# Patient Record
Sex: MALE | Race: WHITE | NOT HISPANIC OR LATINO | URBAN - METROPOLITAN AREA
[De-identification: names, ages, dates, MRNs, and addresses within clinical notes are randomized per-mention and may not be internally consistent; named-entity substitution may affect disease eponyms.]

---

## 2019-12-17 PROBLEM — Z00.00 ENCOUNTER FOR PREVENTIVE HEALTH EXAMINATION: Status: ACTIVE | Noted: 2019-12-17

## 2020-07-16 ENCOUNTER — EMERGENCY (EMERGENCY)
Facility: HOSPITAL | Age: 70
LOS: 1 days | Discharge: HOME | End: 2020-07-16
Payer: COMMERCIAL

## 2020-07-16 VITALS
WEIGHT: 160.06 LBS | DIASTOLIC BLOOD PRESSURE: 80 MMHG | HEART RATE: 68 BPM | OXYGEN SATURATION: 95 % | RESPIRATION RATE: 18 BRPM | TEMPERATURE: 98 F | SYSTOLIC BLOOD PRESSURE: 159 MMHG | HEIGHT: 70 IN

## 2020-07-16 LAB
BASOPHILS # BLD AUTO: 0.09 K/UL — SIGNIFICANT CHANGE UP (ref 0–0.2)
BASOPHILS NFR BLD AUTO: 1.8 % — SIGNIFICANT CHANGE UP (ref 0–2)
EOSINOPHIL # BLD AUTO: 0.22 K/UL — SIGNIFICANT CHANGE UP (ref 0–0.5)
EOSINOPHIL NFR BLD AUTO: 4.5 % — SIGNIFICANT CHANGE UP (ref 0–6)
HCT VFR BLD CALC: 45.6 % — SIGNIFICANT CHANGE UP (ref 39–50)
HGB BLD-MCNC: 15.9 G/DL — SIGNIFICANT CHANGE UP (ref 13–17)
IMM GRANULOCYTES NFR BLD AUTO: 0.2 % — SIGNIFICANT CHANGE UP (ref 0–1.5)
LYMPHOCYTES # BLD AUTO: 1.91 K/UL — SIGNIFICANT CHANGE UP (ref 1–3.3)
LYMPHOCYTES # BLD AUTO: 38.7 % — SIGNIFICANT CHANGE UP (ref 13–44)
MCHC RBC-ENTMCNC: 34.5 PG — HIGH (ref 27–34)
MCHC RBC-ENTMCNC: 34.9 GM/DL — SIGNIFICANT CHANGE UP (ref 32–36)
MCV RBC AUTO: 98.9 FL — SIGNIFICANT CHANGE UP (ref 80–100)
MONOCYTES # BLD AUTO: 0.59 K/UL — SIGNIFICANT CHANGE UP (ref 0–0.9)
MONOCYTES NFR BLD AUTO: 11.9 % — SIGNIFICANT CHANGE UP (ref 2–14)
NEUTROPHILS # BLD AUTO: 2.12 K/UL — SIGNIFICANT CHANGE UP (ref 1.8–7.4)
NEUTROPHILS NFR BLD AUTO: 42.9 % — LOW (ref 43–77)
NRBC # BLD: 0 /100 WBCS — SIGNIFICANT CHANGE UP (ref 0–0)
PCP SPEC-MCNC: SIGNIFICANT CHANGE UP
PLATELET # BLD AUTO: 147 K/UL — LOW (ref 150–400)
RBC # BLD: 4.61 M/UL — SIGNIFICANT CHANGE UP (ref 4.2–5.8)
RBC # FLD: 14 % — SIGNIFICANT CHANGE UP (ref 10.3–14.5)
WBC # BLD: 4.94 K/UL — SIGNIFICANT CHANGE UP (ref 3.8–10.5)
WBC # FLD AUTO: 4.94 K/UL — SIGNIFICANT CHANGE UP (ref 3.8–10.5)

## 2020-07-16 PROCEDURE — 93010 ELECTROCARDIOGRAM REPORT: CPT | Mod: NC

## 2020-07-16 PROCEDURE — 90792 PSYCH DIAG EVAL W/MED SRVCS: CPT | Mod: GC

## 2020-07-16 PROCEDURE — 99285 EMERGENCY DEPT VISIT HI MDM: CPT

## 2020-07-16 PROCEDURE — 70450 CT HEAD/BRAIN W/O DYE: CPT | Mod: 26

## 2020-07-16 RX ORDER — SODIUM CHLORIDE 9 MG/ML
1000 INJECTION INTRAMUSCULAR; INTRAVENOUS; SUBCUTANEOUS ONCE
Refills: 0 | Status: COMPLETED | OUTPATIENT
Start: 2020-07-16 | End: 2020-07-16

## 2020-07-16 RX ADMIN — SODIUM CHLORIDE 1000 MILLILITER(S): 9 INJECTION INTRAMUSCULAR; INTRAVENOUS; SUBCUTANEOUS at 23:28

## 2020-07-16 NOTE — ED ADULT NURSE NOTE - NSIMPLEMENTINTERV_GEN_ALL_ED
Implemented All Fall Risk Interventions:  [place] to call system. Call bell, personal items and telephone within reach. Instruct patient to call for assistance. Room bathroom lighting operational. Non-slip footwear when patient is off stretcher. Physically safe environment: no spills, clutter or unnecessary equipment. Stretcher in lowest position, wheels locked, appropriate side rails in place. Provide visual cue, wrist band, yellow gown, etc. Monitor gait and stability. Monitor for mental status changes and reorient to person, place, and time. Review medications for side effects contributing to fall risk. Reinforce activity limits and safety measures with patient and family.

## 2020-07-16 NOTE — ED PROVIDER NOTE - NS ED ROS FT
GENERAL: no fever, chills, fatigue, weight loss, night sweats  HEENT: no eye pain, discharge, conjunctivitis, ear pain, hearing loss, rhinorrhea, congestion, throat pain  CARDIAC: no chest pain, palpitations, lightheadedness, syncope  PULM: no dyspnea, wheezing  GI: no abdominal pain, nausea, vomiting, diarrhea, constipation, melena, hematochezia  : no urinary dysuria, frequency, incontinence, hematuria  NEURO: no headache, changes in vision, motor weakness, sensory changes  MSK: no joint pain, joint swelling, myalgias  SKIN: no rashes  HEME: no active bleeding, excessive bruising

## 2020-07-16 NOTE — ED PROVIDER NOTE - NSFOLLOWUPINSTRUCTIONS_ED_ALL_ED_FT
You were seen in the ER for alcohol intoxication and depressed mood. We obtained labwork and CT scan of your head which showed no significant abnormalities or brain injury. We observed you until you were sober. Initially you had thoughts of not wanting to live anymore, but once you were sober you no longer felt this way. We had our psychiatry team evaluate you who recommended following up with AA/rehab and outpatient psychiatry for further evaluation and management of your depressed mood (referral provided). You will be discharged home but should return to the ER immediately for any severe or worsening depressed mood, desire to not live anymore or plan to hurt yourself or others, shaking, hallucinations, seizures, agitation, chest pain, difficulty breathing, vomiting/inability to eat, or any other new or concerning symptoms.

## 2020-07-16 NOTE — ED PROVIDER NOTE - CLINICAL SUMMARY MEDICAL DECISION MAKING FREE TEXT BOX
70M no PMHx BIBEMS from home presenting with presumed alcohol intoxication. On PE, no signs of trauma, VS wnl, appears intoxicated. Will check FS, hold off on labs, allow metabolism of offending agent, call wife for collateral. 70M no PMHx BIBEMS from home presenting with presumed alcohol intoxication. On PE, no signs of trauma, VS wnl, appears intoxicated. Will check labs, CTH to eval for SDH in setting of multiple falls, allow metabolism of offending agent, call wife for collateral, reassess need to psych consult when clinically sober, dispo pending work up.

## 2020-07-16 NOTE — ED ADULT NURSE NOTE - OBJECTIVE STATEMENT
70yr old male w/ no pmhx BIBEMS c/o alcohol intox. Per EMS pts was drinking a lot of  techquilla and per pts wife, pt was acting irrationally and could hardly walk so she called EMS. Pt has no complaints at this time and states he feels fine. Pt is A&Ox3, pleasant, and cooperative. FS obtained upon arrival is 95. Pt denies CP, SOB, NVD, ETOH Withdrawal, GI,  symptoms.  pt assessed by MD, bed lowered and locked, call bell within reach. will reassess 70yr old male w/ no pmhx BIBEMS c/o alcohol intox. Per EMS pts was drinking a lot of  techquilla and per pts wife, pt was acting irrationally and could hardly walk so she called EMS. Pt has no complaints at this time and states he feels fine. Pt is A&Ox3, pleasant, and cooperative. FS obtained upon arrival is 95. MD s/w pts wife who states pt has been excessively drinking everyday for the past month. Wife states pts son is currently really sick in [facility] and she associates sons illness to pts drinking. Wife also states pt has had a lot of falls d/t his drinking and states pt has expressed thoughts of ending his life. Pt denies SI/HI, CP, SOB, NVD, ETOH Withdrawal, GI,  symptoms. no fall, head injury or trauma.   pt assessed by MD, bed lowered and locked, call bell within reach. will reassess

## 2020-07-16 NOTE — ED PROVIDER NOTE - PHYSICAL EXAMINATION
GENERAL: non-toxic appearing, in NAD  HEAD: atraumatic, normocephalic  EYES: vision grossly intact, no conjunctivitis or discharge  EARS: hearing grossly intact  NOSE: no nasal discharge, epistaxis   CARDIAC: RRR, normal S1S2,  no appreciable murmurs, no cyanosis, cap refill < 2 seconds  PULM: no respiratory distress, oxygen saturation on RA wnl, CTAB, no crackles, rales, rhonchi, or wheezing  GI: abdomen nondistended, soft, nontender, no guarding or rebound tenderness, no palpable masses  NEURO: awake and alert, follow commands, slurred speech, PERRLA, EOMI, no focal motor or sensory deficits  MSK: spine appears normal, no joint swelling or erythema, no gross deformities of extremities  EXT: no peripheral edema, calf tenderness, redness or swelling  SKIN: warm, dry, and intact, no rashes  PSYCH: appropriate mood and affect

## 2020-07-16 NOTE — ED ADULT NURSE NOTE - CAS EDN DISCHARGE ASSESSMENT
Awake/Patient baseline mental status/Alert and oriented to person, place and time/Dressing clean and dry/Symptoms improved

## 2020-07-16 NOTE — ED PROVIDER NOTE - PATIENT PORTAL LINK FT
You can access the FollowMyHealth Patient Portal offered by [facility] by registering at the following website: [email or web address]. By joining [facility]’s FollowMyHealth portal, you will also be able to view your health information using other applications (apps) compatible with our system.

## 2020-07-16 NOTE — ED PROVIDER NOTE - NSFOLLOWUPCLINICS_GEN_ALL_ED_FT
[facility] Psych Dept of Substance Abuse  Psychiatry Substance Abuse  [address] [place] [address]  Phone: [phone]  Fax:   Follow Up Time: 1-3 Days    [facility] Psychiatry  Psychiatry  [address] [place] [address]  Phone: [phone]  Fax:   Follow Up Time:

## 2020-07-16 NOTE — ED PROVIDER NOTE - PROGRESS NOTE DETAILS
Wife:  Drinking every single day.  Son hospitalized at [facility] with cancer.  He has never made her feel unsafe, "She just needed a break." Sign out follow-up: No acute overnight events. Pt calm. Ambulatory. Awaiting psychiatric evaluation when sober (8AM by estimated metabolism) for suicidal ideation based on statements made to spouse. MARIA G. Abelardo EM/IM PGY3: Pt evaluated and cleared by psych, recommending outpatient substance abuse and psych f/u. Patient clinically sober, ambulating without difficulty, tolerating PO intake, speaking calm and cooperatively, no longer with SI, states he feels well and would like to be discharged home. Spoke with patient's wife and updated on these results and plan which she verbalized understanding of and agrees with, feels safe with him coming home.

## 2020-07-16 NOTE — ED PROVIDER NOTE - ATTENDING CONTRIBUTION TO CARE
MD Mccracken:  patient seen and evaluated with the resident.  I was present for key portions of the History & Physical, and I agree with the Impression & Plan.  MD Mccracken:  71 yo M, bib EMS after wife called EMS on account of verbal altercation.   Onset: 1hr PTA.  Location of event: at private residence, in [place].  Patient endorses +++ ETOH tonight, tequila.  Cannot say exactly how much he drank or when was his last drink.  VS: wnl.  Physical Exam:  elderly M, smells of ETOH, pleasant, smiling, NCAT, PERRL, EOMI, neck supple, RRR, CTA B, Abd: s/nd/nt, Ext: no edema, Neuro: AAOx3, gait not assessed.  Impression:  ETOH intox w/o any evidence of injury or clinical finding more concerning for other underlying pathology.  I do not feel that ETOH level, labs, or advanced imaging are indicated, as they will not impact patient care at this time.    Plan:  call wife, verify collateral, metabolize, clinically clear.

## 2020-07-16 NOTE — ED PROVIDER NOTE - OBJECTIVE STATEMENT
70M no PMHx BIBEMS from home presenting with presumed alcohol intoxication. Patient says he had a few drinks of Tequila tonight. Denies fall, head trauma, LOC. No chest pain, abdominal pain, fever, URI symptoms, n/v/d. Just states he "feels shitty". Called wife for collateral. She says he drinks daily. Son is in [facility] 70M no PMHx BIBEMS from home presenting with presumed alcohol intoxication. Patient says he had a few drinks of Tequila tonight. Denies fall, head trauma, LOC. No chest pain, abdominal pain, fever, URI symptoms, n/v/d. Just states he "feels shitty". Called wife for collateral. She says he drinks daily over the past month, which is new behavior for him. Son is in [facility] and is very sick and she believes this is the reason he is drinking. She says that "he is a mess" and believes he may be depressed. She states that patient has said "I don't want to live anymore". Denies any actual suicidal attempts. She says that he does not threaten her or hurt her, but does verbally abuse her. States that he falls almost every day, including today. Patient denies SI/HI, hallucinations at this time.

## 2020-07-17 VITALS
SYSTOLIC BLOOD PRESSURE: 157 MMHG | DIASTOLIC BLOOD PRESSURE: 80 MMHG | RESPIRATION RATE: 16 BRPM | HEART RATE: 72 BPM | OXYGEN SATURATION: 96 % | TEMPERATURE: 98 F

## 2020-07-17 DIAGNOSIS — F43.21 ADJUSTMENT DISORDER WITH DEPRESSED MOOD: ICD-10-CM

## 2020-07-17 LAB
ALBUMIN SERPL ELPH-MCNC: 4.3 G/DL — SIGNIFICANT CHANGE UP (ref 3.3–5)
ALP SERPL-CCNC: 60 U/L — SIGNIFICANT CHANGE UP (ref 40–120)
ALT FLD-CCNC: 18 U/L — SIGNIFICANT CHANGE UP (ref 10–45)
ANION GAP SERPL CALC-SCNC: 15 MMOL/L — SIGNIFICANT CHANGE UP (ref 5–17)
APAP SERPL-MCNC: <15 UG/ML — SIGNIFICANT CHANGE UP (ref 10–30)
APPEARANCE UR: CLEAR — SIGNIFICANT CHANGE UP
APTT BLD: 28.6 SEC — SIGNIFICANT CHANGE UP (ref 27.5–35.5)
AST SERPL-CCNC: 17 U/L — SIGNIFICANT CHANGE UP (ref 10–40)
BACTERIA # UR AUTO: 0 — SIGNIFICANT CHANGE UP
BILIRUB SERPL-MCNC: 0.4 MG/DL — SIGNIFICANT CHANGE UP (ref 0.2–1.2)
BILIRUB UR-MCNC: NEGATIVE — SIGNIFICANT CHANGE UP
BUN SERPL-MCNC: 17 MG/DL — SIGNIFICANT CHANGE UP (ref 7–23)
CALCIUM SERPL-MCNC: 8.9 MG/DL — SIGNIFICANT CHANGE UP (ref 8.4–10.5)
CHLORIDE SERPL-SCNC: 108 MMOL/L — SIGNIFICANT CHANGE UP (ref 96–108)
CO2 SERPL-SCNC: 21 MMOL/L — LOW (ref 22–31)
COLOR SPEC: COLORLESS — SIGNIFICANT CHANGE UP
CREAT SERPL-MCNC: 0.71 MG/DL — SIGNIFICANT CHANGE UP (ref 0.5–1.3)
DIFF PNL FLD: NEGATIVE — SIGNIFICANT CHANGE UP
EPI CELLS # UR: 0 /HPF — SIGNIFICANT CHANGE UP
ETHANOL SERPL-MCNC: 337 MG/DL — HIGH (ref 0–10)
GLUCOSE SERPL-MCNC: 103 MG/DL — HIGH (ref 70–99)
GLUCOSE UR QL: NEGATIVE — SIGNIFICANT CHANGE UP
INR BLD: 0.87 RATIO — LOW (ref 0.88–1.16)
KETONES UR-MCNC: NEGATIVE — SIGNIFICANT CHANGE UP
LEUKOCYTE ESTERASE UR-ACNC: NEGATIVE — SIGNIFICANT CHANGE UP
LIDOCAIN IGE QN: 43 U/L — SIGNIFICANT CHANGE UP (ref 7–60)
MAGNESIUM SERPL-MCNC: 2.2 MG/DL — SIGNIFICANT CHANGE UP (ref 1.6–2.6)
NITRITE UR-MCNC: NEGATIVE — SIGNIFICANT CHANGE UP
PH UR: 6 — SIGNIFICANT CHANGE UP (ref 5–8)
PHOSPHATE SERPL-MCNC: 3.2 MG/DL — SIGNIFICANT CHANGE UP (ref 2.5–4.5)
POTASSIUM SERPL-MCNC: 4.2 MMOL/L — SIGNIFICANT CHANGE UP (ref 3.5–5.3)
POTASSIUM SERPL-SCNC: 4.2 MMOL/L — SIGNIFICANT CHANGE UP (ref 3.5–5.3)
PROT SERPL-MCNC: 7.1 G/DL — SIGNIFICANT CHANGE UP (ref 6–8.3)
PROT UR-MCNC: NEGATIVE — SIGNIFICANT CHANGE UP
PROTHROM AB SERPL-ACNC: 10.4 SEC — LOW (ref 10.6–13.6)
RBC CASTS # UR COMP ASSIST: 0 /HPF — SIGNIFICANT CHANGE UP (ref 0–4)
SALICYLATES SERPL-MCNC: <2 MG/DL — LOW (ref 15–30)
SODIUM SERPL-SCNC: 144 MMOL/L — SIGNIFICANT CHANGE UP (ref 135–145)
SP GR SPEC: 1.01 — LOW (ref 1.01–1.02)
UROBILINOGEN FLD QL: NEGATIVE — SIGNIFICANT CHANGE UP
WBC UR QL: 0 /HPF — SIGNIFICANT CHANGE UP (ref 0–5)

## 2020-07-17 PROCEDURE — 93005 ELECTROCARDIOGRAM TRACING: CPT

## 2020-07-17 PROCEDURE — 85610 PROTHROMBIN TIME: CPT

## 2020-07-17 PROCEDURE — 71045 X-RAY EXAM CHEST 1 VIEW: CPT

## 2020-07-17 PROCEDURE — 70450 CT HEAD/BRAIN W/O DYE: CPT

## 2020-07-17 PROCEDURE — 80307 DRUG TEST PRSMV CHEM ANLYZR: CPT

## 2020-07-17 PROCEDURE — 71045 X-RAY EXAM CHEST 1 VIEW: CPT | Mod: 26

## 2020-07-17 PROCEDURE — 80053 COMPREHEN METABOLIC PANEL: CPT

## 2020-07-17 PROCEDURE — 82962 GLUCOSE BLOOD TEST: CPT

## 2020-07-17 PROCEDURE — 84100 ASSAY OF PHOSPHORUS: CPT

## 2020-07-17 PROCEDURE — 85027 COMPLETE CBC AUTOMATED: CPT

## 2020-07-17 PROCEDURE — 83690 ASSAY OF LIPASE: CPT

## 2020-07-17 PROCEDURE — 85730 THROMBOPLASTIN TIME PARTIAL: CPT

## 2020-07-17 PROCEDURE — 99285 EMERGENCY DEPT VISIT HI MDM: CPT | Mod: 25

## 2020-07-17 PROCEDURE — 83735 ASSAY OF MAGNESIUM: CPT

## 2020-07-17 PROCEDURE — 81001 URINALYSIS AUTO W/SCOPE: CPT

## 2020-07-17 RX ORDER — THIAMINE MONONITRATE (VIT B1) 100 MG
100 TABLET ORAL ONCE
Refills: 0 | Status: COMPLETED | OUTPATIENT
Start: 2020-07-17 | End: 2020-07-17

## 2020-07-17 RX ORDER — FOLIC ACID 0.8 MG
1 TABLET ORAL ONCE
Refills: 0 | Status: COMPLETED | OUTPATIENT
Start: 2020-07-17 | End: 2020-07-17

## 2020-07-17 RX ADMIN — Medication 1 TABLET(S): at 03:40

## 2020-07-17 RX ADMIN — Medication 25 MILLIGRAM(S): at 03:41

## 2020-07-17 RX ADMIN — Medication 100 MILLIGRAM(S): at 03:40

## 2020-07-17 RX ADMIN — Medication 1 MILLIGRAM(S): at 03:40

## 2020-07-17 NOTE — ED ADULT NURSE REASSESSMENT NOTE - NS ED NURSE REASSESS COMMENT FT1
report received from RDOY Velasco. patient resting comfortably and wakes easily. as many hazards cleared from room as possible. patient awaiting psych consult. 1:1 maintained for patient safety. patient awaiting psych consult. will continue to monitor.

## 2020-07-17 NOTE — ED ADULT NURSE REASSESSMENT NOTE - NS ED NURSE REASSESS COMMENT FT1
pt sleeping in bed, VSS, and, pt arousable upon stimulation and in NAD. pt has no complaints at this time. will reassess. 1:1 still in place, safety maintained.

## 2020-07-17 NOTE — CHART NOTE - NSCHARTNOTEFT_GEN_A_CORE
EMERGENCY : LMSW consulted by psychiatry team stating that patient is cleared for discharge and in need of further community resources for behavorial health and ETOH use. As per MD patient is also medically cleared for discharge. LMSW reviewed patient's chart. As per chart review patient is a 70yr old male w/ no pmhx BIBEMS c/o alcohol intox. Per EMS pts was drinking a lot of tequila and per pts wife, pt was acting irrationally and could hardly walk so she called EMS. Wife also states to MD that pt has had a lot of falls d/t his drinking and states pt has expressed thoughts of ending his life. For this reason, patient was referred to psych this morning once clinically sober. Pt cleared for DC by psych and med teams. LMSW met with patient at bedside and introduced self to which patient verbalized understanding. Patient is alert and oriented x4 at this time. Patient confirms all demographic information. SBIRT completed (see SBIRT note). Patient endorses ETOH use but no drug use. LMSW provided education and counseling to patient on harmful ETOH use to which patient was receptive. Patient states he has seen a psychiatrist in the past, Dr. Lucia located at [address] [place] [address] PH:[phone], but that it has been years since he has seen him. Patient states should he feel he needs to seek help again that he would go back to Dr. Lucia. LMSW also provided education on additional resources like SBIRT community resources, [facility] crisis center and inpatient SBIRT resources. Patient receptive to crisis clinic and community outpatient resources and accepts these resources but declines inpatient resources. Patient with no further questions or concerns at this time. Patient states he will call a taxi to go home. LMSW provided patient with contact information and ensured ongoing social work availability. Social work remains available for any needs.

## 2020-07-17 NOTE — BEHAVIORAL HEALTH ASSESSMENT NOTE - SUMMARY
pt is a 69 y/o male no PMHx , denies previous psychiatric hx. domiciled with wife in private house. Pt was BIBEMS from home presenting with alcohol intoxication. consult was place for possible suicidal ideations.     pt was calm cooperative, denies current depression. pt relates stressors however relates coping stratagies

## 2020-07-17 NOTE — BEHAVIORAL HEALTH ASSESSMENT NOTE - NSBHCHARTREVIEWINVESTIGATE_PSY_A_CORE FT
Ventricular Rate 67 BPM    Atrial Rate 67 BPM    P-R Interval 170 ms    QRS Duration 104 ms     ms    QTc 395 ms    P Axis 39 degrees    R Axis 38 degrees    T Axis 24 degrees    Diagnosis Line SINUS RHYTHM WITH PREMATURE ATRIAL COMPLEXES  OTHERWISE NORMAL ECG

## 2020-07-17 NOTE — BEHAVIORAL HEALTH ASSESSMENT NOTE - SUICIDE PROTECTIVE FACTORS
Ability to cope with stress/Has future plans/Identifies reasons for living/Supportive social network of family or friends/Responsibility to family and others

## 2020-07-17 NOTE — BEHAVIORAL HEALTH ASSESSMENT NOTE - NSBHCHARTREVIEWLAB_PSY_A_CORE FT
CBC Full  -  ( 2020 23:40 )  WBC Count : 4.94 K/uL  RBC Count : 4.61 M/uL  Hemoglobin : 15.9 g/dL  Hematocrit : 45.6 %  Platelet Count - Automated : 147 K/uL  Mean Cell Volume : 98.9 fl  Mean Cell Hemoglobin : 34.5 pg  Mean Cell Hemoglobin Concentration : 34.9 gm/dL  Auto Neutrophil # : 2.12 K/uL  Auto Lymphocyte # : 1.91 K/uL  Auto Monocyte # : 0.59 K/uL  Auto Eosinophil # : 0.22 K/uL  Auto Basophil # : 0.09 K/uL  Auto Neutrophil % : 42.9 %  Auto Lymphocyte % : 38.7 %  Auto Monocyte % : 11.9 %  Auto Eosinophil % : 4.5 %  Auto Basophil % : 1.8 %    -    144  |  108  |  17  ----------------------------<  103<H>  4.2   |  21<L>  |  0.71    Ca    8.9      2020 23:40  Phos  3.2     07-16  Mg     2.2     07-16    TPro  7.1  /  Alb  4.3  /  TBili  0.4  /  DBili  x   /  AST  17  /  ALT  18  /  AlkPhos  60  07-16    LIVER FUNCTIONS - ( 2020 23:40 )  Alb: 4.3 g/dL / Pro: 7.1 g/dL / ALK PHOS: 60 U/L / ALT: 18 U/L / AST: 17 U/L / GGT: x           Urinalysis Basic - ( 2020 23:47 )    Color: Colorless / Appearance: Clear / S.008 / pH: x  Gluc: x / Ketone: Negative  / Bili: Negative / Urobili: Negative   Blood: x / Protein: Negative / Nitrite: Negative   Leuk Esterase: Negative / RBC: 0 /hpf / WBC 0 /HPF   Sq Epi: x / Non Sq Epi: 0 /hpf / Bacteria: 0.0

## 2020-07-17 NOTE — BEHAVIORAL HEALTH ASSESSMENT NOTE - NSBHCONSULTRECOMMENDOTHER_PSY_A_CORE FT
referral to rehab, AA and or outpatient substance use treatement for alcohol use  pt to follow up with outpatient psychiatrist/therapist  provide [facility] resource

## 2020-07-17 NOTE — BEHAVIORAL HEALTH ASSESSMENT NOTE - CASE SUMMARY
This is a 70-y.o. CM patient no PMHx , denies previous psychiatric hx. domiciled with wife in private house. Pt was BIBEMS from home presenting with alcohol intoxication. consult was place for possible suicidal ideations.    I have seen and evaluated this patient myself. Chart, labs, meds reviewed. I agree with fellow's assessment and plan.

## 2020-07-17 NOTE — BEHAVIORAL HEALTH ASSESSMENT NOTE - NSBHCHARTREVIEWVS_PSY_A_CORE FT
Vital Signs Last 24 Hrs  T(C): 36.9 (17 Jul 2020 10:54), Max: 36.9 (16 Jul 2020 22:53)  T(F): 98.4 (17 Jul 2020 10:54), Max: 98.4 (16 Jul 2020 22:53)  HR: 72 (17 Jul 2020 10:54) (63 - 75)  BP: 157/80 (17 Jul 2020 10:54) (131/62 - 159/80)  BP(mean): --  RR: 16 (17 Jul 2020 10:54) (16 - 20)  SpO2: 96% (17 Jul 2020 10:54) (95% - 98%)

## 2020-07-17 NOTE — ED ADULT NURSE REASSESSMENT NOTE - NS ED NURSE REASSESS COMMENT FT1
pt A&Ox3 resting comfortably in bed, arousable upon stimuli. pt non combative, and pleasant with no complaints at this time.

## 2020-07-17 NOTE — SBIRT NOTE ADULT - NSSBIRTALCPASSREFTXDET_GEN_A_CORE
LMSW provided education and counseling on harmful ETOH use. LMSW also provided patient with multiple SBIRT community resources as well as the [facility] crisis center pamphlet and provided education on resources. Patient receptive to resources but declines assistance by LMSW to schedule intake appointment. LMSW also offered patient inpatient resources to which he declined. Contact information provided. Ongoing social work availability ensured.

## 2020-07-17 NOTE — BEHAVIORAL HEALTH ASSESSMENT NOTE - HPI (INCLUDE ILLNESS QUALITY, SEVERITY, DURATION, TIMING, CONTEXT, MODIFYING FACTORS, ASSOCIATED SIGNS AND SYMPTOMS)
pt is a 71 y/o male no PMHx , denies previous psychiatric hx. domiciled with wife in private house. Pt was BIBEMS from home presenting with alcohol intoxication. consult was place for possible suicidal ideations.     Pt was seen and evaluated with Dr. Flores, 1:1 bedside    Pt was calm, cooperative, pleasant, making jokes with writer appropriately, expresses full range of emotions. pt has linear thought process. He relates fair mood today. He relates had recent stressors in his life and has been drinking daily for the last few months. He relates his son has back cancer is having surgery today. He relates remaining positive about his prognosis and relates if there is a complication he would handle it fairly "get things done". pt however remains optimistic about his outcome. He relates "small arguments" which his wife in addition. He relates being bored at home since he has not work however pt relates he is financially stable and does not need to work. He relates drinking 2-3 drinks. Pt relates "I drink because I'm bored" He denied drinking to cope with stress.  He relates increased appetite due to coping with stress. He relates getting poor sleep at time with difficulty with sleep maintenance . He relates he will get 8 hours of sleep total. Fair energy. He denies hopelessness, helplessness or worthlessness. Pt denies remembering making suicidal statement while intoxicated Patient denies current suicidal or homicidal ideations intent or plan . "I want to life, Its time for me to enjoy my life,..." pt denies current or past symptoms of A/V hallucinations, pt denies paranoia. Pt is goal oriented,. He discussed various hobbies such as photography, traveling, he relates working systematically through stressors. pt believes he jcarlos with stress fairly. Pt relates he would decrease his alcohol intake. Writer use Motivational interviewing on a scale of 1-10 , (10 meaning absence and 0)pt relates he is at a 9 in regards to quitting. "I can quit when I want too" He relates motivation to follow up with his psychiatrist. denies current treatment. pt reports he has Xanax 1mg PRN for anxiety , he adamantly and without prompting relates he does not mix this medication with alcohol, relates understanding risk of alcohol and benzo use and again denies concurrent use.    Collateral attempted from pt's wife ()- generic message pt is a 69 y/o male no PMHx , denies previous psychiatric hx. domiciled with wife in private house. Pt was BIBEMS from home presenting with alcohol intoxication. consult was place for possible suicidal ideations.     Pt was seen and evaluated with Dr. Flores, 1:1 bedside    Pt was calm, cooperative, pleasant, making jokes with writer appropriately, expresses full range of emotions. pt has linear thought process. He relates fair mood today. He relates had recent stressors in his life and has been drinking daily for the last few months. He relates his son has back cancer is having surgery today. He relates remaining positive about his prognosis and relates if there is a complication he would handle it fairly "get things done". pt however remains optimistic about his outcome. He relates "small arguments" which his wife in addition. He relates being bored at home since he has not work however pt relates he is financially stable and does not need to work. He relates drinking 2-3 drinks. Pt relates "I drink because I'm bored" He denied drinking to cope with stress.  He relates increased appetite due to coping with stress. He relates getting poor sleep at time with difficulty with sleep maintenance . He relates he will get 8 hours of sleep total. Fair energy. He denies hopelessness, helplessness or worthlessness. Pt denies remembering making suicidal statement while intoxicated Patient denies current suicidal or homicidal ideations intent or plan . "I want to life, Its time for me to enjoy my life,..." pt denies current or past symptoms of A/V hallucinations, pt denies paranoia. Pt is goal oriented,. He discussed various hobbies such as photography, traveling, he relates working systematically through stressors. pt believes he jcarlos with stress fairly. Pt relates he would decrease his alcohol intake. Writer use Motivational interviewing on a scale of 1-10 , (10 meaning absence and 0)pt relates he is at a 9 in regards to quitting. "I can quit when I want too" He relates motivation to follow up with his psychiatrist. denies current treatment. pt reports he has Xanax 1mg PRN for anxiety , he adamantly and without prompting relates he does not mix this medication with alcohol, relates understanding risk of alcohol and benzo use and again denies concurrent use.    Collateral attempted from pt's wife ()- generic message left initially, She called back service.  She relates pt has been "a little depressed" recently. She corroborates with above stressor. He has been drinking a lot recently for last few months. pt would fall after drinking some times and feels he should cut down. She denies signs of psychosis and red flag signs regarding depression such any suicide note and denies pt giving away belongings. He made a passive si statement yesterday while intoxicated but has never acted on this. Pt does not voice suicidal ideation when sober. He had some difficulty adjusting to Covid restrictions and his son occasionally cries about this.  She relates He dreamt of moving out of [place] which is on hold due to covid. She relates feeling comfortable with patient returning home.

## 2020-07-19 LAB
AMPHET UR-MCNC: NEGATIVE — SIGNIFICANT CHANGE UP
BARBITURATES, URINE.: NEGATIVE — SIGNIFICANT CHANGE UP
BENZODIAZ UR-MCNC: NEGATIVE — SIGNIFICANT CHANGE UP
COCAINE METAB.OTHER UR-MCNC: NEGATIVE — SIGNIFICANT CHANGE UP
CREATININE, URINE THERAPEUTIC: 26.3 MG/DL — SIGNIFICANT CHANGE UP
METHADONE UR-MCNC: NEGATIVE — SIGNIFICANT CHANGE UP
METHAQUALONE UR QL: NEGATIVE — SIGNIFICANT CHANGE UP
METHAQUALONE UR-MCNC: NEGATIVE — SIGNIFICANT CHANGE UP
OPIATES UR-MCNC: NEGATIVE — SIGNIFICANT CHANGE UP
PCP UR-MCNC: NEGATIVE — SIGNIFICANT CHANGE UP
PROPOXYPH UR QL: NEGATIVE — SIGNIFICANT CHANGE UP
THC UR QL: NEGATIVE — SIGNIFICANT CHANGE UP

## 2020-07-30 ENCOUNTER — EMERGENCY (EMERGENCY)
Facility: HOSPITAL | Age: 70
LOS: 1 days | Discharge: HOME | End: 2020-07-30
Attending: EMERGENCY MEDICINE
Payer: COMMERCIAL

## 2020-07-30 VITALS — SYSTOLIC BLOOD PRESSURE: 186 MMHG | DIASTOLIC BLOOD PRESSURE: 89 MMHG

## 2020-07-30 PROCEDURE — 93010 ELECTROCARDIOGRAM REPORT: CPT | Mod: NC

## 2020-07-30 PROCEDURE — 99284 EMERGENCY DEPT VISIT MOD MDM: CPT

## 2020-07-31 VITALS
HEART RATE: 65 BPM | RESPIRATION RATE: 18 BRPM | SYSTOLIC BLOOD PRESSURE: 118 MMHG | OXYGEN SATURATION: 98 % | TEMPERATURE: 99 F | DIASTOLIC BLOOD PRESSURE: 60 MMHG

## 2020-07-31 PROCEDURE — 93005 ELECTROCARDIOGRAM TRACING: CPT

## 2020-07-31 PROCEDURE — 82962 GLUCOSE BLOOD TEST: CPT

## 2020-07-31 PROCEDURE — 99285 EMERGENCY DEPT VISIT HI MDM: CPT

## 2020-07-31 RX ADMIN — Medication 1 TABLET(S): at 01:23

## 2020-07-31 NOTE — ED PROVIDER NOTE - OBJECTIVE STATEMENT
69 yo M with no pmh presents with acute alcohol intoxication. Reports drinking vodka in the last few days as a result of his 41y son diagnosed with cancer recently. Denies SI or HI. Denies prior admissions for alcohol intoxication or chronic alcohol use. Unwilling to tell time of last drink. No recent falls or head trauma. No other substance ingested.

## 2020-07-31 NOTE — ED PROVIDER NOTE - NSFOLLOWUPINSTRUCTIONS_ED_ALL_ED_FT
You were seen in the ED for altered mental state.   The following labs/imaging were obtained: see attached (if applicable)  Continue home meds if applicable.   Return to the ED if you develop leg or arm weakness, slurred speech, chest pain, shortness of breath, or worsening or new concerning symptoms.  Follow up with your primary care in 2-3 days.  Discussed with pt results of work up, strict return precautions, and need for follow up.  Pt expressed understanding and agrees with plan.

## 2020-07-31 NOTE — ED PROVIDER NOTE - PHYSICAL EXAMINATION
GENERAL: No fever or chills  EYES: no change in vision  HEENT: no trouble swallowing or speaking  CARDIAC: no chest pain or palpiations   PULMONARY: no cough or SOB  GI: no abdominal pain, nausea, vomiting, diarrhea, or constipation   : No changes in urination  SKIN: no rashes  NEURO: no headache, numbness, or weakness. Unwilling to walk.   MSK: No joint pain

## 2020-07-31 NOTE — ED PROVIDER NOTE - ATTENDING CONTRIBUTION TO CARE
------------ATTENDING NOTE------------   pt brought to ED by EMS after wife called that pt was acting funny, pt describes drinking alcohol secretly all day and describes being drunk, he denies any additional drug/intoxicant use, no trauma, no thoughts to harm/kill self/others, he is sad about his son's cancer diagnosis but feels safe at home, he has seen SW in past but declining services now, otherwise having coherent conversation and agreeing to EKG / close reassessments w/ plan to have d/w wife to d/c home -->  - Brad La MD   ------------------------------------------------ ------------ATTENDING NOTE------------   pt brought to ED by EMS after wife called that pt was acting funny, pt describes drinking alcohol secretly all day and describes being drunk, he denies any additional drug/intoxicant use, no trauma, no thoughts to harm/kill self/others, he is sad about his son's cancer diagnosis but feels safe at home, he has seen SW in past but declining services now, otherwise having coherent conversation and agreeing to EKG / close reassessments w/ plan to have d/w wife to d/c home --> ED Sign Out 1AM, remaining stable, nml VS, pending close reassessments for safety and d/w wife for planned d/c home.  - Brad La MD   ------------------------------------------------

## 2020-07-31 NOTE — ED ADULT NURSE NOTE - OBJECTIVE STATEMENT
Pt 69 y/o male, AxOx3, presents to ED from home after wife called EMS due to pt "not acting right". Pt reports drinking alcohol today, unknown how much at this time. Pt placed on constant observation upon arrival due to altered mental status. Pt is well appearing, speaking full sentences without difficulty. Breathing spontaneous and unlabored with pulse ox >95% on room air. Safety and comfort measures initiated- bed placed in lowest position and side rails raised. Pt oriented to call bell system.

## 2020-07-31 NOTE — ED ADULT NURSE REASSESSMENT NOTE - NS ED NURSE REASSESS COMMENT FT1
Pt awoke for vital signs. Pt calm, well appearing reporting that he "wants to sleep". Maintained on 1:1 for safety. V/S stable. MD Moran made aware.

## 2020-07-31 NOTE — ED PROVIDER NOTE - CLINICAL SUMMARY MEDICAL DECISION MAKING FREE TEXT BOX
see MD Note see MD Note  PGY 1 69 yo M with no pmh presents with acute alcohol intoxication in the setting of his son being diagnosed with cancer recently. Vitals WNL. No concern for acute withdrawal or suicidal ideation. Will check glucose, ekg, and re-evaluate until clinically sober.

## 2020-07-31 NOTE — ED PROVIDER NOTE - PATIENT PORTAL LINK FT
You can access the FollowMyHealth Patient Portal offered by [facility] by registering at the following website: [email or web address]. By joining [facility]’s FollowMyHealth portal, you will also be able to view your health information using other applications (apps) compatible with our system.

## 2021-02-16 NOTE — BEHAVIORAL HEALTH ASSESSMENT NOTE - HOMICIDALITY / AGGRESSION (CURRENT/PAST)
Per Dr. Thomas, spoke with patient regarding 2/15/21 appointment. Patient thought this appointment was cancelled as she decided to use topical acne treatments.     Patient also states she thought her medications were going to be refilled to the [facility] in [place].     Routed to RN   None known in lifetime

## 2021-08-23 ENCOUNTER — INPATIENT (INPATIENT)
Facility: HOSPITAL | Age: 71
LOS: 8 days | Discharge: NURSING, REHAB OR LONG-TERM CARE | DRG: 270 | End: 2021-09-01
Attending: STUDENT IN AN ORGANIZED HEALTH CARE EDUCATION/TRAINING PROGRAM | Admitting: HOSPITALIST
Payer: COMMERCIAL

## 2021-08-23 VITALS
TEMPERATURE: 98 F | OXYGEN SATURATION: 98 % | HEART RATE: 95 BPM | WEIGHT: 169.98 LBS | HEIGHT: 70 IN | SYSTOLIC BLOOD PRESSURE: 139 MMHG | DIASTOLIC BLOOD PRESSURE: 88 MMHG | RESPIRATION RATE: 16 BRPM

## 2021-08-23 DIAGNOSIS — I82.409 ACUTE EMBOLISM AND THROMBOSIS OF UNSPECIFIED DEEP VEINS OF UNSPECIFIED LOWER EXTREMITY: ICD-10-CM

## 2021-08-23 LAB
ALBUMIN SERPL ELPH-MCNC: 4 G/DL — SIGNIFICANT CHANGE UP (ref 3.3–5)
ALP SERPL-CCNC: 65 U/L — SIGNIFICANT CHANGE UP (ref 40–120)
ALT FLD-CCNC: 19 U/L — SIGNIFICANT CHANGE UP (ref 10–45)
ANION GAP SERPL CALC-SCNC: 19 MMOL/L — HIGH (ref 5–17)
APTT BLD: 29.1 SEC — SIGNIFICANT CHANGE UP (ref 27.5–35.5)
AST SERPL-CCNC: 15 U/L — SIGNIFICANT CHANGE UP (ref 10–40)
BASOPHILS # BLD AUTO: 0.1 K/UL — SIGNIFICANT CHANGE UP (ref 0–0.2)
BASOPHILS NFR BLD AUTO: 0.9 % — SIGNIFICANT CHANGE UP (ref 0–2)
BILIRUB SERPL-MCNC: 1.5 MG/DL — HIGH (ref 0.2–1.2)
BUN SERPL-MCNC: 19 MG/DL — SIGNIFICANT CHANGE UP (ref 7–23)
CALCIUM SERPL-MCNC: 10.2 MG/DL — SIGNIFICANT CHANGE UP (ref 8.4–10.5)
CHLORIDE SERPL-SCNC: 93 MMOL/L — LOW (ref 96–108)
CO2 SERPL-SCNC: 19 MMOL/L — LOW (ref 22–31)
CREAT SERPL-MCNC: 1.04 MG/DL — SIGNIFICANT CHANGE UP (ref 0.5–1.3)
EOSINOPHIL # BLD AUTO: 0.09 K/UL — SIGNIFICANT CHANGE UP (ref 0–0.5)
EOSINOPHIL NFR BLD AUTO: 0.8 % — SIGNIFICANT CHANGE UP (ref 0–6)
ETHANOL SERPL-MCNC: SIGNIFICANT CHANGE UP MG/DL (ref 0–10)
GLUCOSE SERPL-MCNC: 139 MG/DL — HIGH (ref 70–99)
HCT VFR BLD CALC: 48.8 % — SIGNIFICANT CHANGE UP (ref 39–50)
HGB BLD-MCNC: 17 G/DL — SIGNIFICANT CHANGE UP (ref 13–17)
INR BLD: 1.28 RATIO — HIGH (ref 0.88–1.16)
LYMPHOCYTES # BLD AUTO: 0.47 K/UL — LOW (ref 1–3.3)
LYMPHOCYTES # BLD AUTO: 4.3 % — LOW (ref 13–44)
MANUAL SMEAR VERIFICATION: SIGNIFICANT CHANGE UP
MCHC RBC-ENTMCNC: 34.8 GM/DL — SIGNIFICANT CHANGE UP (ref 32–36)
MCHC RBC-ENTMCNC: 35 PG — HIGH (ref 27–34)
MCV RBC AUTO: 100.4 FL — HIGH (ref 80–100)
MONOCYTES # BLD AUTO: 1.21 K/UL — HIGH (ref 0–0.9)
MONOCYTES NFR BLD AUTO: 11.1 % — SIGNIFICANT CHANGE UP (ref 2–14)
NEUTROPHILS # BLD AUTO: 9.02 K/UL — HIGH (ref 1.8–7.4)
NEUTROPHILS NFR BLD AUTO: 82.9 % — HIGH (ref 43–77)
PLAT MORPH BLD: NORMAL — SIGNIFICANT CHANGE UP
PLATELET # BLD AUTO: 97 K/UL — LOW (ref 150–400)
POTASSIUM SERPL-MCNC: 5.1 MMOL/L — SIGNIFICANT CHANGE UP (ref 3.5–5.3)
POTASSIUM SERPL-SCNC: 5.1 MMOL/L — SIGNIFICANT CHANGE UP (ref 3.5–5.3)
PROT SERPL-MCNC: 7.4 G/DL — SIGNIFICANT CHANGE UP (ref 6–8.3)
PROTHROM AB SERPL-ACNC: 15.2 SEC — HIGH (ref 10.6–13.6)
RBC # BLD: 4.86 M/UL — SIGNIFICANT CHANGE UP (ref 4.2–5.8)
RBC # FLD: 12.3 % — SIGNIFICANT CHANGE UP (ref 10.3–14.5)
RBC BLD AUTO: NORMAL — SIGNIFICANT CHANGE UP
SARS-COV-2 RNA SPEC QL NAA+PROBE: SIGNIFICANT CHANGE UP
SODIUM SERPL-SCNC: 131 MMOL/L — LOW (ref 135–145)
WBC # BLD: 10.88 K/UL — HIGH (ref 3.8–10.5)
WBC # FLD AUTO: 10.88 K/UL — HIGH (ref 3.8–10.5)

## 2021-08-23 PROCEDURE — 70450 CT HEAD/BRAIN W/O DYE: CPT | Mod: 26,MA

## 2021-08-23 PROCEDURE — 71045 X-RAY EXAM CHEST 1 VIEW: CPT | Mod: 26

## 2021-08-23 PROCEDURE — 93010 ELECTROCARDIOGRAM REPORT: CPT | Mod: NC

## 2021-08-23 PROCEDURE — 93971 EXTREMITY STUDY: CPT | Mod: 26,LT

## 2021-08-23 PROCEDURE — 99285 EMERGENCY DEPT VISIT HI MDM: CPT

## 2021-08-23 RX ORDER — HEPARIN SODIUM 5000 [USP'U]/ML
6500 INJECTION INTRAVENOUS; SUBCUTANEOUS EVERY 6 HOURS
Refills: 0 | Status: DISCONTINUED | OUTPATIENT
Start: 2021-08-23 | End: 2021-08-24

## 2021-08-23 RX ORDER — THIAMINE MONONITRATE (VIT B1) 100 MG
100 TABLET ORAL ONCE
Refills: 0 | Status: COMPLETED | OUTPATIENT
Start: 2021-08-23 | End: 2021-08-23

## 2021-08-23 RX ORDER — THIAMINE MONONITRATE (VIT B1) 100 MG
100 TABLET ORAL DAILY
Refills: 0 | Status: DISCONTINUED | OUTPATIENT
Start: 2021-08-23 | End: 2021-08-24

## 2021-08-23 RX ORDER — FOLIC ACID 0.8 MG
1 TABLET ORAL DAILY
Refills: 0 | Status: DISCONTINUED | OUTPATIENT
Start: 2021-08-23 | End: 2021-09-01

## 2021-08-23 RX ORDER — HEPARIN SODIUM 5000 [USP'U]/ML
INJECTION INTRAVENOUS; SUBCUTANEOUS
Qty: 25000 | Refills: 0 | Status: DISCONTINUED | OUTPATIENT
Start: 2021-08-23 | End: 2021-08-24

## 2021-08-23 RX ORDER — HEPARIN SODIUM 5000 [USP'U]/ML
3000 INJECTION INTRAVENOUS; SUBCUTANEOUS EVERY 6 HOURS
Refills: 0 | Status: DISCONTINUED | OUTPATIENT
Start: 2021-08-23 | End: 2021-08-23

## 2021-08-23 RX ORDER — DIAZEPAM 5 MG
2 TABLET ORAL ONCE
Refills: 0 | Status: DISCONTINUED | OUTPATIENT
Start: 2021-08-23 | End: 2021-08-23

## 2021-08-23 RX ORDER — DIAZEPAM 5 MG
5 TABLET ORAL ONCE
Refills: 0 | Status: DISCONTINUED | OUTPATIENT
Start: 2021-08-23 | End: 2021-08-23

## 2021-08-23 RX ADMIN — HEPARIN SODIUM 1400 UNIT(S)/HR: 5000 INJECTION INTRAVENOUS; SUBCUTANEOUS at 20:21

## 2021-08-23 RX ADMIN — Medication 100 MILLIGRAM(S): at 16:29

## 2021-08-23 RX ADMIN — Medication 1 TABLET(S): at 16:30

## 2021-08-23 RX ADMIN — Medication 1 MILLIGRAM(S): at 16:29

## 2021-08-23 RX ADMIN — Medication 2 MILLIGRAM(S): at 16:30

## 2021-08-23 RX ADMIN — Medication 5 MILLIGRAM(S): at 18:55

## 2021-08-23 NOTE — ED PROVIDER NOTE - PROGRESS NOTE DETAILS
d/w pts son Brad [phone]:pt was recently discharged from [facility] 3 days ago for etOH withdrawal (Seizures, delirium). last drink yesterday, son was with him all day today. Denies prior psych history such as SI/HI or attempts. pt recently became alcoholic 2/2 family and work stressors - Grant Alfaro PA-C [facility] LE revealing for extensive DVT. Attending aware. Patient aware. will pursue head CT prior to AC (likely lovenox). discussed with pt and pts son. pt tba - Grant Alfaro PA-C

## 2021-08-23 NOTE — ED ADULT NURSE REASSESSMENT NOTE - NS ED NURSE REASSESS COMMENT FT1
night float paged at 1443 and RN confirmed no initial heparin bolus ordered. patient educated on risks and benefits of heparin. heparin infusion initiated as ordered

## 2021-08-23 NOTE — ED ADULT NURSE NOTE - OBJECTIVE STATEMENT
Pt is a 71 year old male c/o left lower extremity swelling x 2 days.  Pt denies any fall or trama to site.  Pt is alert and oriented x 3.  Pt denies any SOB or chest pain.  Pt respirations even and unlabored. Skin warm dry and intact.  Left lower extremity swollen and + pedal pulses.  Denies loss of sensation to extremity.

## 2021-08-23 NOTE — ED ADULT NURSE NOTE - NSIMPLEMENTINTERV_GEN_ALL_ED
Implemented All Fall with Harm Risk Interventions:  Sophia to call system. Call bell, personal items and telephone within reach. Instruct patient to call for assistance. Room bathroom lighting operational. Non-slip footwear when patient is off stretcher. Physically safe environment: no spills, clutter or unnecessary equipment. Stretcher in lowest position, wheels locked, appropriate side rails in place. Provide visual cue, wrist band, yellow gown, etc. Monitor gait and stability. Monitor for mental status changes and reorient to person, place, and time. Review medications for side effects contributing to fall risk. Reinforce activity limits and safety measures with patient and family. Provide visual clues: red socks.

## 2021-08-23 NOTE — ED PROVIDER NOTE - ATTENDING CONTRIBUTION TO CARE
Attending Statement (IVONE Mahoney MD):    HPI: 72 y/o M c/o swelling of the left leg for the past 2 days.    Denies fall/ trauma.  States he has poor short term history and denies medical problems but states he does take medications.  + pain in the left leg.  no loss of sensation.  Per family; h/o alcohol abuse/dependence, recent admission for alcohol withdrawal and discharge 2-3 days ago, suspected alcohol use since discharge.  PMD Dr. Saud Queen    Review of Systems:  -General: no fever or chills  -ENT: no congestion, no difficulty swallowing  -Pulmonary: no cough, no shortness of breath  -Cardiac: no chest pain, no palpitations  -Gastrointestinal: no abdominal pain, no nausea, no vomiting, and no diarrhea.  -Genitourinary: no blood or pain with urination  -Musculoskeletal: no back or neck pain; + LLE pain/swelling  -Skin: no rashes  -Endocrine: No h/o diabetes or thyroid disease  -Neurologic: No focal weakness or numbness    All else negative unless otherwise specified elsewhere in this note.    PSH/PMH as noted above    On Physical Exam:  General: awake/alert, in NAD; speaking in full sentences, clearly, however, unable to give specific information about his medical conditions, avoids answering questions about substance use and directs me to call his son for more information.  HEENT: PERRL, MMM  Neck: no neck tenderness, no nuchal rigidity  Cardiac: normal s1, s2; RRR; no MGR  Lungs: CTABL  Abdomen: soft nontender/nondistended  : no bladder tenderness or distension  Skin: intact, no rash  Extremities: -LLE: 2+pitting edema throughout LLE and has mild purple color, however warm to touch, dp/pt pulses palpable, cap refill 2-3 sec in toes, and patient reports sensation to touch in toes, and is able to move knee/ankle/toes on command.  Neuro: no facial asymmetry, A&Ox3, moving all extremities equally; no tremors, no rigidity/clonus    MDM:  concern for alcohol use/dependence, possible early withdrawal, start librium; concern for LLE swelling, etienne in setting of recent hospitalization, concern for DVT; no evidence of acute infectious etiology; LLE likely swollen and discolored from venous congestion, unlikely arterial given palpable pulses.  will obtain screening labs, place on [facility] for monitoring, start librium, and obtain VA duplex US of LLE.  suspect will need admission; not candidate for outpatient AC therapy given substance use, poor medical compliance suspected.

## 2021-08-23 NOTE — ED PROVIDER NOTE - OBJECTIVE STATEMENT
Attending note (Denzel): 70 y/o M c/o swelling of the left leg for the past 2 days.    Denies fall/ trauma.  States he has poor short term history and denies medical problems but states he does take medications.  + pain in the left leg.  no loss of sensation Attending note (Denzel): 72 y/o M c/o swelling of the left leg for the past 2 days.    Denies fall/ trauma.  States he has poor short term history and denies medical problems but states he does take medications.  + pain in the left leg.  no loss of sensation  PMD Dr. Saud Queen

## 2021-08-23 NOTE — ED PROVIDER NOTE - CARE PLAN
Principal Discharge DX:	DVT of lower limb, acute  Secondary Diagnosis:	Alcohol dependence with withdrawal   1

## 2021-08-24 DIAGNOSIS — E87.2 ACIDOSIS: ICD-10-CM

## 2021-08-24 DIAGNOSIS — I80.219 PHLEBITIS AND THROMBOPHLEBITIS OF UNSPECIFIED ILIAC VEIN: ICD-10-CM

## 2021-08-24 DIAGNOSIS — E80.6 OTHER DISORDERS OF BILIRUBIN METABOLISM: ICD-10-CM

## 2021-08-24 DIAGNOSIS — Z29.9 ENCOUNTER FOR PROPHYLACTIC MEASURES, UNSPECIFIED: ICD-10-CM

## 2021-08-24 DIAGNOSIS — F10.230 ALCOHOL DEPENDENCE WITH WITHDRAWAL, UNCOMPLICATED: ICD-10-CM

## 2021-08-24 DIAGNOSIS — I82.409 ACUTE EMBOLISM AND THROMBOSIS OF UNSPECIFIED DEEP VEINS OF UNSPECIFIED LOWER EXTREMITY: ICD-10-CM

## 2021-08-24 DIAGNOSIS — I26.99 OTHER PULMONARY EMBOLISM WITHOUT ACUTE COR PULMONALE: ICD-10-CM

## 2021-08-24 LAB
A1C WITH ESTIMATED AVERAGE GLUCOSE RESULT: 5.4 % — SIGNIFICANT CHANGE UP (ref 4–5.6)
ALBUMIN SERPL ELPH-MCNC: 3.6 G/DL — SIGNIFICANT CHANGE UP (ref 3.3–5)
ALP SERPL-CCNC: 57 U/L — SIGNIFICANT CHANGE UP (ref 40–120)
ALT FLD-CCNC: 14 U/L — SIGNIFICANT CHANGE UP (ref 10–45)
ANION GAP SERPL CALC-SCNC: 17 MMOL/L — SIGNIFICANT CHANGE UP (ref 5–17)
APTT BLD: 47.5 SEC — HIGH (ref 27.5–35.5)
APTT BLD: 57 SEC — HIGH (ref 27.5–35.5)
APTT BLD: 62.4 SEC — HIGH (ref 27.5–35.5)
APTT BLD: 74.1 SEC — HIGH (ref 27.5–35.5)
AST SERPL-CCNC: 13 U/L — SIGNIFICANT CHANGE UP (ref 10–40)
B-OH-BUTYR SERPL-SCNC: 1.9 MMOL/L — HIGH
BASE EXCESS BLDV CALC-SCNC: 1.4 MMOL/L — SIGNIFICANT CHANGE UP (ref -2–2)
BILIRUB SERPL-MCNC: 1.1 MG/DL — SIGNIFICANT CHANGE UP (ref 0.2–1.2)
BUN SERPL-MCNC: 18 MG/DL — SIGNIFICANT CHANGE UP (ref 7–23)
CA-I SERPL-SCNC: 1.22 MMOL/L — SIGNIFICANT CHANGE UP (ref 1.15–1.33)
CALCIUM SERPL-MCNC: 9.4 MG/DL — SIGNIFICANT CHANGE UP (ref 8.4–10.5)
CHLORIDE BLDV-SCNC: 96 MMOL/L — SIGNIFICANT CHANGE UP (ref 96–108)
CHLORIDE SERPL-SCNC: 95 MMOL/L — LOW (ref 96–108)
CHOLEST SERPL-MCNC: 201 MG/DL — HIGH
CO2 BLDV-SCNC: 26 MMOL/L — SIGNIFICANT CHANGE UP (ref 22–26)
CO2 SERPL-SCNC: 22 MMOL/L — SIGNIFICANT CHANGE UP (ref 22–31)
COVID-19 SPIKE DOMAIN AB INTERP: POSITIVE
COVID-19 SPIKE DOMAIN ANTIBODY RESULT: >250 U/ML — HIGH
CREAT SERPL-MCNC: 0.84 MG/DL — SIGNIFICANT CHANGE UP (ref 0.5–1.3)
ESTIMATED AVERAGE GLUCOSE: 108 MG/DL — SIGNIFICANT CHANGE UP (ref 68–114)
GAS PNL BLDV: 130 MMOL/L — LOW (ref 136–145)
GAS PNL BLDV: SIGNIFICANT CHANGE UP
GAS PNL BLDV: SIGNIFICANT CHANGE UP
GLUCOSE BLDV-MCNC: 128 MG/DL — HIGH (ref 70–99)
GLUCOSE SERPL-MCNC: 123 MG/DL — HIGH (ref 70–99)
HCO3 BLDV-SCNC: 25 MMOL/L — SIGNIFICANT CHANGE UP (ref 22–29)
HCT VFR BLD CALC: 44.9 % — SIGNIFICANT CHANGE UP (ref 39–50)
HCT VFR BLD CALC: 45.8 % — SIGNIFICANT CHANGE UP (ref 39–50)
HCT VFR BLDA CALC: 50 % — SIGNIFICANT CHANGE UP (ref 39–51)
HDLC SERPL-MCNC: 38 MG/DL — LOW
HGB BLD CALC-MCNC: 16.7 G/DL — SIGNIFICANT CHANGE UP (ref 12.6–17.4)
HGB BLD-MCNC: 15.8 G/DL — SIGNIFICANT CHANGE UP (ref 13–17)
HGB BLD-MCNC: 16 G/DL — SIGNIFICANT CHANGE UP (ref 13–17)
INR BLD: 1.25 RATIO — HIGH (ref 0.88–1.16)
LACTATE BLDV-MCNC: 1.5 MMOL/L — SIGNIFICANT CHANGE UP (ref 0.7–2)
LIPID PNL WITH DIRECT LDL SERPL: 146 MG/DL — HIGH
MCHC RBC-ENTMCNC: 34.6 PG — HIGH (ref 27–34)
MCHC RBC-ENTMCNC: 34.9 GM/DL — SIGNIFICANT CHANGE UP (ref 32–36)
MCHC RBC-ENTMCNC: 35 PG — HIGH (ref 27–34)
MCHC RBC-ENTMCNC: 35.2 GM/DL — SIGNIFICANT CHANGE UP (ref 32–36)
MCV RBC AUTO: 98.9 FL — SIGNIFICANT CHANGE UP (ref 80–100)
MCV RBC AUTO: 99.3 FL — SIGNIFICANT CHANGE UP (ref 80–100)
NON HDL CHOLESTEROL: 162 MG/DL — HIGH
NRBC # BLD: 0 /100 WBCS — SIGNIFICANT CHANGE UP (ref 0–0)
NRBC # BLD: 0 /100 WBCS — SIGNIFICANT CHANGE UP (ref 0–0)
PCO2 BLDV: 36 MMHG — LOW (ref 42–55)
PH BLDV: 7.45 — HIGH (ref 7.32–7.43)
PLATELET # BLD AUTO: 112 K/UL — LOW (ref 150–400)
PLATELET # BLD AUTO: 122 K/UL — LOW (ref 150–400)
PO2 BLDV: 56 MMHG — HIGH (ref 25–45)
POTASSIUM BLDV-SCNC: 4.4 MMOL/L — SIGNIFICANT CHANGE UP (ref 3.5–5.1)
POTASSIUM SERPL-MCNC: 4.4 MMOL/L — SIGNIFICANT CHANGE UP (ref 3.5–5.3)
POTASSIUM SERPL-SCNC: 4.4 MMOL/L — SIGNIFICANT CHANGE UP (ref 3.5–5.3)
PROT SERPL-MCNC: 6.7 G/DL — SIGNIFICANT CHANGE UP (ref 6–8.3)
PROTHROM AB SERPL-ACNC: 14.8 SEC — HIGH (ref 10.6–13.6)
RBC # BLD: 4.52 M/UL — SIGNIFICANT CHANGE UP (ref 4.2–5.8)
RBC # BLD: 4.63 M/UL — SIGNIFICANT CHANGE UP (ref 4.2–5.8)
RBC # FLD: 12.1 % — SIGNIFICANT CHANGE UP (ref 10.3–14.5)
RBC # FLD: 12.3 % — SIGNIFICANT CHANGE UP (ref 10.3–14.5)
SAO2 % BLDV: 87.8 % — SIGNIFICANT CHANGE UP (ref 67–88)
SARS-COV-2 IGG+IGM SERPL QL IA: >250 U/ML — HIGH
SARS-COV-2 IGG+IGM SERPL QL IA: POSITIVE
SODIUM SERPL-SCNC: 134 MMOL/L — LOW (ref 135–145)
TRIGL SERPL-MCNC: 79 MG/DL — SIGNIFICANT CHANGE UP
WBC # BLD: 10.4 K/UL — SIGNIFICANT CHANGE UP (ref 3.8–10.5)
WBC # BLD: 8.94 K/UL — SIGNIFICANT CHANGE UP (ref 3.8–10.5)
WBC # FLD AUTO: 10.4 K/UL — SIGNIFICANT CHANGE UP (ref 3.8–10.5)
WBC # FLD AUTO: 8.94 K/UL — SIGNIFICANT CHANGE UP (ref 3.8–10.5)

## 2021-08-24 PROCEDURE — 99255 IP/OBS CONSLTJ NEW/EST HI 80: CPT

## 2021-08-24 PROCEDURE — 99223 1ST HOSP IP/OBS HIGH 75: CPT | Mod: GC

## 2021-08-24 PROCEDURE — 74177 CT ABD & PELVIS W/CONTRAST: CPT | Mod: 26

## 2021-08-24 PROCEDURE — 71275 CT ANGIOGRAPHY CHEST: CPT | Mod: 26

## 2021-08-24 PROCEDURE — 76705 ECHO EXAM OF ABDOMEN: CPT | Mod: 26,RT

## 2021-08-24 RX ORDER — SODIUM CHLORIDE 9 MG/ML
500 INJECTION, SOLUTION INTRAVENOUS
Refills: 0 | Status: DISCONTINUED | OUTPATIENT
Start: 2021-08-24 | End: 2021-08-24

## 2021-08-24 RX ORDER — HEPARIN SODIUM 5000 [USP'U]/ML
3000 INJECTION INTRAVENOUS; SUBCUTANEOUS EVERY 6 HOURS
Refills: 0 | Status: DISCONTINUED | OUTPATIENT
Start: 2021-08-24 | End: 2021-08-26

## 2021-08-24 RX ORDER — HEPARIN SODIUM 5000 [USP'U]/ML
6500 INJECTION INTRAVENOUS; SUBCUTANEOUS EVERY 6 HOURS
Refills: 0 | Status: DISCONTINUED | OUTPATIENT
Start: 2021-08-24 | End: 2021-08-26

## 2021-08-24 RX ORDER — THIAMINE MONONITRATE (VIT B1) 100 MG
500 TABLET ORAL THREE TIMES A DAY
Refills: 0 | Status: COMPLETED | OUTPATIENT
Start: 2021-08-24 | End: 2021-08-29

## 2021-08-24 RX ORDER — HEPARIN SODIUM 5000 [USP'U]/ML
1600 INJECTION INTRAVENOUS; SUBCUTANEOUS
Qty: 25000 | Refills: 0 | Status: DISCONTINUED | OUTPATIENT
Start: 2021-08-24 | End: 2021-08-26

## 2021-08-24 RX ADMIN — Medication 1 TABLET(S): at 11:13

## 2021-08-24 RX ADMIN — HEPARIN SODIUM 1600 UNIT(S)/HR: 5000 INJECTION INTRAVENOUS; SUBCUTANEOUS at 11:12

## 2021-08-24 RX ADMIN — Medication 1 MILLIGRAM(S): at 11:13

## 2021-08-24 RX ADMIN — SODIUM CHLORIDE 50 MILLILITER(S): 9 INJECTION, SOLUTION INTRAVENOUS at 14:59

## 2021-08-24 RX ADMIN — Medication 100 MILLIGRAM(S): at 11:13

## 2021-08-24 RX ADMIN — Medication 105 MILLIGRAM(S): at 21:35

## 2021-08-24 RX ADMIN — HEPARIN SODIUM 1600 UNIT(S)/HR: 5000 INJECTION INTRAVENOUS; SUBCUTANEOUS at 18:11

## 2021-08-24 RX ADMIN — HEPARIN SODIUM 1600 UNIT(S)/HR: 5000 INJECTION INTRAVENOUS; SUBCUTANEOUS at 03:45

## 2021-08-24 RX ADMIN — Medication 2 MILLIGRAM(S): at 14:54

## 2021-08-24 NOTE — CONSULT NOTE ADULT - TIME BILLING
History, physical exam, discussion with patient and team about treatment options, treatment planning

## 2021-08-24 NOTE — PROGRESS NOTE ADULT - SUBJECTIVE AND OBJECTIVE BOX
*****************************************  Camila London, PGY1  Internal Medicine  Pager NS: [phone]  *****************************************      Patient is a 71y old  Male who presents with a chief complaint of 71M p/w Left lower extremity swelling (24 Aug 2021 13:12)      INTERVAL HPI/OVERNIGHT EVENTS:       SUBJECTIVE :          MEDICATIONS  (STANDING):  folic acid 1 milliGRAM(s) Oral daily  heparin  Infusion. 1600 Unit(s)/Hr (16 mL/Hr) IV Continuous <Continuous>  lactated ringers. 500 milliLiter(s) (50 mL/Hr) IV Continuous <Continuous>  multivitamin 1 Tablet(s) Oral daily  thiamine 100 milliGRAM(s) Oral daily    MEDICATIONS  (PRN):  heparin   Injectable 6500 Unit(s) IV Push every 6 hours PRN For aPTT less than 40  heparin   Injectable 3000 Unit(s) IV Push every 6 hours PRN For aPTT between 40 - 57  LORazepam     Tablet 2 milliGRAM(s) Oral every 2 hours PRN Symptom-triggered 2 point increase in CIWA-Ar  LORazepam   Injectable 2 milliGRAM(s) IV Push every 1 hour PRN Symptom-triggered: each CIWA -Ar score 8 or GREATER      CAPILLARY BLOOD GLUCOSE        I&O's Summary    23 Aug 2021 07:01  -  24 Aug 2021 07:00  --------------------------------------------------------  IN: 104 mL / OUT: 250 mL / NET: -146 mL    24 Aug 2021 07:01  -  24 Aug 2021 15:27  --------------------------------------------------------  IN: 240 mL / OUT: 0 mL / NET: 240 mL        PHYSICAL EXAM:  Vital Signs Last 24 Hrs  T(C): 36.6 (24 Aug 2021 14:49), Max: 37.3 (24 Aug 2021 09:23)  T(F): 97.8 (24 Aug 2021 14:49), Max: 99.1 (24 Aug 2021 09:23)  HR: 85 (24 Aug 2021 14:49) (85 - 94)  BP: 151/92 (24 Aug 2021 14:49) (117/76 - 156/68)  BP(mean): --  RR: 16 (24 Aug 2021 14:49) (16 - 18)  SpO2: 97% (24 Aug 2021 14:49) (93% - 98%)    GENERAL: NAD, well-groomed, well-developed  HEAD:  Atraumatic, Normocephalic  EYES: EOMI, conjunctiva and sclera clear  ENMT: No tonsillar erythema, exudates, or enlargement; Moist mucous membranes  NECK: Supple, No JVD,  HEART: Regular rate and rhythm; No murmurs, rubs, or gallops  RESPIRATORY: CTA B/L  ABDOMEN: Soft, Nontender, Nondistended; Bowel sounds present  NEUROLOGY: A&Ox3, nonfocal, moving all extremities  EXTREMITIES:  2+ DP, L lower leg warm, erythematous, swollen, tender to palp  SKIN: warm, dry, normal color, no rash or abnormal lesionsLABS:                        16.0   8.94  )-----------( 122      ( 24 Aug 2021 10:26 )             45.8     08-24    134<L>  |  95<L>  |  18  ----------------------------<  123<H>  4.4   |  22  |  0.84    Ca    9.4      24 Aug 2021 10:26  Phos  3.4     08-23  Mg     2.1     08-23    TPro  6.7  /  Alb  3.6  /  TBili  1.1  /  DBili  x   /  AST  13  /  ALT  14  /  AlkPhos  57  08-24    PT/INR - ( 24 Aug 2021 10:26 )   PT: 14.8 sec;   INR: 1.25 ratio         PTT - ( 24 Aug 2021 10:26 )  PTT:62.4 sec            RADIOLOGY & ADDITIONAL TESTS:  Results Reviewed: Y  Imaging Personally Reviewed:  Electrocardiogram Personally Reviewed:    COORDINATION OF CARE:  Care Discussed with Consultants/Other Providers [Y/N]:  Prior or Outpatient Records Reviewed [Y/N]:   *****************************************  Camila London, PGY1  Internal Medicine  Pager NS: [phone]  *****************************************      Patient is a 71y old  Male who presents with a chief complaint of 71M p/w Left lower extremity swelling (24 Aug 2021 13:12)      INTERVAL HPI/OVERNIGHT EVENTS: NAEO      SUBJECTIVE : Pt seen and examined at bedside. Notes pain in his LLE thats rated 3/10.     Denies CP, SOB, fever/chills, dysuria, diarrhea/constipation.       MEDICATIONS  (STANDING):  folic acid 1 milliGRAM(s) Oral daily  heparin  Infusion. 1600 Unit(s)/Hr (16 mL/Hr) IV Continuous <Continuous>  lactated ringers. 500 milliLiter(s) (50 mL/Hr) IV Continuous <Continuous>  multivitamin 1 Tablet(s) Oral daily  thiamine 100 milliGRAM(s) Oral daily    MEDICATIONS  (PRN):  heparin   Injectable 6500 Unit(s) IV Push every 6 hours PRN For aPTT less than 40  heparin   Injectable 3000 Unit(s) IV Push every 6 hours PRN For aPTT between 40 - 57  LORazepam     Tablet 2 milliGRAM(s) Oral every 2 hours PRN Symptom-triggered 2 point increase in CIWA-Ar  LORazepam   Injectable 2 milliGRAM(s) IV Push every 1 hour PRN Symptom-triggered: each CIWA -Ar score 8 or GREATER      CAPILLARY BLOOD GLUCOSE        I&O's Summary    23 Aug 2021 07:01  -  24 Aug 2021 07:00  --------------------------------------------------------  IN: 104 mL / OUT: 250 mL / NET: -146 mL    24 Aug 2021 07:01  -  24 Aug 2021 15:27  --------------------------------------------------------  IN: 240 mL / OUT: 0 mL / NET: 240 mL        PHYSICAL EXAM:  Vital Signs Last 24 Hrs  T(C): 36.6 (24 Aug 2021 14:49), Max: 37.3 (24 Aug 2021 09:23)  T(F): 97.8 (24 Aug 2021 14:49), Max: 99.1 (24 Aug 2021 09:23)  HR: 85 (24 Aug 2021 14:49) (85 - 94)  BP: 151/92 (24 Aug 2021 14:49) (117/76 - 156/68)  BP(mean): --  RR: 16 (24 Aug 2021 14:49) (16 - 18)  SpO2: 97% (24 Aug 2021 14:49) (93% - 98%)    GENERAL: NAD, well-groomed, well-developed  HEAD:  Atraumatic, Normocephalic  EYES: EOMI, conjunctiva and sclera clear  ENMT: No tonsillar erythema, exudates, or enlargement; Moist mucous membranes  NECK: Supple, No JVD,  HEART: Regular rate and rhythm; No murmurs, rubs, or gallops  RESPIRATORY: CTA B/L  ABDOMEN: Soft, Nontender, Nondistended; Bowel sounds present  NEUROLOGY: A&Ox3, nonfocal, moving all extremities  EXTREMITIES:  2+ DP, L lower leg warm, erythematous, swollen, tender to palp  SKIN: warm, dry, normal color, no rash or abnormal lesionsLABS:                        16.0   8.94  )-----------( 122      ( 24 Aug 2021 10:26 )             45.8     08-24    134<L>  |  95<L>  |  18  ----------------------------<  123<H>  4.4   |  22  |  0.84    Ca    9.4      24 Aug 2021 10:26  Phos  3.4     08-23  Mg     2.1     08-23    TPro  6.7  /  Alb  3.6  /  TBili  1.1  /  DBili  x   /  AST  13  /  ALT  14  /  AlkPhos  57  08-24    PT/INR - ( 24 Aug 2021 10:26 )   PT: 14.8 sec;   INR: 1.25 ratio         PTT - ( 24 Aug 2021 10:26 )  PTT:62.4 sec            RADIOLOGY & ADDITIONAL TESTS:  Results Reviewed: Y  Imaging Personally Reviewed:  Electrocardiogram Personally Reviewed:    COORDINATION OF CARE:  Care Discussed with Consultants/Other Providers [Y/N]:  Prior or Outpatient Records Reviewed [Y/N]:

## 2021-08-24 NOTE — H&P ADULT - ATTENDING COMMENTS
71M w/ EtOH dependence and recent hospitalization for complicated alcohol withdrawal presents w/ acute LLE swelling found to have acute DVT  - NAD, cardiopulmonary benign, calm, LLE with pitting edema, LLE PT present, warmth and color of LLE preserved (does not appear to have threatened limb)  - c/w heparin infusion, given extent of DVT would benefit from vascular surgery or vasc-cards in am to evaluate for endovascular intervention  - [facility] protocol for EtOH withdrawal. required parenteral diazepam in ED    Quan Mejia MD  Medicine Attending  Department of Hospital Medicine  pager: [phone] (available from 20:00 to 08:00)

## 2021-08-24 NOTE — PROGRESS NOTE ADULT - PROBLEM SELECTOR PLAN 3
AG of 19, neg blood alcohol level  Possibly lactic acidosis  -F/u am BMP, lactate, ketones Recent hospitalization for alcohol withdrawal symptoms of seizures and delirium, currently no symptoms or signs of DT  CIWA: 1  US: early cirrhosis     Plan:   -High risk CIWA, sx triggered  -Ativan 2mg PO PRN if CIWA >2  -Ativan 4mg IV q1 hours if CIWA >8  -Thiamine 100mg for three days  -Folate and multivitamin daily  -F/u B12 and folate levels  -f/u hepatitis panel, autoimmune hepatitis panel

## 2021-08-24 NOTE — PROVIDER CONTACT NOTE (OTHER) - ACTION/TREATMENT ORDERED:
MD made aware. RN to continue heparin gtt per IR, no need to hold
MD made aware. will re-draw ptt in 2 hrs
MD made aware. new order to be placed.

## 2021-08-24 NOTE — PROGRESS NOTE ADULT - PROBLEM SELECTOR PLAN 1
Acute lower leg pain and swelling with warmth, extensive thrombus on venous US.  Palpable DP pulses, warm, perfused, low concern for limb ischemia  Most likely provoked DVT i/s/o immobility from recent hospitalization  -Heparin gtt started, long term AC for 6 months  -Outpatient heme follow up  -Vascular surgery consult to evaluate for endovascular intervention Acute lower leg pain and swelling with warmth, extensive thrombus on venous US.  Palpable DP pulses, warm, perfused, low concern for limb ischemia  Most likely provoked DVT i/s/o immobility from recent hospitalization  CT Venogram Abd + Pelvis: Thrombus within the infrarenal IVC extends into the left common iliac vein and left external iliac vein to the left common femoral vein.  CT Chest: Bilateral pulmonary emboli.    Plan  -c/w Heparin gtt, long term AC for 6 months  -Outpatient heme follow up  -Vascular surgery following for intervention f/u recs

## 2021-08-24 NOTE — SBIRT NOTE ADULT - NSSBIRTBRIEFINTDET_GEN_A_CORE
Patient limited with information. Disclosed drinking 2 oz Vodka daily, last reported drink 1 week ago. LMSW reviewed screening results and negative consequences associated with level of risk. He denied treatment and refused resources.

## 2021-08-24 NOTE — PROVIDER CONTACT NOTE (OTHER) - ASSESSMENT
Pt Aox3. temp: 98; HR: 94; BP: 143/82; Sp02:98%
Pt Aox3. temp: 98.3; HR: 86; BP: 136/83; Respirations: 18; Sp02: 95%
pt alert, no s/s of distress

## 2021-08-24 NOTE — H&P ADULT - PROBLEM SELECTOR PLAN 2
Unclear history due to patient's memory  Recent hospitalization for alcohol withdrawal symptoms of seizures and delirium, currently no symptoms or signs of DT  -High risk CIWA, sx triggered  -Ativan 2mg PO PRN if CIWA >2  -Ativan 4mg IV q1 hours if CIWA >8  -Thiamine 100mg for three days  -Folate and multivitamin daily  -F/u B12 and folate levels  -F/u Liver US for possible cirrhosis

## 2021-08-24 NOTE — PROGRESS NOTE ADULT - ASSESSMENT
71M with PMH of alcohol use disorder (recent hospitalization for alcohol withdrawal symptoms of seizures and delirium) presenting with left lower leg swelling and pain, found to have extensive L lower leg DVT most likely i/s/o immobilization from recent hospitalization. Currently no sx of DT.

## 2021-08-24 NOTE — H&P ADULT - HISTORY OF PRESENT ILLNESS
71M with PMH of alcohol use disorder (recent hospitalization for alcohol withdrawal symptoms of seizures and delirium) presenting with left lower leg swelling and pain. Pt is a poor historian and states that he has "memory issues", recalls that he first noticed the swelling and pain 2-3 days ago, denies any falls or trauma. The pain was constant and made it difficult for him to walk. At baseline, he walks without cane or walker without limitations. In the ED, vascular imaging shown to have extensive LLE DVT and heparin gtt started. Denies fevers, chest pain, SOB.    Of note, patient was recently discharged from [place] 2-3 days ago, treated for alcohol withdrawal sx of seizures and delirium. Per patient, first time being hospitalized and per chart has not been hospitalized previously for alcohol withdrawal in [facility]. Pt states that his last drink was five days ago, but in the ED stated it was one day ago and to RN stated 20 days ago. Reports alcohol use 3x a week, 2-3 shots of hard liquor in one occasion. Denies palpitations, tremors, diaphoresis, nausea, or vomiting.    ED vitals:   /88 HR 95 RR 16 98%RA 97.6F  Started on heparin gtt, s/p librium 50mg, diazepam 2mg, diazepam 5mg, thiamine 100mg

## 2021-08-24 NOTE — CONSULT NOTE ADULT - ATTENDING COMMENTS
A/P: 71M with extensive left lower extremity thrombus  -Likely some degree of chronicity as there is calcification noted on CTV within the clot  -Plan to perform left iliocaval thrombectomy/ thrombolysis on 8/25/21--agree with Vascular note and plan for procedure tomorrow  -NPO after midnight  -AM labs  -c/w Heparin drip pre-procedurally  -do b/l LE pulse checks q. 3 hours.

## 2021-08-24 NOTE — H&P ADULT - PROBLEM SELECTOR PLAN 1
Acute lower leg pain and swelling with warmth, extensive thrombus on venous US.  Palpable DP pulses, warm, perfused, low concern for limb ischemia  Most likely provoked DVT i/s/o immobility from recent hospitalization  -Heparin gtt started, long term AC for 6 months  -Outpatient heme follow up  -Vascular surgery consult to evaluate for endovascular intervention

## 2021-08-24 NOTE — CONSULT NOTE ADULT - SUBJECTIVE AND OBJECTIVE BOX
Vascular Cardiology Consult Note    DIRECT SERVICE NUMBER:  [phone]           [email or web address]   OFFICE [phone]    CC:   Left lower extremity swelling    HPI:    71M with PMH of alcohol use disorder (recent hospitalization for alcohol withdrawal symptoms of seizures and delirium) presenting with left lower leg swelling and pain. Pt recently discharged from [place] 2-3 days ago, treated for alcohol withdrawal sx of seizures and delirium. Pt endorses worsening lower ext discomfort x 2-3 days. Denies recent trauma or prolonged immobilization. Pt was evaluated in the [facility] ED and was found to have extensive L DVT.     Pt seen and examined at bedside, currently experiencing left lower pain however has intact sensation, muscle strength and pulses.     Allergies  No Known Allergies      MEDICATIONS:  heparin   Injectable 6500 Unit(s) IV Push every 6 hours PRN  heparin   Injectable 3000 Unit(s) IV Push every 6 hours PRN  heparin  Infusion. 1600 Unit(s)/Hr IV Continuous <Continuous>  LORazepam     Tablet 2 milliGRAM(s) Oral every 2 hours PRN  LORazepam   Injectable 2 milliGRAM(s) IV Push every 1 hour PRN  folic acid 1 milliGRAM(s) Oral daily  multivitamin 1 Tablet(s) Oral daily  thiamine 100 milliGRAM(s) Oral daily    PAST MEDICAL & SURGICAL HISTORY:  History of alcohol use    No significant past surgical history      FAMILY HISTORY:  No pertinent family history in first degree relatives        SOCIAL HISTORY:  ETOH use     REVIEW OF SYSTEMS:  CONSTITUTIONAL: No fever, weight loss, or fatigue  EYES: No eye pain, visual disturbances, or discharge  ENT:  No difficulty hearing, tinnitus, vertigo; No sinus or throat pain  NECK: No pain or stiffness  RESPIRATORY:  Denies shortness of breath   CARDIOVASCULAR:  Denies chest pain or palpitations   GASTROINTESTINAL: No abdominal or epigastric pain. No nausea, vomiting, or hematemesis; No diarrhea or constipation. No melena or hematochezia.  GENITOURINARY: No dysuria, frequency, hematuria, or incontinence  NEUROLOGICAL: No headaches, memory loss, loss of strength, numbness, or tremors  LYMPH Nodes: No enlarged glands  ENDOCRINE: No heat or cold intolerance; No hair loss  MUSCULOSKELETAL: lLE swelling and pain   PSYCHIATRIC: No depression, anxiety, mood swings, or difficulty sleeping  HEME/LYMPH: No easy bruising, or bleeding gums  ALLERGY AND IMMUNOLOGIC: No hives or eczema	    [ x] All others negative	  [ ] Unable to obtain    PHYSICAL EXAM:  T(C): 36.5 (08-24-21 @ 11:30), Max: 37.3 (08-24-21 @ 09:23)  HR: 94 (08-24-21 @ 11:30) (86 - 95)  BP: 134/78 (08-24-21 @ 11:30) (117/76 - 156/68)  RR: 16 (08-24-21 @ 11:30) (16 - 18)  SpO2: 96% (08-24-21 @ 11:30) (93% - 98%)    I&O's Summary    23 Aug 2021 07:01  -  24 Aug 2021 07:00  --------------------------------------------------------  IN: 104 mL / OUT: 250 mL / NET: -146 mL    24 Aug 2021 07:01  -  24 Aug 2021 13:13  --------------------------------------------------------  IN: 240 mL / OUT: 0 mL / NET: 240 mL        Appearance:  	  HEENT:   Normal oral mucosa, PERRL, EOMI	  Carotid: No bruit   Lymphatic: No lymphadenopathy  Cardiovascular:  S1/S2, No murmur  Respiratory: CTAB   Psychiatry:  AAO x 3  Gastrointestinal:  Soft, Non-tender, + BS	  Skin: No rashes, No ecchymoses, No cyanosis	  Neurologic:  Non-focal   Extremities:  LLE warmth and swelling     Vascular Pulse Exam:  Right DP: [x]palpable []non-palpable []audible      Left DP :   [x]palpable []non-palpable []audible         LABS:	 	    CBC Full  -  ( 24 Aug 2021 10:26 )  WBC Count : 8.94 K/uL  Hemoglobin : 16.0 g/dL  Hematocrit : 45.8 %  Platelet Count - Automated : 122 K/uL  Mean Cell Volume : 98.9 fl  Mean Cell Hemoglobin : 34.6 pg  Mean Cell Hemoglobin Concentration : 34.9 gm/dL  Auto Neutrophil # : x  Auto Lymphocyte # : x  Auto Monocyte # : x  Auto Eosinophil # : x  Auto Basophil # : x  Auto Neutrophil % : x  Auto Lymphocyte % : x  Auto Monocyte % : x  Auto Eosinophil % : x  Auto Basophil % : x    08-24    134<L>  |  95<L>  |  18  ----------------------------<  123<H>  4.4   |  22  |  0.84  08-23    131<L>  |  93<L>  |  19  ----------------------------<  139<H>  5.1   |  19<L>  |  1.04    Ca    9.4      24 Aug 2021 10:26  Ca    10.2      23 Aug 2021 14:31  Phos  3.4     08-23  Mg     2.1     08-23    TPro  6.7  /  Alb  3.6  /  TBili  1.1  /  DBili  x   /  AST  13  /  ALT  14  /  AlkPhos  57  08-24  TPro  7.4  /  Alb  4.0  /  TBili  1.5<H>  /  DBili  0.4<H>  /  AST  15  /  ALT  19  /  AlkPhos  65  08-23      < from: [facility] Duplex Lower Ext Vein Scan, Left (08.23.21 @ 17:13) >  FINDINGS:    There is extensive deep venous thrombosis involving the external iliac vein, common femoral vein, femoral vein, deep femoral vein, popliteal vein, tibial peroneal trunk, gastrocnemius and soleal posterior tibial and peroneal veins. There is also thrombus involving the greater saphenous vein, a superficial vein.  The technologist gave these results to physician assistant abnormality no S5 12:00 PM on 8/23/2021    The contralateral common femoral vein is patent.    IMPRESSION:  Extensive occlusive DVT throughout the left lower extremity    --- End of Report ---    < end of copied text >

## 2021-08-24 NOTE — CONSULT NOTE ADULT - ATTENDING COMMENTS
Left leg is edematous and tender, there is some diminished sensation (mild)  Would perform CT venogram a/p as well as CTA chest rule out PE  pending these studies will decide on role for catheter based therapies for the Left venous system (IE thrombecomy or lysis)      Zoe [phone] Left leg is edematous and tender, there is some diminished sensation (mild)  Would perform CT venogram a/p as well as CTA chest rule out PE  pending these studies will decide on role for catheter based therapies for the Left venous system (IE thrombecomy or lysis)      Zoe [phone]    Addendum:  CT reviewed.  He has extensive DVT into the L iliac and IVC  Our fellow will wrap the leg with an ACE wrap to reduce venous congestion  Keep leg elevated  I have discussed the case ith Dr. Kim from IR, plan for catheter based intervention tomorrow  Keep patient NPO after midnight

## 2021-08-24 NOTE — H&P ADULT - NSHPLABSRESULTS_GEN_ALL_CORE
08-23    131<L>  |  93<L>  |  19  ----------------------------<  139<H>  5.1   |  19<L>  |  1.04    Ca    10.2      23 Aug 2021 14:31  Phos  3.4     08-23  Mg     2.1     08-23    TPro  7.4  /  Alb  4.0  /  TBili  1.5<H>  /  DBili  0.4<H>  /  AST  15  /  ALT  19  /  AlkPhos  65  08-23                          17.0   10.88 )-----------( 97       ( 23 Aug 2021 14:31 )             48.8    PT/INR - ( 23 Aug 2021 14:31 )   PT: 15.2 sec;   INR: 1.28 ratio         PTT - ( 23 Aug 2021 23:51 )  PTT:47.5 sec    Blood alcohol level neg    8/23 CTH noncon  No intracranial findings    8/23 Venous US  Extensive deep venous thrombosis involving the external iliac vein, common femoral, femoral, deep femoral, popliteal vein, tibial peronneal trunk, gastroc and soleal posterior tibial and peroneal veins. Thrombus involving greater saphenous vein.   Extensive occlusive DVT throughout left lower extremity I personally reviewed available labs imaging and ekg    131<L>  |  93<L>  |  19  ----------------------------<  139<H>  5.1   |  19<L>  |  1.04    Ca    10.2      23 Aug 2021 14:31  Phos  3.4     08-23  Mg     2.1     08-23    TPro  7.4  /  Alb  4.0  /  TBili  1.5<H>  /  DBili  0.4<H>  /  AST  15  /  ALT  19  /  AlkPhos  65  08-23                          17.0   10.88 )-----------( 97       ( 23 Aug 2021 14:31 )             48.8    PT/INR - ( 23 Aug 2021 14:31 )   PT: 15.2 sec;   INR: 1.28 ratio         PTT - ( 23 Aug 2021 23:51 )  PTT:47.5 sec    Blood alcohol level neg    8/23 CTH noncon  No intracranial findings on my review    8/23 Venous US  Extensive deep venous thrombosis involving the external iliac vein, common femoral, femoral, deep femoral, popliteal vein, tibial peronneal trunk, gastroc and soleal posterior tibial and peroneal veins. Thrombus involving greater saphenous vein.   Extensive occlusive DVT throughout left lower extremity    EKG on my review demonstrates nsr

## 2021-08-24 NOTE — H&P ADULT - NSHPPHYSICALEXAM_GEN_ALL_CORE
PHYSICAL EXAM:  GENERAL: NAD, well-groomed, well-developed  HEAD:  Atraumatic, Normocephalic  EYES: EOMI, PERRLA, conjunctiva and sclera clear  ENMT: No tonsillar erythema, exudates, or enlargement; Moist mucous membranes  NECK: Supple, No JVD,  HEART: Regular rate and rhythm; No murmurs, rubs, or gallops  RESPIRATORY: CTA B/L, No W/R/R  ABDOMEN: Soft, Nontender, Nondistended; Bowel sounds present  NEUROLOGY: A&Ox3, nonfocal, moving all extremities  EXTREMITIES:  2+ DP, L lower leg warm, erythematous, swollen, tender to palp  SKIN: warm, dry, normal color, no rash or abnormal lesions Vital Signs Last 24 Hrs  T(C): 36.8 (24 Aug 2021 03:32), Max: 37.1 (23 Aug 2021 16:25)  T(F): 98.3 (24 Aug 2021 03:32), Max: 98.7 (23 Aug 2021 16:25)  HR: 86 (24 Aug 2021 03:32) (86 - 95)  BP: 136/83 (24 Aug 2021 03:32) (136/83 - 156/68)  RR: 18 (24 Aug 2021 03:32) (16 - 18)  SpO2: 95% (24 Aug 2021 03:32) (95% - 98%)    GENERAL: NAD, well-groomed, well-developed  HEAD:  Atraumatic, Normocephalic  EYES: EOMI, PERRLA, conjunctiva and sclera clear  ENMT: No tonsillar erythema, exudates, or enlargement; Moist mucous membranes  NECK: Supple, No JVD,  HEART: Regular rate and rhythm; No murmurs, rubs, or gallops  RESPIRATORY: CTA B/L, No W/R/R  ABDOMEN: Soft, Nontender, Nondistended; Bowel sounds present  NEUROLOGY: A&Ox3, nonfocal, moving all extremities  EXTREMITIES:  2+ DP, L lower leg warm, erythematous, swollen, tender to palp  SKIN: warm, dry, normal color, no rash or abnormal lesions

## 2021-08-24 NOTE — PROVIDER CONTACT NOTE (OTHER) - SITUATION
Pt currently on heparin gtt, scheduled for IR tomorrow for thrombectomy
dx: DVT of lower extremity
dx: DVT of LLE

## 2021-08-24 NOTE — H&P ADULT - ASSESSMENT
71M with PMH of alcohol use disorder (recent hospitalization for alcohol withdrawal symptoms of seizures and delirium) presenting with left lower leg swelling and pain. Pt is a poor historian and states that he has "memory issues", recalls that he first noticed the swelling and pain 2-3 days ago, denies any falls or trauma. The pain was constant and made it difficult for him to walk. At baseline, he walks without cane or walker without limitations. In the ED, vascular imaging shown to have extensive LLE DVT and heparin gtt started. Denies fevers, chest pain, SOB.    #L lower leg DVT  Acute lower leg pain and swelling with warmth, extensive thrombus on venous US  -Heparin gtt started  -Full AC for 10days, long term AC for 6 months    #Alcohol use disorder  Unclear history due to patient's memory  Recent hospitalization for alcohol withdrawal symptoms of seizures and delirium  -High risk CIWA, sx triggered  -Ativan 2mg PO PRN if CIWA >2  -Ativan 4mg IV q1 hours if CIWA >8  -Thiamine 100mg for three days  -Folic acid 1mg PO daily         71M with PMH of alcohol use disorder (recent hospitalization for alcohol withdrawal symptoms of seizures and delirium) presenting with left lower leg swelling and pain, found to have extensive L lower leg DVT. OB.               71M with PMH of alcohol use disorder (recent hospitalization for alcohol withdrawal symptoms of seizures and delirium) presenting with left lower leg swelling and pain, found to have extensive L lower leg DVT most likely i/s/o immobilization from recent hospitalization. Currently no sx of DT.

## 2021-08-24 NOTE — CONSULT NOTE ADULT - ASSESSMENT
#Extensive LLE DVT consistent with PHLEGMASIA    Plan   Cont Heparin gtt, keep INR therapeutic  Obtain urgent CTA to evaluate potential pulm embolism   Obtain CT LE venogram   Plan for possible thrombectomy this afternoon pending above studies   Keep NPO for now      Tor Seymour MD  Cardiology Fellow  [phone]    Please check [email or web address]: [email or web address] for cardiology service schedule and contact information.  #Extensive LLE DVT consistent with PHLEGMASIA    Plan   Cont Heparin gtt, keep PTT therapeutic  Obtain urgent CTA to evaluate potential pulm embolism   Obtain CT LE venogram   Plan for possible thrombectomy this afternoon pending above studies   Keep NPO for now      Tor Seymour MD  Cardiology Fellow  [phone]    Please check [email or web address]: [email or web address] for cardiology service schedule and contact information.

## 2021-08-24 NOTE — PROGRESS NOTE ADULT - PROBLEM SELECTOR PLAN 2
Unclear history due to patient's memory  Recent hospitalization for alcohol withdrawal symptoms of seizures and delirium, currently no symptoms or signs of DT  -High risk CIWA, sx triggered  -Ativan 2mg PO PRN if CIWA >2  -Ativan 4mg IV q1 hours if CIWA >8  -Thiamine 100mg for three days  -Folate and multivitamin daily  -F/u B12 and folate levels  -F/u Liver US for possible cirrhosis CT Chest: Bilateral pulmonary emboli.    Plan  -c/w Heparin gtt, long term AC for 6 months

## 2021-08-24 NOTE — H&P ADULT - PROBLEM SELECTOR PLAN 4
Total bili 1.5, indirect bili 1.1, thrombocytopenia 97K, Hg 17  -Trend bili  -F/u hemolysis labs (LDH, Retic, haptoglobin Total bili 1.5, indirect bili 1.1, thrombocytopenia 97K, Hg 17  -Trend bili  -F/u hemolysis labs (LDH, Retic, haptoglobin)

## 2021-08-24 NOTE — PROVIDER CONTACT NOTE (OTHER) - REASON
pt on heparin drip. ptt result: 57.0. unable to scan bag, states new order needs to be placed based on pt's weight. New rate should be 16units based on last ptt result.
Ptt drawn early (47.5), MD aware. will re-draw ptt in 2 hrs per heparin drip.
heparin gtt

## 2021-08-24 NOTE — H&P ADULT - NSHPREVIEWOFSYSTEMS_GEN_ALL_CORE
REVIEW OF SYSTEMS:    CONSTITUTIONAL: No weakness, fevers or chills  EYES/ENT: No visual changes;  No vertigo, positive for sore throat  NECK: No pain or stiffness  RESPIRATORY: No cough, wheezing, hemoptysis; No shortness of breath  CARDIOVASCULAR: No chest pain or palpitations  GASTROINTESTINAL: No abdominal or epigastric pain. No nausea, vomiting, or hematemesis; No diarrhea or constipation. No melena or hematochezia.  GENITOURINARY: No dysuria, frequency or hematuria  NEUROLOGICAL: No numbness or weakness  SKIN: No itching, rashes

## 2021-08-24 NOTE — CONSULT NOTE ADULT - SUBJECTIVE AND OBJECTIVE BOX
HPI:  71M with PMH of alcohol use disorder (recent hospitalization for alcohol withdrawal symptoms of seizures and delirium) presenting with left lower leg swelling and pain. Pt is a poor historian and states that he has "memory issues", recalls that he first noticed the swelling and pain 2-3 days ago, denies any falls or trauma. The pain was constant and made it difficult for him to walk. At baseline, he walks without cane or walker without limitations. In the ED, vascular imaging shown to have extensive LLE DVT and heparin gtt started. Denies fevers, chest pain, SOB.    Of note, patient was recently discharged from [place] 2-3 days ago, treated for alcohol withdrawal sx of seizures and delirium. Per patient, first time being hospitalized and per chart has not been hospitalized previously for alcohol withdrawal in [facility]. Pt states that his last drink was five days ago, but in the ED stated it was one day ago and to RN stated 20 days ago. Reports alcohol use 3x a week, 2-3 shots of hard liquor in one occasion. D    Started on heparin gtt, s/p librium 50mg, diazepam 2mg, diazepam 5mg, thiamine 100mg (24 Aug 2021 01:13)    Physical Exam:   Neuro: A & O x3  Abdomen: soft, nt, nd  Left lower extremity: + CFA, + PT + DP, diffuse mild diminishment of LLE sensation but intact from thigh to the foot.  Motor sensation diminished but patient able to move LLE--unable to ambulate due to pain. No skin ecchymosis or evidence ph phlegmasia cerulea dolrens.     Allergies: No Known Allergies    PAST MEDICAL & SURGICAL HISTORY:  History of alcohol use    No significant past surgical history      Pertinent labs:                      16.0   8.94  )-----------( 122      ( 24 Aug 2021 10:26 )             45.8   08-24    134<L>  |  95<L>  |  18  ----------------------------<  123<H>  4.4   |  22  |  0.84    Ca    9.4      24 Aug 2021 10:26  Phos  3.4     08-23  Mg     2.1     08-23    TPro  6.7  /  Alb  3.6  /  TBili  1.1  /  DBili  x   /  AST  13  /  ALT  14  /  AlkPhos  57  08-24  PT/INR - ( 24 Aug 2021 10:26 )   PT: 14.8 sec;   INR: 1.25 ratio         PTT - ( 24 Aug 2021 17:07 )  PTT:74.1 sec    A/P:   -Plan to perform left iliocaval thrombectomy/ thrombolysis on 8/25/21--agree with Vascular note and plan for procedure tomorrow  -Place IR procedure order with Dr. Padilla  -NPO after midnight  -AM labs  -c/w Heparin drip pre-procedurally  -do b/l LE pulse checks q. 3 hours.              HPI:  71M with PMH of alcohol use disorder (recent hospitalization for alcohol withdrawal symptoms of seizures and delirium) presenting with left lower leg swelling and pain. Pt is a poor historian and states that he has "memory issues", recalls that he first noticed the swelling and pain 2-3 days ago, denies any falls or trauma. The pain was constant and made it difficult for him to walk. At baseline, he walks without cane or walker without limitations. In the ED, vascular imaging shown to have extensive LLE DVT and heparin gtt started. Denies fevers, chest pain, SOB.    Of note, patient was recently discharged from [place] 2-3 days ago, treated for alcohol withdrawal sx of seizures and delirium. Per patient, first time being hospitalized and per chart has not been hospitalized previously for alcohol withdrawal in [facility]. Pt states that his last drink was five days ago, but in the ED stated it was one day ago and to RN stated 20 days ago. Reports alcohol use 3x a week, 2-3 shots of hard liquor in one occasion. D    Started on heparin gtt, s/p librium 50mg, diazepam 2mg, diazepam 5mg, thiamine 100mg (24 Aug 2021 01:13)    Physical Exam:   Neuro: A & O x3  Abdomen: soft, nt, nd  Left lower extremity: + CFA, + PT + DP, diffuse mild diminishment of LLE sensation but intact from thigh to the foot.  Motor sensation diminished but patient able to move LLE--unable to ambulate due to pain. No skin ecchymosis or evidence of phlegmasia cerulea dolrens.     Allergies: No Known Allergies    PAST MEDICAL & SURGICAL HISTORY:  History of alcohol use    No significant past surgical history      Pertinent labs:                      16.0   8.94  )-----------( 122      ( 24 Aug 2021 10:26 )             45.8   08-24    134<L>  |  95<L>  |  18  ----------------------------<  123<H>  4.4   |  22  |  0.84    Ca    9.4      24 Aug 2021 10:26  Phos  3.4     08-23  Mg     2.1     08-23    TPro  6.7  /  Alb  3.6  /  TBili  1.1  /  DBili  x   /  AST  13  /  ALT  14  /  AlkPhos  57  08-24  PT/INR - ( 24 Aug 2021 10:26 )   PT: 14.8 sec;   INR: 1.25 ratio         PTT - ( 24 Aug 2021 17:07 )  PTT:74.1 sec    A/P:   -Plan to perform left iliocaval thrombectomy/ thrombolysis on 8/25/21--agree with Vascular note and plan for procedure tomorrow  -Place IR procedure order with Dr. Padilla  -NPO after midnight  -AM labs  -c/w Heparin drip pre-procedurally  -do b/l LE pulse checks q. 3 hours.

## 2021-08-24 NOTE — PROGRESS NOTE ADULT - PROBLEM SELECTOR PLAN 4
Total bili 1.5, indirect bili 1.1, thrombocytopenia 97K, Hg 17  -Trend bili  -F/u hemolysis labs (LDH, Retic, haptoglobin)

## 2021-08-24 NOTE — H&P ADULT - NSHPSOCIALHISTORY_GEN_ALL_CORE
-Lives at home with wife  -Wife currently hospitalized for "breathing problems"  -Has one son Brad  -Alcohol hx as above, denies smoking, recreational drug use

## 2021-08-25 LAB
ALBUMIN SERPL ELPH-MCNC: 3.4 G/DL — SIGNIFICANT CHANGE UP (ref 3.3–5)
ALP SERPL-CCNC: 57 U/L — SIGNIFICANT CHANGE UP (ref 40–120)
ALT FLD-CCNC: 15 U/L — SIGNIFICANT CHANGE UP (ref 10–45)
ANION GAP SERPL CALC-SCNC: 14 MMOL/L — SIGNIFICANT CHANGE UP (ref 5–17)
APTT BLD: 125.2 SEC — CRITICAL HIGH (ref 27.5–35.5)
APTT BLD: 46.4 SEC — HIGH (ref 27.5–35.5)
APTT BLD: >200 SEC — CRITICAL HIGH (ref 27.5–35.5)
AST SERPL-CCNC: 17 U/L — SIGNIFICANT CHANGE UP (ref 10–40)
BILIRUB SERPL-MCNC: 0.8 MG/DL — SIGNIFICANT CHANGE UP (ref 0.2–1.2)
BLD GP AB SCN SERPL QL: NEGATIVE — SIGNIFICANT CHANGE UP
BUN SERPL-MCNC: 16 MG/DL — SIGNIFICANT CHANGE UP (ref 7–23)
CALCIUM SERPL-MCNC: 9.4 MG/DL — SIGNIFICANT CHANGE UP (ref 8.4–10.5)
CHLORIDE SERPL-SCNC: 95 MMOL/L — LOW (ref 96–108)
CO2 SERPL-SCNC: 21 MMOL/L — LOW (ref 22–31)
CREAT SERPL-MCNC: 0.88 MG/DL — SIGNIFICANT CHANGE UP (ref 0.5–1.3)
FOLATE SERPL-MCNC: 14 NG/ML — SIGNIFICANT CHANGE UP
GLUCOSE SERPL-MCNC: 139 MG/DL — HIGH (ref 70–99)
HAPTOGLOB SERPL-MCNC: 313 MG/DL — HIGH (ref 34–200)
HCT VFR BLD CALC: 43.1 % — SIGNIFICANT CHANGE UP (ref 39–50)
HCT VFR BLD CALC: 45 % — SIGNIFICANT CHANGE UP (ref 39–50)
HGB BLD-MCNC: 15.5 G/DL — SIGNIFICANT CHANGE UP (ref 13–17)
HGB BLD-MCNC: 15.9 G/DL — SIGNIFICANT CHANGE UP (ref 13–17)
INR BLD: 1.17 RATIO — HIGH (ref 0.88–1.16)
INR BLD: 1.39 RATIO — HIGH (ref 0.88–1.16)
LDH SERPL L TO P-CCNC: 206 U/L — SIGNIFICANT CHANGE UP (ref 50–242)
MAGNESIUM SERPL-MCNC: 2.1 MG/DL — SIGNIFICANT CHANGE UP (ref 1.6–2.6)
MCHC RBC-ENTMCNC: 34.8 PG — HIGH (ref 27–34)
MCHC RBC-ENTMCNC: 35.3 GM/DL — SIGNIFICANT CHANGE UP (ref 32–36)
MCHC RBC-ENTMCNC: 35.3 PG — HIGH (ref 27–34)
MCHC RBC-ENTMCNC: 36 GM/DL — SIGNIFICANT CHANGE UP (ref 32–36)
MCV RBC AUTO: 98.2 FL — SIGNIFICANT CHANGE UP (ref 80–100)
MCV RBC AUTO: 98.5 FL — SIGNIFICANT CHANGE UP (ref 80–100)
NRBC # BLD: 0 /100 WBCS — SIGNIFICANT CHANGE UP (ref 0–0)
NRBC # BLD: 0 /100 WBCS — SIGNIFICANT CHANGE UP (ref 0–0)
PLATELET # BLD AUTO: 146 K/UL — LOW (ref 150–400)
PLATELET # BLD AUTO: 160 K/UL — SIGNIFICANT CHANGE UP (ref 150–400)
POTASSIUM SERPL-MCNC: 3.9 MMOL/L — SIGNIFICANT CHANGE UP (ref 3.5–5.3)
POTASSIUM SERPL-SCNC: 3.9 MMOL/L — SIGNIFICANT CHANGE UP (ref 3.5–5.3)
PROT SERPL-MCNC: 6.5 G/DL — SIGNIFICANT CHANGE UP (ref 6–8.3)
PROTHROM AB SERPL-ACNC: 13.9 SEC — HIGH (ref 10.6–13.6)
PROTHROM AB SERPL-ACNC: 16.4 SEC — HIGH (ref 10.6–13.6)
RBC # BLD: 4.39 M/UL — SIGNIFICANT CHANGE UP (ref 4.2–5.8)
RBC # BLD: 4.57 M/UL — SIGNIFICANT CHANGE UP (ref 4.2–5.8)
RBC # BLD: 4.57 M/UL — SIGNIFICANT CHANGE UP (ref 4.2–5.8)
RBC # FLD: 11.9 % — SIGNIFICANT CHANGE UP (ref 10.3–14.5)
RBC # FLD: 12.1 % — SIGNIFICANT CHANGE UP (ref 10.3–14.5)
RETICS #: 60.8 K/UL — SIGNIFICANT CHANGE UP (ref 25–125)
RETICS/RBC NFR: 1.3 % — SIGNIFICANT CHANGE UP (ref 0.5–2.5)
RH IG SCN BLD-IMP: POSITIVE — SIGNIFICANT CHANGE UP
SODIUM SERPL-SCNC: 130 MMOL/L — LOW (ref 135–145)
VIT B12 SERPL-MCNC: 1104 PG/ML — SIGNIFICANT CHANGE UP (ref 232–1245)
WBC # BLD: 7.79 K/UL — SIGNIFICANT CHANGE UP (ref 3.8–10.5)
WBC # BLD: 7.89 K/UL — SIGNIFICANT CHANGE UP (ref 3.8–10.5)
WBC # FLD AUTO: 7.79 K/UL — SIGNIFICANT CHANGE UP (ref 3.8–10.5)
WBC # FLD AUTO: 7.89 K/UL — SIGNIFICANT CHANGE UP (ref 3.8–10.5)

## 2021-08-25 PROCEDURE — 99233 SBSQ HOSP IP/OBS HIGH 50: CPT

## 2021-08-25 PROCEDURE — 36012 PLACE CATHETER IN VEIN: CPT | Mod: 59

## 2021-08-25 PROCEDURE — 76937 US GUIDE VASCULAR ACCESS: CPT | Mod: 26,59

## 2021-08-25 PROCEDURE — 37187 VENOUS MECH THROMBECTOMY: CPT

## 2021-08-25 RX ORDER — ONDANSETRON 8 MG/1
4 TABLET, FILM COATED ORAL ONCE
Refills: 0 | Status: DISCONTINUED | OUTPATIENT
Start: 2021-08-25 | End: 2021-08-25

## 2021-08-25 RX ORDER — ACETAMINOPHEN 500 MG
1000 TABLET ORAL ONCE
Refills: 0 | Status: COMPLETED | OUTPATIENT
Start: 2021-08-25 | End: 2021-08-25

## 2021-08-25 RX ORDER — SODIUM CHLORIDE 9 MG/ML
500 INJECTION, SOLUTION INTRAVENOUS
Refills: 0 | Status: DISCONTINUED | OUTPATIENT
Start: 2021-08-25 | End: 2021-08-26

## 2021-08-25 RX ORDER — DEXAMETHASONE 0.5 MG/5ML
8 ELIXIR ORAL ONCE
Refills: 0 | Status: DISCONTINUED | OUTPATIENT
Start: 2021-08-25 | End: 2021-08-25

## 2021-08-25 RX ORDER — HYDROMORPHONE HYDROCHLORIDE 2 MG/ML
0.25 INJECTION INTRAMUSCULAR; INTRAVENOUS; SUBCUTANEOUS
Refills: 0 | Status: DISCONTINUED | OUTPATIENT
Start: 2021-08-25 | End: 2021-08-25

## 2021-08-25 RX ADMIN — HEPARIN SODIUM 1800 UNIT(S)/HR: 5000 INJECTION INTRAVENOUS; SUBCUTANEOUS at 09:55

## 2021-08-25 RX ADMIN — HEPARIN SODIUM 3000 UNIT(S): 5000 INJECTION INTRAVENOUS; SUBCUTANEOUS at 09:57

## 2021-08-25 RX ADMIN — Medication 105 MILLIGRAM(S): at 23:47

## 2021-08-25 RX ADMIN — SODIUM CHLORIDE 60 MILLILITER(S): 9 INJECTION, SOLUTION INTRAVENOUS at 13:01

## 2021-08-25 RX ADMIN — Medication 1000 MILLIGRAM(S): at 13:25

## 2021-08-25 RX ADMIN — Medication 2 MILLIGRAM(S): at 13:07

## 2021-08-25 RX ADMIN — Medication 400 MILLIGRAM(S): at 12:55

## 2021-08-25 RX ADMIN — Medication 105 MILLIGRAM(S): at 13:01

## 2021-08-25 RX ADMIN — Medication 105 MILLIGRAM(S): at 05:52

## 2021-08-25 NOTE — PROGRESS NOTE ADULT - PROBLEM SELECTOR PLAN 3
Recent hospitalization for alcohol withdrawal symptoms of seizures and delirium, currently no symptoms or signs of DT  CIWA: 1  US: early cirrhosis     Plan:   -High risk CIWA, sx triggered  -Ativan 2mg PO PRN if CIWA >2  -Ativan 4mg IV q1 hours if CIWA >8  -Thiamine 100mg for three days  -Folate and multivitamin daily  -F/u B12 and folate levels  -f/u hepatitis panel, autoimmune hepatitis panel

## 2021-08-25 NOTE — PRE PROCEDURE NOTE - PRE PROCEDURE EVALUATION
HPI:  70 yo M old  Male who presents with a chief complaint of 71M p/w Left lower extremity swelling (24 Aug 2021 18:47) found to have extensive DVT within the infrarenal IVC extends that into the left common iliac vein and left external iliac vein to the left common femoral vein. Also found to have B/L PE.     Started on heparin gtt, s/p librium 50mg, diazepam 2mg, diazepam 5mg, thiamine 100mg (24 Aug 2021 01:13)    NPO status: Since midnight  Anticoagulation: Heparin drip  Antibiotics: None    Allergies: No Known Allergies      PAST MEDICAL & SURGICAL HISTORY:  History of alcohol use    No significant past surgical history          Pertinent labs:                      15.9   7.79  )-----------( 160      ( 25 Aug 2021 07:30 )             45.0   08-25    130<L>  |  95<L>  |  16  ----------------------------<  139<H>  3.9   |  21<L>  |  0.88    Ca    9.4      25 Aug 2021 07:30  Mg     2.1     08-25    TPro  6.5  /  Alb  3.4  /  TBili  0.8  /  DBili  x   /  AST  17  /  ALT  15  /  AlkPhos  57  08-25  PT/INR - ( 25 Aug 2021 07:30 )   PT: 13.9 sec;   INR: 1.17 ratio         PTT - ( 25 Aug 2021 07:30 )  PTT:46.4 sec    Consent: Procedure/risks/ Benefits explained. Informed consent obtained. Pt verbalizes understanding.

## 2021-08-25 NOTE — PROGRESS NOTE ADULT - SUBJECTIVE AND OBJECTIVE BOX
HPI:  71M with PMH of alcohol use disorder (recent hospitalization for alcohol withdrawal symptoms of seizures and delirium) presenting with left lower leg swelling and pain. Pt is a poor historian and states that he has "memory issues", recalls that he first noticed the swelling and pain 2-3 days ago, denies any falls or trauma. The pain was constant and made it difficult for him to walk. At baseline, he walks without cane or walker without limitations. In the ED, vascular imaging shown to have extensive LLE DVT and heparin gtt started. Denies fevers, chest pain, SOB.    Of note, patient was recently discharged from [place] 2-3 days ago, treated for alcohol withdrawal sx of seizures and delirium. Per patient, first time being hospitalized and per chart has not been hospitalized previously for alcohol withdrawal in [facility]. Pt states that his last drink was five days ago, but in the ED stated it was one day ago and to RN stated 20 days ago. Reports alcohol use 3x a week, 2-3 shots of hard liquor in one occasion. Denies palpitations, tremors, diaphoresis, nausea, or vomiting.    Patient seen and examined at bedside this AM, patient denies any pain at rest, worsening pain with movement,     Physical Exam:   Neuro: A & O x3  Abdomen: soft, nt, nd  Left lower extremity: + CFA, + PT + DP, stable diffuse mild diminishment of LLE sensation but intact from thigh to the foot.  Motor sensation is again diminished but stable compared to 8/24 and patient able to move LLE--unable to ambulate due to pain. No skin ecchymosis/hyperpigmentation or evidence ph phlegmasia cerulea dolorens.       Allergies:No Known Allergies      PAST MEDICAL & SURGICAL HISTORY:  History of alcohol use    No significant past surgical history      Pertinent labs:                      15.9   7.79  )-----------( 160      ( 25 Aug 2021 07:30 )             45.0   08-24    134<L>  |  95<L>  |  18  ----------------------------<  123<H>  4.4   |  22  |  0.84    Ca    9.4      24 Aug 2021 10:26  Phos  3.4     08-23  Mg     2.1     08-23    TPro  6.7  /  Alb  3.6  /  TBili  1.1  /  DBili  x   /  AST  13  /  ALT  14  /  AlkPhos  57  08-24  PT/INR - ( 24 Aug 2021 10:26 )   PT: 14.8 sec;   INR: 1.25 ratio         PTT - ( 24 Aug 2021 17:07 )  PTT:74.1 sec    A/P:   -Patient stable from 8/24 assessment   -Plan for LLE thrombectomy/ thrombolysis today in afternoon  -Keep patient NPO  -Continue heparin drip, even when patient comes down to IR with transport

## 2021-08-25 NOTE — PROGRESS NOTE ADULT - SUBJECTIVE AND OBJECTIVE BOX
Vascular Cardiology  Progress note      [email or web address]   OFFICE [phone]         INTERVAL HISTORY: No overnight events. Cont to have muscle strength, intact sensation and pulses of left leg.       Allergies  No Known Allergies    	    MEDICATIONS:  heparin   Injectable 6500 Unit(s) IV Push every 6 hours PRN  heparin   Injectable 3000 Unit(s) IV Push every 6 hours PRN  heparin  Infusion. 1600 Unit(s)/Hr IV Continuous <Continuous>  LORazepam     Tablet 2 milliGRAM(s) Oral every 2 hours PRN  LORazepam   Injectable 2 milliGRAM(s) IV Push every 1 hour PRN  folic acid 1 milliGRAM(s) Oral daily  multivitamin 1 Tablet(s) Oral daily  thiamine IVPB 500 milliGRAM(s) IV Intermittent three times a day    PAST MEDICAL & SURGICAL HISTORY:  History of alcohol use    No significant past surgical history      FAMILY HISTORY:  No pertinent family history in first degree relatives      REVIEW OF SYSTEMS:  10 point ROS negative unless otherwise stated     [ x] All others negative	  [ ] Unable to obtain    PHYSICAL EXAM:  T(C): 36.8 (08-25-21 @ 05:17), Max: 36.8 (08-25-21 @ 03:04)  HR: 89 (08-25-21 @ 09:06) (85 - 94)  BP: 136/77 (08-25-21 @ 09:06) (120/76 - 151/92)  RR: 16 (08-25-21 @ 09:06) (16 - 18)  SpO2: 97% (08-25-21 @ 05:17) (95% - 97%)  I&O's Summary    24 Aug 2021 07:01  -  25 Aug 2021 07:00  --------------------------------------------------------  IN: 240 mL / OUT: 575 mL / NET: -335 mL      Appearance:  	  HEENT:   Normal oral mucosa, PERRL, EOMI	  Carotid: No bruit   Lymphatic: No lymphadenopathy  Cardiovascular:  S1/S2, No murmur  Respiratory: CTAB   Psychiatry:  AAO x 3  Gastrointestinal:  Soft, Non-tender, + BS	  Skin: No rashes, No ecchymoses, No cyanosis	  Neurologic:  Non-focal   Extremities:  LLE warmth and swelling     Vascular Pulse Exam:  Right DP: [x]palpable []non-palpable []audible      Left DP :   [x]palpable []non-palpable []audible    LABS:	 	    CBC Full  -  ( 25 Aug 2021 07:30 )  WBC Count : 7.79 K/uL  Hemoglobin : 15.9 g/dL  Hematocrit : 45.0 %  Platelet Count - Automated : 160 K/uL  Mean Cell Volume : 98.5 fl  Mean Cell Hemoglobin : 34.8 pg  Mean Cell Hemoglobin Concentration : 35.3 gm/dL  Auto Neutrophil # : x  Auto Lymphocyte # : x  Auto Monocyte # : x  Auto Eosinophil # : x  Auto Basophil # : x  Auto Neutrophil % : x  Auto Lymphocyte % : x  Auto Monocyte % : x  Auto Eosinophil % : x  Auto Basophil % : x    08-25    130<L>  |  95<L>  |  16  ----------------------------<  139<H>  3.9   |  21<L>  |  0.88  08-24    134<L>  |  95<L>  |  18  ----------------------------<  123<H>  4.4   |  22  |  0.84    Ca    9.4      25 Aug 2021 07:30  Ca    9.4      24 Aug 2021 10:26  Phos  3.4     08-23  Mg     2.1     08-25  Mg     2.1     08-23    TPro  6.5  /  Alb  3.4  /  TBili  0.8  /  DBili  x   /  AST  17  /  ALT  15  /  AlkPhos  57  08-25  TPro  6.7  /  Alb  3.6  /  TBili  1.1  /  DBili  x   /  AST  13  /  ALT  14  /  AlkPhos  57  08-24      < from: CT Abdomen and Pelvis w/ IV Cont (08.24.21 @ 13:56) >  IMPRESSION:    CHEST:  1.  Bilateral pulmonary emboli.  2.  Right lower lobe atelectasis. Left upper lobe nodule measuring 3 mm.  3.  Findings regarding pulmonary embolus were discussed with medical team resident Dr. London by Dr. Putnam with read back at 3:57 PM on 8/24/2021.    ABDOMEN AND PELVIS:    1.  Thrombus within the infrarenal IVC extends into the left common iliac vein and left external iliac vein to the left common femoral vein.  2.  The findings are suspicious for left lower extremity venous thrombosis involving the imaged segments of the common femoral and superficial femoral vein. This was better shown on the recent ultrasound of the lower extremities.    --- End of Report ---      < end of copied text >

## 2021-08-25 NOTE — PROGRESS NOTE ADULT - SUBJECTIVE AND OBJECTIVE BOX
*****************************************  Camila London, PGY1  Internal Medicine  Pager NS: [phone]  *****************************************      Patient is a 71y old  Male who presents with a chief complaint of 71M p/w Left lower extremity swelling (24 Aug 2021 18:47) found to have extensive DVT within the infrarenal IVC extends that into the left common iliac vein and left external iliac vein to the left common femoral vein. Also found to have B/L PE      INTERVAL HPI/OVERNIGHT EVENTS:       SUBJECTIVE :          MEDICATIONS  (STANDING):  folic acid 1 milliGRAM(s) Oral daily  heparin  Infusion. 1600 Unit(s)/Hr (16 mL/Hr) IV Continuous <Continuous>  multivitamin 1 Tablet(s) Oral daily  thiamine IVPB 500 milliGRAM(s) IV Intermittent three times a day    MEDICATIONS  (PRN):  heparin   Injectable 6500 Unit(s) IV Push every 6 hours PRN For aPTT less than 40  heparin   Injectable 3000 Unit(s) IV Push every 6 hours PRN For aPTT between 40 - 57  LORazepam     Tablet 2 milliGRAM(s) Oral every 2 hours PRN Symptom-triggered 2 point increase in CIWA-Ar  LORazepam   Injectable 2 milliGRAM(s) IV Push every 1 hour PRN Symptom-triggered: each CIWA -Ar score 8 or GREATER      CAPILLARY BLOOD GLUCOSE        I&O's Summary    23 Aug 2021 07:01  -  24 Aug 2021 07:00  --------------------------------------------------------  IN: 104 mL / OUT: 250 mL / NET: -146 mL    24 Aug 2021 07:01  -  25 Aug 2021 06:34  --------------------------------------------------------  IN: 240 mL / OUT: 575 mL / NET: -335 mL        PHYSICAL EXAM:  Vital Signs Last 24 Hrs  T(C): 36.8 (25 Aug 2021 05:17), Max: 37.3 (24 Aug 2021 09:23)  T(F): 98.2 (25 Aug 2021 05:17), Max: 99.1 (24 Aug 2021 09:23)  HR: 85 (25 Aug 2021 05:17) (85 - 94)  BP: 134/79 (25 Aug 2021 05:17) (117/76 - 151/92)  BP(mean): --  RR: 18 (25 Aug 2021 05:17) (16 - 18)  SpO2: 97% (25 Aug 2021 05:17) (93% - 97%)    CONSTITUTIONAL: NAD, well-groomed  EYES:  conjunctiva and sclera clear  ENMT: Moist oral mucosa  NECK: Supple, no palpable masses; no JVD  RESPIRATORY: Normal respiratory effort; lungs are clear to auscultation bilaterally  CARDIOVASCULAR: Regular rate and rhythm, normal S1 and S2, no murmur/rub/gallop; No lower extremity edema  ABDOMEN: Nontender to palpation, normoactive bowel sounds, no rebound/guarding  MUSCULOSKELETAL:  no clubbing or cyanosis of digits; no joint swelling or tenderness to palpation  PSYCH: A+O to person, place, and time; affect appropriate  SKIN: No rashes; no palpable lesions    LABS:                        15.5   7.89  )-----------( 146      ( 25 Aug 2021 03:04 )             43.1     08-24    134<L>  |  95<L>  |  18  ----------------------------<  123<H>  4.4   |  22  |  0.84    Ca    9.4      24 Aug 2021 10:26  Phos  3.4     08-23  Mg     2.1     08-23    TPro  6.7  /  Alb  3.6  /  TBili  1.1  /  DBili  x   /  AST  13  /  ALT  14  /  AlkPhos  57  08-24    PT/INR - ( 24 Aug 2021 10:26 )   PT: 14.8 sec;   INR: 1.25 ratio         PTT - ( 24 Aug 2021 17:07 )  PTT:74.1 sec            RADIOLOGY & ADDITIONAL TESTS:  Results Reviewed: y  Imaging Personally Reviewed:  Electrocardiogram Personally Reviewed:    COORDINATION OF CARE:  Care Discussed with Consultants/Other Providers [Y/N]:  Prior or Outpatient Records Reviewed [Y/N]:   *****************************************  Camila London, PGY1  Internal Medicine  Pager NS: [phone]  *****************************************      Patient is a 71y old  Male who presents with a chief complaint of 71M p/w Left lower extremity swelling (24 Aug 2021 18:47) found to have extensive DVT within the infrarenal IVC extends that into the left common iliac vein and left external iliac vein to the left common femoral vein. Also found to have B/L PE      INTERVAL HPI/OVERNIGHT EVENTS: NAEO      SUBJECTIVE : Pt seen and examined at bedside. Notes pain in the LLE.     Denies CP, SOB, fever/chills, dysuria, hematuria, diarrhea/constipation.       MEDICATIONS  (STANDING):  folic acid 1 milliGRAM(s) Oral daily  heparin  Infusion. 1600 Unit(s)/Hr (16 mL/Hr) IV Continuous <Continuous>  multivitamin 1 Tablet(s) Oral daily  thiamine IVPB 500 milliGRAM(s) IV Intermittent three times a day    MEDICATIONS  (PRN):  heparin   Injectable 6500 Unit(s) IV Push every 6 hours PRN For aPTT less than 40  heparin   Injectable 3000 Unit(s) IV Push every 6 hours PRN For aPTT between 40 - 57  LORazepam     Tablet 2 milliGRAM(s) Oral every 2 hours PRN Symptom-triggered 2 point increase in CIWA-Ar  LORazepam   Injectable 2 milliGRAM(s) IV Push every 1 hour PRN Symptom-triggered: each CIWA -Ar score 8 or GREATER      CAPILLARY BLOOD GLUCOSE        I&O's Summary    23 Aug 2021 07:01  -  24 Aug 2021 07:00  --------------------------------------------------------  IN: 104 mL / OUT: 250 mL / NET: -146 mL    24 Aug 2021 07:01  -  25 Aug 2021 06:34  --------------------------------------------------------  IN: 240 mL / OUT: 575 mL / NET: -335 mL        PHYSICAL EXAM:  Vital Signs Last 24 Hrs  T(C): 36.8 (25 Aug 2021 05:17), Max: 37.3 (24 Aug 2021 09:23)  T(F): 98.2 (25 Aug 2021 05:17), Max: 99.1 (24 Aug 2021 09:23)  HR: 85 (25 Aug 2021 05:17) (85 - 94)  BP: 134/79 (25 Aug 2021 05:17) (117/76 - 151/92)  BP(mean): --  RR: 18 (25 Aug 2021 05:17) (16 - 18)  SpO2: 97% (25 Aug 2021 05:17) (93% - 97%)    CONSTITUTIONAL: NAD, well-groomed  EYES:  conjunctiva and sclera clear  ENMT: Moist oral mucosa  NECK: Supple, no palpable masses; no JVD  RESPIRATORY: Normal respiratory effort; lungs are clear to auscultation bilaterally  CARDIOVASCULAR: Regular rate and rhythm, normal S1 and S2, no murmur/rub/gallop; No lower extremity edema  ABDOMEN: Nontender to palpation, normoactive bowel sounds, no rebound/guarding  EXTREMITIES:  2+ DP, L lower leg warm, erythematous, swollen, tender to palp, + femoral pulses   SKIN: warm, dry, normal color, no rash or abnormal lesions  PSYCH: A+O to person, place, and time; affect appropriate    LABS:                        15.5   7.89  )-----------( 146      ( 25 Aug 2021 03:04 )             43.1     08-24    134<L>  |  95<L>  |  18  ----------------------------<  123<H>  4.4   |  22  |  0.84    Ca    9.4      24 Aug 2021 10:26  Phos  3.4     08-23  Mg     2.1     08-23    TPro  6.7  /  Alb  3.6  /  TBili  1.1  /  DBili  x   /  AST  13  /  ALT  14  /  AlkPhos  57  08-24    PT/INR - ( 24 Aug 2021 10:26 )   PT: 14.8 sec;   INR: 1.25 ratio         PTT - ( 24 Aug 2021 17:07 )  PTT:74.1 sec            RADIOLOGY & ADDITIONAL TESTS:  Results Reviewed: y  Imaging Personally Reviewed:  Electrocardiogram Personally Reviewed:    COORDINATION OF CARE:  Care Discussed with Consultants/Other Providers [Y/N]:  Prior or Outpatient Records Reviewed [Y/N]:

## 2021-08-25 NOTE — CHART NOTE - NSCHARTNOTEFT_GEN_A_CORE
CT reviewed  He has extensive clot in the L iliac and even some in the distal IVC  Will wrap the leg now and elevate  Continue heparin gtt  NPO after midnight for thrombectomy and lysis with Dr. Kmi    d/w Zeus Maciel, and Julio Enriquez [phone]
Patient latest PTT > 200, please follow heparin normogram protocol for rechecking ptt// determining when/ dose to restart heparin drip.   In addition, patient must have compression dressing on the left lower extremity as of now (8/25 at 952pm)--d/w PACU nurse, urgent priority to prevent LLE rethrombosis.     d/w PACU nurse, asked to call me when patient has compression dressing on prior to leaving PACU.
This report was requested by: Aleks Nguyen | Reference #: [id number]    Others' Prescriptions  Patient Name: Devyn Dela Cruz Date: 1950  Address: [address] [place] [id number]: Male  Rx Written	Rx Dispensed	Drug	Quantity	Days Supply	Prescriber Name	Prescriber Amy #	Payment Method	Dispenser  08/20/2021	08/20/2021	alprazolam 1 mg tablet	30	10	Ruoff, Saud	[id number]	[facility] #[id number]  07/07/2021	07/08/2021	alprazolam 1 mg tablet	90	30	Ruoff, Saud	[id number]	[facility] #[id number]  05/11/2021	05/12/2021	alprazolam 1 mg tablet	90	30	Ruoff, Saud	[id number]	[facility] #[id number]  03/30/2021	03/30/2021	alprazolam 1 mg tablet	90	30	Ruoff, Saud	[id number]	[facility] #[id number]  02/23/2021	02/23/2021	alprazolam 1 mg tablet	90	30	Ruoff, Saud	[id number]	Insurance	[facility] #[id number]  01/18/2021	01/18/2021	alprazolam 1 mg tablet	90	30	Ruoff, Saud	[id number]	Insurance	[facility] #[id number]  12/04/2020	12/11/2020	alprazolam 1 mg tablet	90	30	Ruoff, Saud	[id number]	Insurance	[facility] #[id number]  11/12/2020	11/13/2020	alprazolam 1 mg tablet	90	30	Ruoff, Saud	[id number]	Insurance	[facility] #[id number]  10/08/2020	10/08/2020	alprazolam 1 mg tablet	90	30	Ruoff, Saud	[id number]	Insurance	[facility] #[id number]  09/02/2020	09/02/2020	alprazolam 1 mg tablet	90	30	Ruoff, Saud	[id number]	Insurance	[facility] #[id number]  * - Drugs marked with an asterisk are compound drugs. If the compound drug is made up of more than one controlled substance, then each controlled substance will be a separate row in the

## 2021-08-25 NOTE — PROCEDURE NOTE - PLAN
Bedrest x 1 hour, after which patient may ambulate as tolerated  Apply compression dressing on the left lower extremity from the level of the thigh to the foot  Keep popliteal dressings c/d/i  continue heparin drip if stat PTT (ordered at 804 pm on 8/25) is less than 200

## 2021-08-25 NOTE — PROGRESS NOTE ADULT - PROBLEM SELECTOR PLAN 1
Acute lower leg pain and swelling with warmth, extensive thrombus on venous US.  Palpable DP pulses, warm, perfused, low concern for limb ischemia  Most likely provoked DVT i/s/o immobility from recent hospitalization  CT Venogram Abd + Pelvis: Thrombus within the infrarenal IVC extends into the left common iliac vein and left external iliac vein to the left common femoral vein.  CT Chest: Bilateral pulmonary emboli.    Plan  -c/w Heparin gtt, long term AC for 6 months  -Outpatient heme follow up  -Vascular surgery following for intervention f/u recs Acute lower leg pain and swelling with warmth, extensive thrombus on venous US.  Palpable DP pulses, warm, perfused, low concern for limb ischemia  Most likely provoked DVT i/s/o immobility from recent hospitalization  CT Venogram Abd + Pelvis: Thrombus within the infrarenal IVC extends into the left common iliac vein and left external iliac vein to the left common femoral vein.  CT Chest: Bilateral pulmonary emboli.    Plan  -c/w Heparin gtt, long term AC for 6 months  -Outpatient heme follow up  -IR planning LLE thrombectomy/ thrombolysis 8/25

## 2021-08-25 NOTE — PROGRESS NOTE ADULT - ASSESSMENT
#Extensive LLE DVT consistent with PHLEGMASIA  -Clot burden includes infrarenal IVC, left common iliac vein, external iliac and common fem vein   -There is diffuse tenderness of LLE, however strength, sensation and pulses are largely intact despite being somewhat diminished   #Bilateral pulmonary embolism       Plan   Cont Heparin gtt, keep PTT therapeutic  Plan for IR guided thrombolysis/lectomy this afternoon   NPO for now    Pulse check q 3 hrs     Tor Seymour MD  Cardiology Fellow  [phone]    Please check [email or web address]: [email or web address] for cardiology service schedule and contact information.  #Extensive LLE DVT consistent with PHLEGMASIA  -Clot burden includes infrarenal IVC, left common iliac vein, external iliac and common fem vein   -There is diffuse tenderness of LLE, however strength, sensation and pulses are largely intact despite being somewhat diminished   #Bilateral pulmonary embolism       Plan   Cont Heparin gtt, keep PTT therapeutic  Plan for IR guided thrombolysis/lectomy this afternoon   NPO for now    Pulse check q 3 hrs   Recc checking PSA for malignancy screen   Recc Liver US given lesions noted on CT abd/pelvis     Tor Seymour MD  Cardiology Fellow  [phone]    Please check [email or web address]: [email or web address] for cardiology service schedule and contact information.

## 2021-08-26 ENCOUNTER — TRANSCRIPTION ENCOUNTER (OUTPATIENT)
Age: 71
End: 2021-08-26

## 2021-08-26 LAB
ALBUMIN SERPL ELPH-MCNC: 2.9 G/DL — LOW (ref 3.3–5)
ALP SERPL-CCNC: 51 U/L — SIGNIFICANT CHANGE UP (ref 40–120)
ALT FLD-CCNC: 13 U/L — SIGNIFICANT CHANGE UP (ref 10–45)
ANION GAP SERPL CALC-SCNC: 12 MMOL/L — SIGNIFICANT CHANGE UP (ref 5–17)
APTT BLD: 55.4 SEC — HIGH (ref 27.5–35.5)
APTT BLD: 62.8 SEC — HIGH (ref 27.5–35.5)
AST SERPL-CCNC: 11 U/L — SIGNIFICANT CHANGE UP (ref 10–40)
BILIRUB SERPL-MCNC: 0.9 MG/DL — SIGNIFICANT CHANGE UP (ref 0.2–1.2)
BUN SERPL-MCNC: 12 MG/DL — SIGNIFICANT CHANGE UP (ref 7–23)
CALCIUM SERPL-MCNC: 8.7 MG/DL — SIGNIFICANT CHANGE UP (ref 8.4–10.5)
CHLORIDE SERPL-SCNC: 100 MMOL/L — SIGNIFICANT CHANGE UP (ref 96–108)
CO2 SERPL-SCNC: 21 MMOL/L — LOW (ref 22–31)
CREAT SERPL-MCNC: 0.8 MG/DL — SIGNIFICANT CHANGE UP (ref 0.5–1.3)
GLUCOSE SERPL-MCNC: 166 MG/DL — HIGH (ref 70–99)
HCT VFR BLD CALC: 40.6 % — SIGNIFICANT CHANGE UP (ref 39–50)
HGB BLD-MCNC: 14.3 G/DL — SIGNIFICANT CHANGE UP (ref 13–17)
MAGNESIUM SERPL-MCNC: 2.1 MG/DL — SIGNIFICANT CHANGE UP (ref 1.6–2.6)
MCHC RBC-ENTMCNC: 35 PG — HIGH (ref 27–34)
MCHC RBC-ENTMCNC: 35.2 GM/DL — SIGNIFICANT CHANGE UP (ref 32–36)
MCV RBC AUTO: 99.5 FL — SIGNIFICANT CHANGE UP (ref 80–100)
NRBC # BLD: 0 /100 WBCS — SIGNIFICANT CHANGE UP (ref 0–0)
OSMOLALITY UR: 318 MOS/KG — SIGNIFICANT CHANGE UP (ref 300–900)
PHOSPHATE SERPL-MCNC: 3.7 MG/DL — SIGNIFICANT CHANGE UP (ref 2.5–4.5)
PLATELET # BLD AUTO: 145 K/UL — LOW (ref 150–400)
POTASSIUM SERPL-MCNC: 4.6 MMOL/L — SIGNIFICANT CHANGE UP (ref 3.5–5.3)
POTASSIUM SERPL-SCNC: 4.6 MMOL/L — SIGNIFICANT CHANGE UP (ref 3.5–5.3)
PROT SERPL-MCNC: 5.9 G/DL — LOW (ref 6–8.3)
RBC # BLD: 4.08 M/UL — LOW (ref 4.2–5.8)
RBC # FLD: 11.9 % — SIGNIFICANT CHANGE UP (ref 10.3–14.5)
SODIUM SERPL-SCNC: 133 MMOL/L — LOW (ref 135–145)
WBC # BLD: 8.12 K/UL — SIGNIFICANT CHANGE UP (ref 3.8–10.5)
WBC # FLD AUTO: 8.12 K/UL — SIGNIFICANT CHANGE UP (ref 3.8–10.5)

## 2021-08-26 PROCEDURE — 99231 SBSQ HOSP IP/OBS SF/LOW 25: CPT

## 2021-08-26 PROCEDURE — 99233 SBSQ HOSP IP/OBS HIGH 50: CPT

## 2021-08-26 PROCEDURE — 12345: CPT | Mod: NC,GC

## 2021-08-26 RX ORDER — RIVAROXABAN 15 MG-20MG
15 KIT ORAL
Refills: 0 | Status: DISCONTINUED | OUTPATIENT
Start: 2021-08-26 | End: 2021-08-29

## 2021-08-26 RX ORDER — HEPARIN SODIUM 5000 [USP'U]/ML
INJECTION INTRAVENOUS; SUBCUTANEOUS
Qty: 25000 | Refills: 0 | Status: DISCONTINUED | OUTPATIENT
Start: 2021-08-26 | End: 2021-08-26

## 2021-08-26 RX ORDER — HEPARIN SODIUM 5000 [USP'U]/ML
3000 INJECTION INTRAVENOUS; SUBCUTANEOUS EVERY 6 HOURS
Refills: 0 | Status: DISCONTINUED | OUTPATIENT
Start: 2021-08-26 | End: 2021-08-27

## 2021-08-26 RX ORDER — HEPARIN SODIUM 5000 [USP'U]/ML
6500 INJECTION INTRAVENOUS; SUBCUTANEOUS ONCE
Refills: 0 | Status: DISCONTINUED | OUTPATIENT
Start: 2021-08-26 | End: 2021-08-26

## 2021-08-26 RX ORDER — HEPARIN SODIUM 5000 [USP'U]/ML
6500 INJECTION INTRAVENOUS; SUBCUTANEOUS EVERY 6 HOURS
Refills: 0 | Status: DISCONTINUED | OUTPATIENT
Start: 2021-08-26 | End: 2021-08-27

## 2021-08-26 RX ORDER — HEPARIN SODIUM 5000 [USP'U]/ML
6500 INJECTION INTRAVENOUS; SUBCUTANEOUS EVERY 6 HOURS
Refills: 0 | Status: DISCONTINUED | OUTPATIENT
Start: 2021-08-26 | End: 2021-08-26

## 2021-08-26 RX ORDER — HEPARIN SODIUM 5000 [USP'U]/ML
3000 INJECTION INTRAVENOUS; SUBCUTANEOUS EVERY 6 HOURS
Refills: 0 | Status: DISCONTINUED | OUTPATIENT
Start: 2021-08-26 | End: 2021-08-26

## 2021-08-26 RX ADMIN — Medication 2 MILLIGRAM(S): at 10:57

## 2021-08-26 RX ADMIN — RIVAROXABAN 15 MILLIGRAM(S): KIT at 19:00

## 2021-08-26 RX ADMIN — Medication 105 MILLIGRAM(S): at 05:40

## 2021-08-26 RX ADMIN — Medication 1 MILLIGRAM(S): at 13:41

## 2021-08-26 RX ADMIN — Medication 105 MILLIGRAM(S): at 16:08

## 2021-08-26 RX ADMIN — HEPARIN SODIUM 1400 UNIT(S)/HR: 5000 INJECTION INTRAVENOUS; SUBCUTANEOUS at 15:50

## 2021-08-26 RX ADMIN — HEPARIN SODIUM 1400 UNIT(S)/HR: 5000 INJECTION INTRAVENOUS; SUBCUTANEOUS at 07:48

## 2021-08-26 RX ADMIN — Medication 105 MILLIGRAM(S): at 21:47

## 2021-08-26 RX ADMIN — HEPARIN SODIUM 1400 UNIT(S)/HR: 5000 INJECTION INTRAVENOUS; SUBCUTANEOUS at 00:45

## 2021-08-26 RX ADMIN — Medication 1 TABLET(S): at 13:41

## 2021-08-26 NOTE — DISCHARGE NOTE PROVIDER - CARE PROVIDER_API CALL
Tobin Maradiaga)  Gastroenterology; Internal Medicine  [address] [place] [address]  Phone: [phone]  Fax: [phone]  Follow Up Time: 2 weeks    Saud Queen  INTERNAL MEDICINE  [address] [place] [address]  Phone: [phone]  Fax: [phone]  Established Patient  Follow Up Time: 2 weeks   Tobin Maradiaga (MD)  Gastroenterology; Internal Medicine  [address] [place] [address]  Phone: [phone]  Fax: [phone]  Follow Up Time: 2 weeks    Saud Queen  INTERNAL MEDICINE  [address] [place] [address]  Phone: [phone]  Fax: [phone]  Established Patient  Follow Up Time: 2 weeks    Thee Enriquez (DO)  Cardiovascular Disease; Internal Medicine; Nuclear Cardiology  [address] [place] [address]  Phone: [phone]  Fax: [phone]  Scheduled Appointment: 10/05/2021 08:00 AM

## 2021-08-26 NOTE — PHYSICAL THERAPY INITIAL EVALUATION ADULT - ADDITIONAL COMMENTS
Pt lives w/ her spouse in a pvt home has 1 flight of stairs at entry +UHR, once inside first floor set up.

## 2021-08-26 NOTE — PROGRESS NOTE ADULT - ATTENDING COMMENTS
d/w HS team  dc heparin and switch to xarelto tonight 7p per vasc-cards recs  CT findings reveiwed- hepatology cs     [facility]

## 2021-08-26 NOTE — DISCHARGE NOTE PROVIDER - HOSPITAL COURSE
HPI:   Mr. Winkler is a 72 Y/O M with PMHx of alcohol use disorder (recent hospitalization for alcohol withdrawal symptoms of seizures and delirium) presenting with left lower leg swelling and pain. Pt is a poor historian and states that he has "memory issues", recalls that he first noticed the swelling and pain 2-3 days ago, denies any falls or trauma. The pain was constant and made it difficult for him to walk. At baseline, he walks without cane or walker without limitations. In the ED, vascular imaging shown to have extensive LLE DVT and heparin gtt started. Denies fevers, chest pain, SOB.    Of note, patient was recently discharged from [place] 2-3 days ago, treated for alcohol withdrawal sx of seizures and delirium. Per patient, first time being hospitalized and per chart has not been hospitalized previously for alcohol withdrawal in [facility]. Pt states that his last drink was five days ago, but in the ED stated it was one day ago and to RN stated 20 days ago. Reports alcohol use 3x a week, 2-3 shots of hard liquor in one occasion. Denies palpitations, tremors, diaphoresis, nausea, or vomiting. In the ED pt was started on heparin gtt.     Hospital Course:   US showed extensive occlusive DVT throughout LL extremity. CT Abdomen and Pelvis showed thrombus within the infrarenal IVC that extends into the L. common iliac vein and L. external iliac vein to the L. common femoral vein. CT Chest showed B/L pulmonary emboli. Pt was continued on the heparin gtt. Pt underwent IR intervention on 8/25 and patient is status post iliocaval venous thrombosis mechanical thrombectomy with post thrombectomy venogram shows patent IVC and iliofemoral venous system. Pt tolerated the procedure well.     US Abdomen showed evidence of early cirrhosis. CT Abd and Pelvis showed bilobar hypodense hepatic lesions that needs to be worked up further as outpatient.     Pt is hemodynamically stable and is medically optimized for discharge with close follow up.                HPI:   Mr. Winkler is a 72 Y/O M with PMHx of alcohol use disorder (recent hospitalization for alcohol withdrawal symptoms of seizures and delirium) presenting with left lower leg swelling and pain. Pt is a poor historian and states that he has "memory issues", recalls that he first noticed the swelling and pain 2-3 days ago, denies any falls or trauma. The pain was constant and made it difficult for him to walk. At baseline, he walks without cane or walker without limitations. In the ED, vascular imaging shown to have extensive LLE DVT and heparin gtt started. Denies fevers, chest pain, SOB.    Of note, patient was recently discharged from [facility] 2-3 days ago, treated for alcohol withdrawal sx of seizures and delirium. Per patient, first time being hospitalized and per chart has not been hospitalized previously for alcohol withdrawal in [facility]. Pt states that his last drink was five days ago, but in the ED stated it was one day ago and to RN stated 20 days ago. Reports alcohol use 3x a week, 2-3 shots of hard liquor in one occasion. Denies palpitations, tremors, diaphoresis, nausea, or vomiting. In the ED pt was started on heparin gtt.     Hospital Course:   US showed extensive occlusive DVT throughout LL extremity. CT Abdomen and Pelvis showed thrombus within the infrarenal IVC that extends into the L. common iliac vein and L. external iliac vein to the L. common femoral vein. CT Chest showed B/L pulmonary emboli. Pt was continued on the heparin gtt. Pt underwent IR intervention on 8/25 and patient is status post iliocaval venous thrombosis mechanical thrombectomy with post thrombectomy venogram shows patent IVC and iliofemoral venous system. Pt tolerated the procedure well.     US Abdomen showed evidence of early cirrhosis. CT Abd and Pelvis showed bilobar hypodense hepatic lesions that showed ----     Pt is hemodynamically stable and is medically optimized for discharge with close follow up.                HPI:   Mr. Winkler is a 70 Y/O M with PMHx of alcohol use disorder (recent hospitalization for alcohol withdrawal symptoms of seizures and delirium) presenting with left lower leg swelling and pain. Pt is a poor historian and states that he has "memory issues", recalls that he first noticed the swelling and pain 2-3 days ago, denies any falls or trauma. The pain was constant and made it difficult for him to walk. At baseline, he walks without cane or walker without limitations. In the ED, vascular imaging shown to have extensive LLE DVT and heparin gtt started. Denies fevers, chest pain, SOB.    Of note, patient was recently discharged from [place] 2-3 days ago, treated for alcohol withdrawal sx of seizures and delirium. Per patient, first time being hospitalized and per chart has not been hospitalized previously for alcohol withdrawal in [facility]. Pt states that his last drink was five days ago, but in the ED stated it was one day ago and to RN stated 20 days ago. Reports alcohol use 3x a week, 2-3 shots of hard liquor in one occasion. Denies palpitations, tremors, diaphoresis, nausea, or vomiting. In the ED pt was started on heparin gtt.     Hospital Course:   US showed extensive occlusive DVT throughout LL extremity. CT Abdomen and Pelvis showed thrombus within the infrarenal IVC that extends into the L. common iliac vein and L. external iliac vein to the L. common femoral vein. CT Chest showed B/L pulmonary emboli. Pt was continued on the heparin gtt. Pt underwent IR intervention on 8/25 and patient is status post iliocaval venous thrombosis mechanical thrombectomy with post thrombectomy venogram shows patent IVC and iliofemoral venous system. Pt tolerated the procedure well and was noted to have significant improvement in symptoms.     US Abdomen showed evidence of early cirrhosis. CT Abd and Pelvis showed bilobar hypodense hepatic lesions that was further assessed with MRI that showed -----------. AFB negative; acute hepatitis panel negative; autoimmune panel was negative;     Pt is hemodynamically stable and is medically optimized for discharge with close follow up as                HPI:   Mr. Winkler is a 72 Y/O M with PMHx of alcohol use disorder (recent hospitalization for alcohol withdrawal symptoms of seizures and delirium) presenting with left lower leg swelling and pain. Pt is a poor historian and states that he has "memory issues", recalls that he first noticed the swelling and pain 2-3 days ago, denies any falls or trauma. The pain was constant and made it difficult for him to walk. At baseline, he walks without cane or walker without limitations. In the ED, vascular imaging shown to have extensive LLE DVT and heparin gtt started. Denies fevers, chest pain, SOB.    Of note, patient was recently discharged from [place] 2-3 days ago, treated for alcohol withdrawal sx of seizures and delirium. Per patient, first time being hospitalized and per chart has not been hospitalized previously for alcohol withdrawal in [facility]. Pt states that his last drink was five days ago, but in the ED stated it was one day ago and to RN stated 20 days ago. Reports alcohol use 3x a week, 2-3 shots of hard liquor in one occasion. Denies palpitations, tremors, diaphoresis, nausea, or vomiting. In the ED pt was started on heparin gtt.     Hospital Course:   US showed extensive occlusive DVT throughout LL extremity. CT Abdomen and Pelvis showed thrombus within the infrarenal IVC that extends into the L. common iliac vein and L. external iliac vein to the L. common femoral vein. CT Chest showed B/L pulmonary emboli. Pt was continued on the heparin gtt. Pt underwent IR intervention on 8/25 and patient is status post iliocaval venous thrombosis mechanical thrombectomy with post thrombectomy venogram shows patent IVC and iliofemoral venous system. Pt tolerated the procedure well and was noted to have significant improvement in symptoms.     US Abdomen showed evidence of early cirrhosis. CT Abd and Pelvis showed bilobar hypodense hepatic lesions that was further assessed with MRI that showed it to be hemangiomas that were decreased from prior. AFB negative; acute hepatitis panel negative; autoimmune panel was negative;     Pt is hemodynamically stable and is medically optimized for discharge with close follow up with the hematologist, vascular cardiologist and gastroenterologist (for screening colonoscopy as well as early cirrhosis management) and PCP. Pt is to continue Xarelto 15 mg twice a day with meals x 21 days, then transition to 20 mg QD after 21 days.

## 2021-08-26 NOTE — PHYSICAL THERAPY INITIAL EVALUATION ADULT - PRECAUTIONS/LIMITATIONS, REHAB EVAL
CTA&P: Thrombus within the infrarenal IVC extends into the left common iliac vein and left external iliac vein to the left common femoral vein. The findings are suspicious for left lower extremity venous thrombosis involving the imaged segments of the common femoral and superficial femoral vein./cardiac precautions

## 2021-08-26 NOTE — CONSULT NOTE ADULT - SUBJECTIVE AND OBJECTIVE BOX
Chief Complaint:  Patient is a 71y old  Male who presents with a chief complaint of 71M p/w Left lower extremity swelling (26 Aug 2021 11:42)      HPI: 71M with PMH of alcohol use disorder (recent hospitalization at [place] 2-3 days ago for alcohol withdrawal symptoms of seizures and delirium) presenting with left lower leg swelling and pain. Workup here revealed DVT and PE, s/p Iliocaval venous thrombosis mechanical thrombectomy by IR 8/25, currently on a heparin gtt. RUQ US showing hepatic steatosis vs early cirrhosis. CT A/P showing bilobar hypodense hepatic lesions- L lobe lesion 3.5 x 2.8 cm, R lobe 1.1 x 1.0 cm, caudate lobe 1.4 x 1.2 cm.  Hepatology consulted due to liver lesions.    Reports alcohol use 3x a week, 2-3 shots of hard liquor in one occasion. Denies palpitations, tremors, diaphoresis, nausea, or vomiting.      Allergies:  No Known Allergies      Home Medications:    Hospital Medications:  folic acid 1 milliGRAM(s) Oral daily  heparin   Injectable 6500 Unit(s) IV Push every 6 hours PRN  heparin   Injectable 3000 Unit(s) IV Push every 6 hours PRN  heparin  Infusion.  Unit(s)/Hr IV Continuous <Continuous>  lactated ringers. 500 milliLiter(s) IV Continuous <Continuous>  LORazepam     Tablet 2 milliGRAM(s) Oral every 2 hours PRN  LORazepam   Injectable 2 milliGRAM(s) IV Push every 1 hour PRN  multivitamin 1 Tablet(s) Oral daily  thiamine IVPB 500 milliGRAM(s) IV Intermittent three times a day      PMHX/PSHX:  Alcohol abuse    History of alcohol use    No significant past surgical history        Family history:  No pertinent family history in first degree relatives        Social History:     ROS:     General:  No weight loss, fevers, chills, night sweats, fatigue  Eyes:  No vision changes, no yellowing of eyes   ENT:  No throat pain, runny nose  CV:  No chest pain, palpitations  Resp:  No SOB, cough, wheezing  GI:  See HPI  :  No burning with urination, no hematuria   Muscle:  No muscle pain, weakness  Neuro:  No numbness/tingling, memory problems  Psych:  No fatigue, insomnia, mood problems  Heme:  No easy bruisability  Skin:  No rash, itching       PHYSICAL EXAM:     GENERAL:  Appears stated age, well-groomed, well-nourished, no distress  HEENT:  NC/AT,  conjunctivae clear and pink  CHEST:  Full & symmetric excursion, no increased effort, breath sounds clear  HEART:  Regular rhythm, S1, S2, no murmur/rub/S3/S4  ABDOMEN:  Soft, non-tender, non-distended, normoactive bowel sounds  EXTREMITIES:  no cyanosis, clubbing or edema  SKIN:  No rash/erythema/ecchymoses/petechiae/wounds/abscess/warm/dry  NEURO:  Alert, oriented    Vital Signs:  Vital Signs Last 24 Hrs  T(C): 36.8 (26 Aug 2021 11:00), Max: 36.9 (25 Aug 2021 16:26)  T(F): 98.2 (26 Aug 2021 11:00), Max: 98.4 (25 Aug 2021 16:26)  HR: 82 (26 Aug 2021 11:00) (76 - 97)  BP: 140/78 (26 Aug 2021 11:00) (111/56 - 145/79)  BP(mean): 96 (25 Aug 2021 22:30) (75 - 107)  RR: 18 (26 Aug 2021 05:12) (16 - 20)  SpO2: 96% (26 Aug 2021 11:00) (94% - 100%)  Daily Height in cm: 177.8 (25 Aug 2021 16:41)    Daily     LABS:                        14.3   8.12  )-----------( 145      ( 26 Aug 2021 07:08 )             40.6     08-26    133<L>  |  100  |  12  ----------------------------<  166<H>  4.6   |  21<L>  |  0.80    Ca    8.7      26 Aug 2021 07:06  Phos  3.7     08-26  Mg     2.1     08-26    TPro  5.9<L>  /  Alb  2.9<L>  /  TBili  0.9  /  DBili  x   /  AST  11  /  ALT  13  /  AlkPhos  51  08-26    LIVER FUNCTIONS - ( 26 Aug 2021 07:06 )  Alb: 2.9 g/dL / Pro: 5.9 g/dL / ALK PHOS: 51 U/L / ALT: 13 U/L / AST: 11 U/L / GGT: x           PT/INR - ( 25 Aug 2021 20:43 )   PT: 16.4 sec;   INR: 1.39 ratio         PTT - ( 26 Aug 2021 14:05 )  PTT:55.4 sec        Imaging:        EXAM:  US ABDOMEN RT UPR QUADRANT                            PROCEDURE DATE:  08/24/2021            INTERPRETATION:  CLINICAL INFORMATION: Alcohol use disorder. Evaluate for cirrhosis.    COMPARISON: None available.    TECHNIQUE: Sonography of the right upper quadrant.    FINDINGS:    Liver: Increase parenchymal echogenicity, compatible with steatosis. Perihepatic lobe measures 14.9 cm. Mild surface contour nodularity. No sonographic evidence of liver mass.  Bile ducts: Normal caliber. Common bile duct measures 4 mm.  Gallbladder: Biliary sludge. No gallstone. No wall thickening or pericholecystic fluid.  Pancreas: Increased parenchymal echogenicity, compatible with fatty replacement of pancreatic parenchyma.  Right kidney: 11.6 cm. Mild hydronephrosis. Interpolar and lower pole parapelvic cyst measuring 0.9 x 1.3 x 1.4 and 1.0 x 2.2 x 1.5 cm respectively.  Ascites: None.  IVC: Visualized portions are within normal limits.    IMPRESSION:  Steatotic liver with mild surface contour regularity suggestive of hepatic parenchymal disease versus early cirrhosis.  Mild right-sided hydronephrosis without identified cause.      INTERPRETATION:  CTA CHEST    INDICATION: Extensive DVT, evaluate for pulmonary embolus.    TECHNIQUE: Enhanced helical images were obtained of the chest. Coronal and sagittal images were reconstructed.  Images were obtained after the uneventful administration of 90 cc of nonionic intravenous contrast (Omnipaque 350). 10 cc of nonionic intravenous contrast (Omnipaque 350) was discarded. Maximum intensity projection images were generated.    COMPARISON: CTA heart with coronary stent 12/20/2011. Ultrasound 8/23/2021.    FINDINGS:    Pulmonary Artery:Pulmonary embolus in a right lobar pulmonary artery branch with extension into segmental and subsegmental branches. In particular, the emboli involve the posterior segmental artery of the right lower lobe. There is also a pulmonary embolus in the posterior division of the apical posterior segmental artery of the left upper lobe.    Tubes/Lines: None.    Lungs, airways and pleura: Within the posterior segment of the right lower lobe is a rhomboid shaped ground glass opacity. No pleural effusion. Left upper lobe is a 3 mm noncalcified nodule. Remainder of the lungs are clear. The airways and pleura are normal.    Mediastinum: The thyroid gland and esophagus are normal. The aorta is normal in caliber. Aortic calcifications. The heart is normal in size    ABDOMEN AND PELVIS:    Liver, gallbladder and biliary system: There are bilobar hypodense hepatic lesions. The lesions measure:  The left lobe lesion the subtle area of peripheral enhancement and measures 3.5 x 2.8 cm.  The right lobe lesionmeasures 1.1 x 1.0 cm.  There is an additional lesion in the caudate lobe of the liver. The lesion measures 1.4 x 1.2 cm.    The remainder of the liver is normal. There is no intrahepatic or extrahepatic biliary ductal dilatation    Pancreas: Within normal limits.    Spleen: Within normal limits.    Adrenal glands and kidneys: Bilateral parapelvic cysts. The kidneys are otherwise normal. No hydronephrosis. No hydroureter.    Lymph nodes: There are no enlarged retroperitoneal upper abdominal lymphnodes.    Bowel: The stomach is unremarkable. The large and small bowel are normal in caliber. The appendix is is not definitively visualized. There is no free air. There is no free fluid or free air.    Vascular: Atherosclerotic calcifications in the descending aorta. Aorta is normal in caliber. The major branches of the aorta are approximately patent.    The portal vein and hepatic veins are patent.    There is thrombus within the infrarenal IVC. There is a superior and inferior right renal vein. Single left renal vein. The thrombus is approximately 3 cm inferior to the inferior right renal vein.    The thrombus extends into the left common iliac vein and left external iliac vein to the left common femoral vein. The left common femoral veinis enlarged and centrally contains a area of low attenuation (series 5 image 142). This likely extends into the left superficial femoral vein and saphenous vein. In addition, there is subcutaneous stranding in the left lower extremity.    Bladder andreproductive system: The prostate gland is unremarkable. The bladder is unremarkable.    Bones And Soft Tissues: The bones are unremarkable.  In addition, there is subcutaneous stranding in the left lower extremity.      IMPRESSION:    CHEST:  1.  Bilateral pulmonary emboli.  2.  Right lower lobe atelectasis. Left upper lobe nodule measuring 3 mm.  3.  Findings regarding pulmonary embolus were discussed with medical team resident Dr. London by Dr. Putnam with read back at 3:57 PM on 8/24/2021.    ABDOMEN AND PELVIS:    1.  Thrombus within the infrarenal IVC extends into the left common iliac vein and left external iliac vein to the left common femoral vein.  2.  The findings are suspicious for left lower extremity venous thrombosis involving the imaged segments of the common femoral and superficial femoral vein. This was better shown on the recent ultrasound of the lower extremities.           Chief Complaint:  Patient is a 71y old  Male who presents with a chief complaint of 71M p/w Left lower extremity swelling (26 Aug 2021 11:42)      HPI: 71M with PMH of alcohol use disorder (recent hospitalization at [place] 2-3 days ago for alcohol withdrawal symptoms of seizures and delirium) presenting with left lower leg swelling and pain. Workup here revealed DVT and PE, s/p Iliocaval venous thrombosis mechanical thrombectomy by IR 8/25, currently on a heparin gtt. RUQ US showing hepatic steatosis vs early cirrhosis. CT A/P showing bilobar hypodense hepatic lesions- L lobe lesion 3.5 x 2.8 cm, R lobe 1.1 x 1.0 cm, caudate lobe 1.4 x 1.2 cm.  Hepatology consulted due to liver lesions.    Reports intermittent ETOH use, up to 3 shots of vodka in one occasion. Is unable to describe how often he drinks and states he can go weeks with any ETOH. Denies any h/o withdrawal seizures, withdrawal or LOC. States he drinks because of the stress in his life- states his wife is chronically ill and his son was diagnosed with ?metastatic colon cancer last year (at age 42) with ?mets to the spine (pt states there was bone cancer and liver lesions found but he is unable to clarify primary source). States he is tremulous and anxious at home and says he takes Ativan 4 mg which helps. When asked about his ETOH use, pt does not think he needs to cut back of quit stating "this is not an excessive amount." States his last colonoscopy was reportedly 10 years ago and was normal, states they did not find any polyps; is unable to recall where the c-scope was done.    Denies any weight loss, night sweats, easy bruising/bleeding, N/V/C/D, abdominal pain, melena, hematochezia.    Allergies:  No Known Allergies      Home Medications:    Hospital Medications:  folic acid 1 milliGRAM(s) Oral daily  heparin   Injectable 6500 Unit(s) IV Push every 6 hours PRN  heparin   Injectable 3000 Unit(s) IV Push every 6 hours PRN  heparin  Infusion.  Unit(s)/Hr IV Continuous <Continuous>  lactated ringers. 500 milliLiter(s) IV Continuous <Continuous>  LORazepam     Tablet 2 milliGRAM(s) Oral every 2 hours PRN  LORazepam   Injectable 2 milliGRAM(s) IV Push every 1 hour PRN  multivitamin 1 Tablet(s) Oral daily  thiamine IVPB 500 milliGRAM(s) IV Intermittent three times a day      PMHX/PSHX:  Alcohol abuse    History of alcohol use    No significant past surgical history        Family history:  son with ?metastatic colon cancer last year with mets to the spine (pt states there were bone and liver lesions found but he is unable to clarify primary source); son diagnosed at 42      Social History:  +ETOH (up to 3 shots of vodka in one occasion; is unable to describe how often he drinks and states he can go weeks with any ETOH), denies any current tobacco or illicit drug use; works as a  making [employer]      ROS: 14 point ROS negative unless otherwise stated in subjective      PHYSICAL EXAM:     GENERAL:  Appears stated age, chronically ill appearing, in mild distress  HEENT:  NC/AT,  conjunctivae clear and pink  CHEST:  Full & symmetric excursion, no increased effort, breath sounds clear anteriorly  HEART:  Regular rhythm, S1, S2, no murmur/rub/S3/S4  ABDOMEN:  Soft, non-tender, +mildly-distended, +bowel sounds  EXTREMITIES:  L>R LE edema (unable to grade edema 2/2 BL leg wraps); +BL LE are wrapped  SKIN:  warm, dry; no spider angiomas or palmar erythema  NEURO:  Alert, orientedx2 (person, knows he is in a hospital but unsure of hospital name, oriented to year but not month or day); +BL UE mildly tremulous but no asterixis  PSYCH: emotionally labile- at times agitated but also tearful    Vital Signs:  Vital Signs Last 24 Hrs  T(C): 36.8 (26 Aug 2021 11:00), Max: 36.9 (25 Aug 2021 16:26)  T(F): 98.2 (26 Aug 2021 11:00), Max: 98.4 (25 Aug 2021 16:26)  HR: 82 (26 Aug 2021 11:00) (76 - 97)  BP: 140/78 (26 Aug 2021 11:00) (111/56 - 145/79)  BP(mean): 96 (25 Aug 2021 22:30) (75 - 107)  RR: 18 (26 Aug 2021 05:12) (16 - 20)  SpO2: 96% (26 Aug 2021 11:00) (94% - 100%)  Daily Height in cm: 177.8 (25 Aug 2021 16:41)    Daily     LABS:                        14.3   8.12  )-----------( 145      ( 26 Aug 2021 07:08 )             40.6     08-26    133<L>  |  100  |  12  ----------------------------<  166<H>  4.6   |  21<L>  |  0.80    Ca    8.7      26 Aug 2021 07:06  Phos  3.7     08-26  Mg     2.1     08-26    TPro  5.9<L>  /  Alb  2.9<L>  /  TBili  0.9  /  DBili  x   /  AST  11  /  ALT  13  /  AlkPhos  51  08-26    LIVER FUNCTIONS - ( 26 Aug 2021 07:06 )  Alb: 2.9 g/dL / Pro: 5.9 g/dL / ALK PHOS: 51 U/L / ALT: 13 U/L / AST: 11 U/L / GGT: x           PT/INR - ( 25 Aug 2021 20:43 )   PT: 16.4 sec;   INR: 1.39 ratio         PTT - ( 26 Aug 2021 14:05 )  PTT:55.4 sec        Imaging:        EXAM:  US ABDOMEN RT UPR QUADRANT                            PROCEDURE DATE:  08/24/2021            INTERPRETATION:  CLINICAL INFORMATION: Alcohol use disorder. Evaluate for cirrhosis.    COMPARISON: None available.    TECHNIQUE: Sonography of the right upper quadrant.    FINDINGS:    Liver: Increase parenchymal echogenicity, compatible with steatosis. Perihepatic lobe measures 14.9 cm. Mild surface contour nodularity. No sonographic evidence of liver mass.  Bile ducts: Normal caliber. Common bile duct measures 4 mm.  Gallbladder: Biliary sludge. No gallstone. No wall thickening or pericholecystic fluid.  Pancreas: Increased parenchymal echogenicity, compatible with fatty replacement of pancreatic parenchyma.  Right kidney: 11.6 cm. Mild hydronephrosis. Interpolar and lower pole parapelvic cyst measuring 0.9 x 1.3 x 1.4 and 1.0 x 2.2 x 1.5 cm respectively.  Ascites: None.  IVC: Visualized portions are within normal limits.    IMPRESSION:  Steatotic liver with mild surface contour regularity suggestive of hepatic parenchymal disease versus early cirrhosis.  Mild right-sided hydronephrosis without identified cause.      INTERPRETATION:  CTA CHEST    INDICATION: Extensive DVT, evaluate for pulmonary embolus.    TECHNIQUE: Enhanced helical images were obtained of the chest. Coronal and sagittal images were reconstructed.  Images were obtained after the uneventful administration of 90 cc of nonionic intravenous contrast (Omnipaque 350). 10 cc of nonionic intravenous contrast (Omnipaque 350) was discarded. Maximum intensity projection images were generated.    COMPARISON: CTA heart with coronary stent 12/20/2011. Ultrasound 8/23/2021.    FINDINGS:    Pulmonary Artery:Pulmonary embolus in a right lobar pulmonary artery branch with extension into segmental and subsegmental branches. In particular, the emboli involve the posterior segmental artery of the right lower lobe. There is also a pulmonary embolus in the posterior division of the apical posterior segmental artery of the left upper lobe.    Tubes/Lines: None.    Lungs, airways and pleura: Within the posterior segment of the right lower lobe is a rhomboid shaped ground glass opacity. No pleural effusion. Left upper lobe is a 3 mm noncalcified nodule. Remainder of the lungs are clear. The airways and pleura are normal.    Mediastinum: The thyroid gland and esophagus are normal. The aorta is normal in caliber. Aortic calcifications. The heart is normal in size    ABDOMEN AND PELVIS:    Liver, gallbladder and biliary system: There are bilobar hypodense hepatic lesions. The lesions measure:  The left lobe lesion the subtle area of peripheral enhancement and measures 3.5 x 2.8 cm.  The right lobe lesionmeasures 1.1 x 1.0 cm.  There is an additional lesion in the caudate lobe of the liver. The lesion measures 1.4 x 1.2 cm.    The remainder of the liver is normal. There is no intrahepatic or extrahepatic biliary ductal dilatation    Pancreas: Within normal limits.    Spleen: Within normal limits.    Adrenal glands and kidneys: Bilateral parapelvic cysts. The kidneys are otherwise normal. No hydronephrosis. No hydroureter.    Lymph nodes: There are no enlarged retroperitoneal upper abdominal lymphnodes.    Bowel: The stomach is unremarkable. The large and small bowel are normal in caliber. The appendix is is not definitively visualized. There is no free air. There is no free fluid or free air.    Vascular: Atherosclerotic calcifications in the descending aorta. Aorta is normal in caliber. The major branches of the aorta are approximately patent.    The portal vein and hepatic veins are patent.    There is thrombus within the infrarenal IVC. There is a superior and inferior right renal vein. Single left renal vein. The thrombus is approximately 3 cm inferior to the inferior right renal vein.    The thrombus extends into the left common iliac vein and left external iliac vein to the left common femoral vein. The left common femoral veinis enlarged and centrally contains a area of low attenuation (series 5 image 142). This likely extends into the left superficial femoral vein and saphenous vein. In addition, there is subcutaneous stranding in the left lower extremity.    Bladder andreproductive system: The prostate gland is unremarkable. The bladder is unremarkable.    Bones And Soft Tissues: The bones are unremarkable.  In addition, there is subcutaneous stranding in the left lower extremity.      IMPRESSION:    CHEST:  1.  Bilateral pulmonary emboli.  2.  Right lower lobe atelectasis. Left upper lobe nodule measuring 3 mm.  3.  Findings regarding pulmonary embolus were discussed with medical team resident Dr. London by Dr. Putnam with read back at 3:57 PM on 8/24/2021.    ABDOMEN AND PELVIS:    1.  Thrombus within the infrarenal IVC extends into the left common iliac vein and left external iliac vein to the left common femoral vein.  2.  The findings are suspicious for left lower extremity venous thrombosis involving the imaged segments of the common femoral and superficial femoral vein. This was better shown on the recent ultrasound of the lower extremities.

## 2021-08-26 NOTE — PROGRESS NOTE ADULT - SUBJECTIVE AND OBJECTIVE BOX
*****************************************  Camila London, PGY1  Internal Medicine  Pager [phone]: [phone]  *****************************************      Patient is a 71y old  Male who presents with a chief complaint of 71M p/w Left lower extremity swelling (25 Aug 2021 11:29)found to have extensive DVT within the infrarenal IVC that extends into the left common iliac vein and left external iliac vein to the left common femoral vein. Also found to have B/L PE      INTERVAL HPI/OVERNIGHT EVENTS:       SUBJECTIVE :          MEDICATIONS  (STANDING):  folic acid 1 milliGRAM(s) Oral daily  heparin  Infusion.  Unit(s)/Hr (14 mL/Hr) IV Continuous <Continuous>  lactated ringers. 500 milliLiter(s) (60 mL/Hr) IV Continuous <Continuous>  multivitamin 1 Tablet(s) Oral daily  thiamine IVPB 500 milliGRAM(s) IV Intermittent three times a day    MEDICATIONS  (PRN):  heparin   Injectable 6500 Unit(s) IV Push every 6 hours PRN For aPTT less than 40  heparin   Injectable 3000 Unit(s) IV Push every 6 hours PRN For aPTT between 40 - 57  LORazepam     Tablet 2 milliGRAM(s) Oral every 2 hours PRN Symptom-triggered 2 point increase in CIWA-Ar  LORazepam   Injectable 2 milliGRAM(s) IV Push every 1 hour PRN Symptom-triggered: each CIWA -Ar score 8 or GREATER      CAPILLARY BLOOD GLUCOSE      POCT Blood Glucose.: 127 mg/dL (25 Aug 2021 13:13)    I&O's Summary    24 Aug 2021 07:01  -  25 Aug 2021 07:00  --------------------------------------------------------  IN: 240 mL / OUT: 575 mL / NET: -335 mL    25 Aug 2021 07:01  -  26 Aug 2021 06:35  --------------------------------------------------------  IN: 240 mL / OUT: 350 mL / NET: -110 mL        PHYSICAL EXAM:  Vital Signs Last 24 Hrs  T(C): 36.5 (26 Aug 2021 05:12), Max: 37.3 (25 Aug 2021 13:17)  T(F): 97.7 (26 Aug 2021 05:12), Max: 99.2 (25 Aug 2021 13:17)  HR: 76 (26 Aug 2021 05:12) (76 - 97)  BP: 131/74 (26 Aug 2021 05:12) (111/56 - 145/79)  BP(mean): 96 (25 Aug 2021 22:30) (75 - 107)  RR: 18 (26 Aug 2021 05:12) (16 - 20)  SpO2: 94% (26 Aug 2021 05:12) (94% - 100%)    CONSTITUTIONAL: NAD, well-groomed  EYES:  conjunctiva and sclera clear  ENMT: Moist oral mucosa  NECK: Supple, no palpable masses; no JVD  RESPIRATORY: Normal respiratory effort; lungs are clear to auscultation bilaterally  CARDIOVASCULAR: Regular rate and rhythm, normal S1 and S2, no murmur/rub/gallop; No lower extremity edema  ABDOMEN: Nontender to palpation, normoactive bowel sounds, no rebound/guarding  EXTREMITIES:  2+ DP, L lower leg warm, erythematous, swollen, tender to palp, + femoral pulses   SKIN: warm, dry, normal color, no rash or abnormal lesions  PSYCH: A+O to person, place, and time; affect appropriate    LABS:                        15.9   7.79  )-----------( 160      ( 25 Aug 2021 07:30 )             45.0     08-25    130<L>  |  95<L>  |  16  ----------------------------<  139<H>  3.9   |  21<L>  |  0.88    Ca    9.4      25 Aug 2021 07:30  Mg     2.1     08-25    TPro  6.5  /  Alb  3.4  /  TBili  0.8  /  DBili  x   /  AST  17  /  ALT  15  /  AlkPhos  57  08-25    PT/INR - ( 25 Aug 2021 20:43 )   PT: 16.4 sec;   INR: 1.39 ratio         PTT - ( 25 Aug 2021 20:43 )  PTT:>200.0 sec            RADIOLOGY & ADDITIONAL TESTS:  Results Reviewed:   Imaging Personally Reviewed:  Electrocardiogram Personally Reviewed:    COORDINATION OF CARE:  Care Discussed with Consultants/Other Providers [Y/N]:  Prior or Outpatient Records Reviewed [Y/N]:   *****************************************  Camila London, PGY1  Internal Medicine  Pager NS: [phone]  *****************************************      Patient is a 71y old  Male who presents with a chief complaint of 71M p/w Left lower extremity swelling (25 Aug 2021 11:29)found to have extensive DVT within the infrarenal IVC that extends into the left common iliac vein and left external iliac vein to the left common femoral vein. Also found to have B/L PE      INTERVAL HPI/OVERNIGHT EVENTS: NAEO      SUBJECTIVE : Pt seen and examined at bedside. Pt is POD # 1 from LLE-iliocaval DVT thrombectomy. Notes pain in legs have improved after the procedure.     Denies CP, SOB, fever/chills, dysuria, hematuria, diarrhea/constipation.         MEDICATIONS  (STANDING):  folic acid 1 milliGRAM(s) Oral daily  heparin  Infusion.  Unit(s)/Hr (14 mL/Hr) IV Continuous <Continuous>  lactated ringers. 500 milliLiter(s) (60 mL/Hr) IV Continuous <Continuous>  multivitamin 1 Tablet(s) Oral daily  thiamine IVPB 500 milliGRAM(s) IV Intermittent three times a day    MEDICATIONS  (PRN):  heparin   Injectable 6500 Unit(s) IV Push every 6 hours PRN For aPTT less than 40  heparin   Injectable 3000 Unit(s) IV Push every 6 hours PRN For aPTT between 40 - 57  LORazepam     Tablet 2 milliGRAM(s) Oral every 2 hours PRN Symptom-triggered 2 point increase in CIWA-Ar  LORazepam   Injectable 2 milliGRAM(s) IV Push every 1 hour PRN Symptom-triggered: each CIWA -Ar score 8 or GREATER      CAPILLARY BLOOD GLUCOSE      POCT Blood Glucose.: 127 mg/dL (25 Aug 2021 13:13)    I&O's Summary    24 Aug 2021 07:01  -  25 Aug 2021 07:00  --------------------------------------------------------  IN: 240 mL / OUT: 575 mL / NET: -335 mL    25 Aug 2021 07:01  -  26 Aug 2021 06:35  --------------------------------------------------------  IN: 240 mL / OUT: 350 mL / NET: -110 mL        PHYSICAL EXAM:  Vital Signs Last 24 Hrs  T(C): 36.5 (26 Aug 2021 05:12), Max: 37.3 (25 Aug 2021 13:17)  T(F): 97.7 (26 Aug 2021 05:12), Max: 99.2 (25 Aug 2021 13:17)  HR: 76 (26 Aug 2021 05:12) (76 - 97)  BP: 131/74 (26 Aug 2021 05:12) (111/56 - 145/79)  BP(mean): 96 (25 Aug 2021 22:30) (75 - 107)  RR: 18 (26 Aug 2021 05:12) (16 - 20)  SpO2: 94% (26 Aug 2021 05:12) (94% - 100%)    CONSTITUTIONAL: NAD, well-groomed  EYES:  conjunctiva and sclera clear  ENMT: Moist oral mucosa  NECK: Supple, no palpable masses; no JVD  RESPIRATORY: Normal respiratory effort; lungs are clear to auscultation bilaterally  CARDIOVASCULAR: Regular rate and rhythm, normal S1 and S2, no murmur/rub/gallop; No lower extremity edema  ABDOMEN: Nontender to palpation, normoactive bowel sounds, no rebound/guarding  EXTREMITIES:  2+ DP, L lower leg warm, erythematous, swollen, tender to palp, + femoral pulses   SKIN: warm, dry, normal color, no rash or abnormal lesions  PSYCH: A+O to person, place, and time; affect appropriate    LABS:                        15.9   7.79  )-----------( 160      ( 25 Aug 2021 07:30 )             45.0     08-25    130<L>  |  95<L>  |  16  ----------------------------<  139<H>  3.9   |  21<L>  |  0.88    Ca    9.4      25 Aug 2021 07:30  Mg     2.1     08-25    TPro  6.5  /  Alb  3.4  /  TBili  0.8  /  DBili  x   /  AST  17  /  ALT  15  /  AlkPhos  57  08-25    PT/INR - ( 25 Aug 2021 20:43 )   PT: 16.4 sec;   INR: 1.39 ratio         PTT - ( 25 Aug 2021 20:43 )  PTT:>200.0 sec            RADIOLOGY & ADDITIONAL TESTS:  Results Reviewed: Y  Imaging Personally Reviewed:  Electrocardiogram Personally Reviewed:    COORDINATION OF CARE:  Care Discussed with Consultants/Other Providers [Y/N]:  Prior or Outpatient Records Reviewed [Y/N]:

## 2021-08-26 NOTE — DISCHARGE NOTE PROVIDER - NSFOLLOWUPCLINICS_GEN_ALL_ED_FT
[facility]  Hematology/Oncology  [address] [id number]  Phone: [phone]  Fax:   Follow Up Time: 2 weeks

## 2021-08-26 NOTE — DISCHARGE NOTE PROVIDER - PROVIDER TOKENS
PROVIDER:[TOKEN:[[id number]],FOLLOWUP:[2 weeks]],PROVIDER:[TOKEN:[[id number]],FOLLOWUP:[2 weeks],ESTABLISHEDPATIENT:[T]] PROVIDER:[TOKEN:[[id number]],FOLLOWUP:[2 weeks]],PROVIDER:[TOKEN:[[id number]],FOLLOWUP:[2 weeks],ESTABLISHEDPATIENT:[T]],PROVIDER:[TOKEN:[[id number]],SCHEDULEDAPPT:[10/05/2021],SCHEDULEDAPPTTIME:[08:00 AM]]

## 2021-08-26 NOTE — DISCHARGE NOTE PROVIDER - NSDCFUADDINST_GEN_ALL_CORE_FT
Please take your Xorelto 15 two times a day with meals until 09/14/2021 after which you have to take xorelto 20 daily.  Please take your Xarelto 15 two times a day with meals until 09/16/2021 after which you have to take Xarelto 20 daily from 09/17/2021.

## 2021-08-26 NOTE — DISCHARGE NOTE PROVIDER - CARE PROVIDERS DIRECT ADDRESSES
,[email or web address]_Unknown [email or web address]_Unknown,[email or web address]

## 2021-08-26 NOTE — DISCHARGE NOTE PROVIDER - NSDCCPCAREPLAN_GEN_ALL_CORE_FT
PRINCIPAL DISCHARGE DIAGNOSIS  Diagnosis: DVT of lower limb, acute  Assessment and Plan of Treatment: You came into the hospital with pain in your legs and was found to have a huge clot in your leg. We started you on a blood thinner to help dissolve the clot. Further imaging of the veins revealed an extensive clot in your legs along with clots in both parts of the lungs. You underwent a procedure called thrombectomy where the thrombus was taken out and a repeat scan showed that the veins in your legs were clear.      SECONDARY DISCHARGE DIAGNOSES  Diagnosis: Alcohol dependence with withdrawal  Assessment and Plan of Treatment:      PRINCIPAL DISCHARGE DIAGNOSIS  Diagnosis: DVT of lower limb, acute  Assessment and Plan of Treatment: You came into the hospital with pain in your legs and was found to have a huge clot in your leg. We started you on a blood thinner to help dissolve the clot. Further imaging of the veins revealed an extensive clot in your legs along with clots in both parts of the lungs. You underwent a procedure called thrombectomy where the thrombus was taken out and a repeat scan showed that the veins in your legs were clear. You were found to have a significant decrease in your symptoms.      SECONDARY DISCHARGE DIAGNOSES  Diagnosis: Liver lesion  Assessment and Plan of Treatment: On the CT scan of the abdomen, there were lesions indentified in the liver. We did an MRI to fully assess the lesions which showed ---------    Diagnosis: Alcohol dependence with withdrawal  Assessment and Plan of Treatment: You have a history of alcohol use with complications from withdrawal. We monitored you for any signs of withdrawal and gave you medications for withdrawal. You had minimal symptoms of anxiety and tremors which improved with the medications.     PRINCIPAL DISCHARGE DIAGNOSIS  Diagnosis: DVT of lower limb, acute  Assessment and Plan of Treatment: You came into the hospital with pain in your legs and was found to have a huge clot in your leg. We started you on a blood thinner to help dissolve the clot. Further imaging of the veins revealed an extensive clot in your legs along with clots in both parts of the lungs. You underwent a procedure called thrombectomy where the thrombus was taken out and a repeat scan showed that the veins in your legs were clear. Your pain was significantly decreased after the clot was taken out and the swelling and redness have improved significantly.   We are not sure as to why you are developing these huge clots; please follow up with the hematologist to do further workup to assess the reason behind clot formation. Please follow up with the vascular cardiology team for close monitoring of the clots. Please follow up with the gastroenterologist to have a colonoscopy done to have age appropriate cancer screening.      SECONDARY DISCHARGE DIAGNOSES  Diagnosis: Alcohol dependence with withdrawal  Assessment and Plan of Treatment: You have a history of alcohol use with complications from withdrawal. We monitored you for any signs of withdrawal and gave you medications for withdrawal. You had minimal symptoms of anxiety and tremors which improved with the medications.    Diagnosis: Liver lesion  Assessment and Plan of Treatment: On the CT scan of the abdomen, there were lesions identified in the liver.  We did some workup to assess if the lesion was due to hepatitis infections or autoimmune hepatitis and the blood work was negative. We did an MRI to fully assess the lesions which showed clusters of blood-filled vessels. These are benign lesions and there is no further workup needed for it.

## 2021-08-26 NOTE — CONSULT NOTE ADULT - REASON FOR ADMISSION
71M p/w Left lower extremity swelling

## 2021-08-26 NOTE — OCCUPATIONAL THERAPY INITIAL EVALUATION ADULT - LIVES WITH, PROFILE
Pt reports independence with self care and functional mobility without AD PTA. Pt lives in [place] +spouse. Pt does not drive.

## 2021-08-26 NOTE — PROGRESS NOTE ADULT - ATTENDING COMMENTS
Large clot haul  Leg feels much better  Today, at 7pm, stop heparin gtt and give Xarelto 15mg BID    Zoe [phone]

## 2021-08-26 NOTE — DISCHARGE NOTE PROVIDER - NSDCFUADDAPPT_GEN_ALL_CORE_FT
Please follow up with your PCP for regular health maintenance.     Please follow with the GI doctors for the management of your liver lesions.  Please follow up with the Hematologist for further workup of your clots. This is very important as you had a huge clot in your legs that need to be assessed and followed up closely. We started the workup but as you wanted to go home, we want you to closely follow with the Hematology group. The [facility] institution will be contacting you regarding the date and time of the appointment.     You have an appointment with the Vascular Cardiologist (Dr. Purcell) on Oct. 5th at 8 AM.     Please follow up with your PCP for regular health maintenance.     Please follow with the GI doctor for colon cancer screening.  Please follow up with the Hematologist for further workup of your clots. This is very important as you had a huge clot in your legs that need to be assessed and followed up closely. We started the workup but as you wanted to go home, we want you to closely follow with the Hematology group. The [facility] will be contacting you regarding the date and time of the appointment.     You have an appointment with the Vascular Cardiologist (Dr. Purcell) on Oct. 5th at 8 AM.     Please follow up with your PCP for regular health maintenance.     Please follow with the GI doctor for colon cancer screening. Please follow up with Dr. Maradiaga regarding your new diagnosis of cirrhosis and for a hepatology follow up.

## 2021-08-26 NOTE — DISCHARGE NOTE PROVIDER - NSDCMRMEDTOKEN_GEN_ALL_CORE_FT
Multiple Vitamins oral tablet: 1 tab(s) orally once a day  polyethylene glycol 3350 oral powder for reconstitution: 17 gram(s) orally 2 times a day  rivaroxaban 15 mg oral tablet: 1 tab(s) orally 2 times a day (with meals)  senna oral tablet: 2 tab(s) orally once a day (at bedtime)   folic acid 1 mg oral tablet: 1 tab(s) orally once a day  LORazepam 1 mg oral tablet: 1 tab(s) orally once a day, As needed, Agitation  Multiple Vitamins oral tablet: 1 tab(s) orally once a day  polyethylene glycol 3350 oral powder for reconstitution: 17 gram(s) orally 2 times a day  rivaroxaban 15 mg oral tablet: 1 tab(s) orally 2 times a day (with meals). Please take until September 16th for your loading dose.   senna oral tablet: 2 tab(s) orally once a day (at bedtime)  Xarelto 20 mg oral tablet: 1 tab(s) orally once a day. Please start taking your Xarelto once a day starting September 17th.

## 2021-08-26 NOTE — PROGRESS NOTE ADULT - SUBJECTIVE AND OBJECTIVE BOX
Vascular Cardiology  Progress note       [email or web address]   OFFICE [phone]         INTERVAL HISTORY: S.P Successful Iliocaval venous thrombosis mechanical thrombectomy 8/25. Pt has improved movement, sensation and pulses of left LE.        Allergies    No Known Allergies    Intolerances    	    MEDICATIONS:  heparin   Injectable 6500 Unit(s) IV Push every 6 hours PRN  heparin   Injectable 3000 Unit(s) IV Push every 6 hours PRN  heparin  Infusion.  Unit(s)/Hr IV Continuous <Continuous>  LORazepam     Tablet 2 milliGRAM(s) Oral every 2 hours PRN  LORazepam   Injectable 2 milliGRAM(s) IV Push every 1 hour PRN  folic acid 1 milliGRAM(s) Oral daily  lactated ringers. 500 milliLiter(s) IV Continuous <Continuous>  multivitamin 1 Tablet(s) Oral daily  thiamine IVPB 500 milliGRAM(s) IV Intermittent three times a day    PAST MEDICAL & SURGICAL HISTORY:  History of alcohol use    No significant past surgical history          REVIEW OF SYSTEMS:  10 Point ROS negative unless otherwise stated     [ x] All others negative	  [ ] Unable to obtain    PHYSICAL EXAM:  T(C): 36.6 (08-26-21 @ 07:53), Max: 37.3 (08-25-21 @ 13:17)  HR: 76 (08-26-21 @ 07:53) (76 - 97)  BP: 137/76 (08-26-21 @ 07:53) (111/56 - 145/79)  RR: 18 (08-26-21 @ 05:12) (16 - 20)  SpO2: 96% (08-26-21 @ 07:53) (94% - 100%)  I&O's Summary    25 Aug 2021 07:01  -  26 Aug 2021 07:00  --------------------------------------------------------  IN: 240 mL / OUT: 1200 mL / NET: -960 mL        Appearance:  	  HEENT:   Normal oral mucosa, PERRL, EOMI	  Carotid: No bruit   Lymphatic: No lymphadenopathy  Cardiovascular:  S1/S2, No murmur  Respiratory: CTAB   Psychiatry:  AAO x 3  Gastrointestinal:  Soft, Non-tender, + BS	  Skin: No rashes, No ecchymoses, No cyanosis	  Neurologic:  Non-focal   Extremities:  improved warmth and swelling     Vascular Pulse Exam:  Right DP: [x]palpable []non-palpable []audible      Left DP :   [x]palpable []non-palpable []audible      LABS:	 	    CBC Full  -  ( 26 Aug 2021 07:08 )  WBC Count : 8.12 K/uL  Hemoglobin : 14.3 g/dL  Hematocrit : 40.6 %  Platelet Count - Automated : 145 K/uL  Mean Cell Volume : 99.5 fl  Mean Cell Hemoglobin : 35.0 pg  Mean Cell Hemoglobin Concentration : 35.2 gm/dL  Auto Neutrophil # : x  Auto Lymphocyte # : x  Auto Monocyte # : x  Auto Eosinophil # : x  Auto Basophil # : x  Auto Neutrophil % : x  Auto Lymphocyte % : x  Auto Monocyte % : x  Auto Eosinophil % : x  Auto Basophil % : x    08-26    133<L>  |  100  |  12  ----------------------------<  166<H>  4.6   |  21<L>  |  0.80  08-25    130<L>  |  95<L>  |  16  ----------------------------<  139<H>  3.9   |  21<L>  |  0.88    Ca    8.7      26 Aug 2021 07:06  Ca    9.4      25 Aug 2021 07:30  Phos  3.7     08-26  Mg     2.1     08-26  Mg     2.1     08-25    TPro  5.9<L>  /  Alb  2.9<L>  /  TBili  0.9  /  DBili  x   /  AST  11  /  ALT  13  /  AlkPhos  51  08-26  TPro  6.5  /  Alb  3.4  /  TBili  0.8  /  DBili  x   /  AST  17  /  ALT  15  /  AlkPhos  57  08-25

## 2021-08-26 NOTE — PROGRESS NOTE ADULT - SUBJECTIVE AND OBJECTIVE BOX
HPI:  71M with PMH of alcohol use disorder (recent hospitalization for alcohol withdrawal symptoms of seizures and delirium) presenting with left lower leg swelling and pain. Pt is a poor historian and states that he has "memory issues", recalls that he first noticed the swelling and pain 2-3 days ago, denies any falls or trauma. The pain was constant and made it difficult for him to walk. At baseline, he walks without cane or walker without limitations. In the ED, vascular imaging shown to have extensive LLE DVT and heparin gtt started. Denies fevers, chest pain, SOB.    Of note, patient was recently discharged from [place] 2-3 days ago, treated for alcohol withdrawal sx of seizures and delirium.     POD # 1 from LLE-iliocaval DVT thrombectomy.    Physical Exam:    Neuro: A & O x3  Abdomen: soft, nt, nd  LLE: significant interval decrease in degree of LLE swelling, no pain on palpation now. Motor function and sensation fully intact and symmetrical b/l, + DP, + CFA + PT.  Popliteal area c/d/i, no hematoma. Compression stocking on.  RLE: + CFA + DP + PT no swelling. Popliteal area c/d/i, no hematoma. Compression stocking on.    Allergies: No Known Allergies      PAST MEDICAL & SURGICAL HISTORY:  History of alcohol use    No significant past surgical history      Pertinent labs:                      14.3   8.12  )-----------( 145      ( 26 Aug 2021 07:08 )             40.6   08-26    133<L>  |  100  |  12  ----------------------------<  166<H>  4.6   |  21<L>  |  0.80    Ca    8.7      26 Aug 2021 07:06  Phos  3.7     08-26  Mg     2.1     08-26    TPro  5.9<L>  /  Alb  2.9<L>  /  TBili  0.9  /  DBili  x   /  AST  11  /  ALT  13  /  AlkPhos  51  08-26  PT/INR - ( 25 Aug 2021 20:43 )   PT: 16.4 sec;   INR: 1.39 ratio         PTT - ( 26 Aug 2021 07:08 )  PTT:62.8 sec    A/P:  - c/w bilateral LE compression stocking dressings daily  - c/w ambulation immediately and encourage frequent ambulation daily  - patient will need bridging to oral anticoagulation which he will need to be on for 6 months in this setting of an unprovoked DVT  - c/w Heparin drip using heparin normogram until patient is bridged to oral AC  - patient will need outpatient followup with Hematology

## 2021-08-26 NOTE — CONSULT NOTE ADULT - ASSESSMENT
71M with PMH of alcohol use disorder (recent hospitalization at [place] 2-3 days ago for alcohol withdrawal symptoms of seizures and delirium) presenting with left lower leg swelling and pain. Workup here revealed DVT and PE, s/p Iliocaval venous thrombosis mechanical thrombectomy by IR 8/25, currently on a heparin gtt. RUQ US showing hepatic steatosis vs early cirrhosis. CT A/P showing bilobar hypodense hepatic lesions- L lobe lesion 3.5 x 2.8 cm, R lobe 1.1 x 1.0 cm, caudate lobe 1.4 x 1.2 cm.  Hepatology consulted due to liver lesions.    Impression:      Recommendations:      Adia Soto MD  GI Fellow, PGY-4  Available via [employer] Teams    NON-URGENT CONSULTS:  Please email [email or web address]  AT NIGHT AND ON WEEKENDS:  Contact on-call GI fellow via answering service ([phone]) from 5pm-8am and on weekends/holidays  MONDAY-FRIDAY 8AM-5PM:  Pager# [phone] ([facility]) or [phone] ([facility])  GI Phone# [phone] ([facility])   71M with PMH of alcohol use disorder (recent hospitalization at [place] 2-3 days ago for alcohol withdrawal symptoms of seizures and delirium) presenting with left lower leg swelling and pain. Workup here revealed DVT and PE, s/p Iliocaval venous thrombosis mechanical thrombectomy by IR 8/25, currently on a heparin gtt. RUQ US showing hepatic steatosis vs early cirrhosis. CT A/P showing bilobar hypodense hepatic lesions- L lobe lesion 3.5 x 2.8 cm, R lobe 1.1 x 1.0 cm, caudate lobe 1.4 x 1.2 cm.  Hepatology consulted due to liver lesions.      Impression:  #bilobar hypodense hepatic lesions- L lobe lesion 3.5 x 2.8 cm, R lobe 1.1 x 1.0 cm, caudate lobe 1.4 x 1.2 cm; found incidentally on CT A/P this admission; unclear etiology; differential includes cysts vs hemangiomas vs metastasis of unknown origin as these can all present as hypodense liver lesions  #overdue to colon cancer screening with ?family hx of colon polyps in son- last colonoscopy was 10 years ago and reportedly normal  #ETOH use disorder-  intermittent ETOH use, up to 3 shots of vodka in one occasion; h/o withdrawal seizures, withdrawal or LOC; still tremulous on exam      **THIS IS AN INCOMPLETE NOTE; PLEASE AWAIT FINALIZED NOTE FOR RECS**      Recommendations:  - may want to consider further workup with MRI abdomen w wo cont and possible IR consult for liver bx  - consider ordering AFP, CEA, CA 19-9  - check HBV SAg, cAb, SAb; HCV Ab  - consider repeat colonoscopy while inpatient  - rest of care per primary team    We will continue to follow.    Adia Soto MD  GI Fellow, PGY-4  Available via [employer] Teams    NON-URGENT CONSULTS:  Please email [email or web address]  AT NIGHT AND ON WEEKENDS:  Contact on-call GI fellow via answering service ([phone]) from 5pm-8am and on weekends/holidays  MONDAY-FRIDAY 8AM-5PM:  Pager# [phone] ([facility]) or [phone] ([facility])  GI Phone# [phone] ([facility])   71M with PMH of alcohol use disorder (recent hospitalization at [place] 2-3 days ago for alcohol withdrawal symptoms of seizures and delirium) presenting with left lower leg swelling and pain. Workup here revealed DVT and PE, s/p Iliocaval venous thrombosis mechanical thrombectomy by IR 8/25, currently on a heparin gtt. RUQ US showing hepatic steatosis vs early cirrhosis. CT A/P showing bilobar hypodense hepatic lesions- L lobe lesion 3.5 x 2.8 cm, R lobe 1.1 x 1.0 cm, caudate lobe 1.4 x 1.2 cm.  Hepatology consulted due to liver lesions.      Impression:  #bilobar hypodense hepatic lesions- L lobe lesion 3.5 x 2.8 cm, R lobe 1.1 x 1.0 cm, caudate lobe 1.4 x 1.2 cm; found incidentally on CT A/P this admission; unclear etiology; differential includes cysts vs hemangiomas vs metastasis of unknown origin as these can all present as hypodense liver lesions  #overdue to colon cancer screening with ?family hx of colon polyps in son- last colonoscopy was 10 years ago and reportedly normal  #ETOH use disorder-  intermittent ETOH use, up to 3 shots of vodka in one occasion; h/o withdrawal seizures, withdrawal or LOC; still tremulous on exam        Recommendations:  - will need further workup with MRI abdomen w wo cont   - will consider possible IR consult for liver bx pending MRI abdomen results  - order AFP, CEA, CA 19-9  - check HBV SAg, cAb, SAb; HCV Ab  - consider repeat colonoscopy while inpatient pending MRI abdomen results  - rest of care per primary team    We will continue to follow.    Adia Soto MD  GI Fellow, PGY-4  Available via [employer] Teams    NON-URGENT CONSULTS:  Please email [email or web address]  AT NIGHT AND ON WEEKENDS:  Contact on-call GI fellow via answering service ([phone]) from 5pm-8am and on weekends/holidays  MONDAY-FRIDAY 8AM-5PM:  Pager# [phone] ([facility]) or [phone] ([facility])  GI Phone# [phone] ([facility])

## 2021-08-26 NOTE — OCCUPATIONAL THERAPY INITIAL EVALUATION ADULT - PERTINENT HX OF CURRENT PROBLEM, REHAB EVAL
71M with PMH of alcohol use disorder (recent hospitalization for alcohol withdrawal symptoms of seizures and delirium) presenting with left lower leg swelling and pain. Pt is a poor historian and states that he has "memory issues", recalls that he first noticed the swelling and pain 2-3 days ago, denies any falls or trauma

## 2021-08-26 NOTE — OCCUPATIONAL THERAPY INITIAL EVALUATION ADULT - PLANNED THERAPY INTERVENTIONS, OT EVAL
ADL retraining/balance training/bed mobility training/cognitive, visual perceptual/motor coordination training/strengthening/transfer training

## 2021-08-26 NOTE — OCCUPATIONAL THERAPY INITIAL EVALUATION ADULT - DIAGNOSIS, OT EVAL
Pt p/w impaired balance, strength, endurance, coordination, cognition, safety awareness impacting ind with ADLs and fxnl mobility.

## 2021-08-26 NOTE — PROGRESS NOTE ADULT - PROBLEM SELECTOR PLAN 4
Total bili 1.5, indirect bili 1.1, thrombocytopenia 97K, Hg 17  -Trend bili  -F/u hemolysis labs (LDH, Retic, haptoglobin) Total bili 1.5, indirect bili 1.1, thrombocytopenia 97K, Hg 17    Plan:   -Trend bili

## 2021-08-26 NOTE — OCCUPATIONAL THERAPY INITIAL EVALUATION ADULT - PRECAUTIONS/LIMITATIONS, REHAB EVAL
The pain was constant and made it difficult for him to walk. At baseline, he walks without cane or walker without limitations. In the ED, vascular imaging shown to have extensive LLE DVT and heparin gtt started. Denies fevers, chest pain, SOB./fall precautions

## 2021-08-26 NOTE — OCCUPATIONAL THERAPY INITIAL EVALUATION ADULT - ADDITIONAL COMMENTS
Of note, patient was recently discharged from [place] 2-3 days ago, treated for alcohol withdrawal sx of seizures and delirium. Per patient, first time being hospitalized and per chart has not been hospitalized previously for alcohol withdrawal in [facility]. Pt states that his last drink was five days ago, but in the ED stated it was one day ago and to RN stated 20 days ago. Reports alcohol use 3x a week, 2-3 shots of hard liquor in one occasion. s/p successful Iliocaval venous thrombosis mechanical thrombectomy 8/25.

## 2021-08-26 NOTE — PROGRESS NOTE ADULT - PROBLEM SELECTOR PLAN 3
Recent hospitalization for alcohol withdrawal symptoms of seizures and delirium, currently no symptoms or signs of DT  CIWA: 1  US: early cirrhosis     Plan:   -High risk CIWA, sx triggered  -Ativan 2mg PO PRN if CIWA >2  -Ativan 4mg IV q1 hours if CIWA >8  -Thiamine 100mg for three days  -Folate and multivitamin daily  -F/u B12 and folate levels  -f/u hepatitis panel, autoimmune hepatitis panel Recent hospitalization for alcohol withdrawal symptoms of seizures and delirium, currently no symptoms or signs of DT  CIWA: 1  US: early cirrhosis   B12, folate wnl  CT Abd and Pelvis: There are bilobar hypodense hepatic lesions.   The left lobe lesion the subtle area of peripheral enhancement and measures 3.5 x 2.8 cm.  The right lobe lesion measures 1.1 x 1.0 cm.  There is an additional lesion in the caudate lobe of the liver. The lesion measures 1.4 x 1.2 cm.    Plan:   -High risk CIWA, sx triggered  -Ativan 2mg PO PRN if CIWA >2  -Ativan 4mg IV q1 hours if CIWA >8  -Thiamine 100mg for three days  -Folate and multivitamin daily  -CT triple phase to evaluate the lesions

## 2021-08-26 NOTE — DISCHARGE NOTE PROVIDER - NSDCCPTREATMENT_GEN_ALL_CORE_FT
PRINCIPAL PROCEDURE  Procedure: Abdomen CT  Findings and Treatment:        PRINCIPAL PROCEDURE  Procedure: Abdomen CT  Findings and Treatment: Liver, gallbladder and biliary system: There are bilobar hypodense hepatic lesions. The lesions measure:  The left lobe lesion the subtle area of peripheral enhancement and measures 3.5 x 2.8 cm.  The right lobe lesion measures 1.1 x 1.0 cm.  There is an additional lesion in the caudate lobe of the liver. The lesion measures 1.4 x 1.2 cm.  The remainder of the liver is normal. There is no intrahepatic or extrahepatic biliary ductal dilatation  IMPRESSION:  CHEST:  1.  Bilateral pulmonary emboli.  2.  Right lower lobe atelectasis. Left upper lobe nodule measuring 3 mm.  3.  Findings regarding pulmonary embolus were discussed with medical team resident Dr. London by Dr. Putnam with read back at 3:57 PM on 8/24/2021.  ABDOMEN AND PELVIS:  1.  Thrombus within the infrarenal IVC extends into the left common iliac vein and left external iliac vein to the left common femoral vein.  2.  The findings are suspicious for left lower extremity venous thrombosis involving the imaged segments of the common femoral and superficial femoral vein. This was better shown on the recent ultrasound of the lower extremities.      SECONDARY PROCEDURE  Procedure: CT head wo contrast  Findings and Treatment: FINDINGS:  There is no evidence of acute infarction, intracranial hemorrhage or mass lesion.  There is hypoattenuation of the subcortical and periventricular white matter, which is nonspecific finding, but most likely represents sequela of chronic microvascular ischemic disease. There are atherosclerotic calcifications of the cavernous internal carotid arteries bilaterally. There is prominence of the cortical sulci related to underlying brain parenchymal volume loss.  There is no evidence of hydrocephalus. There are no extra-axial fluid collections.  The visualized intraorbital contents are normal. The imaged portions of the paranasal sinuses are aerated. The mastoid air cells are clear. The visualized soft tissues and osseous structures appear unremarkable.

## 2021-08-26 NOTE — PROGRESS NOTE ADULT - PROBLEM SELECTOR PLAN 1
Acute lower leg pain and swelling with warmth, extensive thrombus on venous US.  Palpable DP pulses, warm, perfused, low concern for limb ischemia  Most likely provoked DVT i/s/o immobility from recent hospitalization  CT Venogram Abd + Pelvis: Thrombus within the infrarenal IVC extends into the left common iliac vein and left external iliac vein to the left common femoral vein.  CT Chest: Bilateral pulmonary emboli.    Plan  -c/w Heparin gtt, long term AC for 6 months  -Outpatient heme follow up  -IR planning LLE thrombectomy/ thrombolysis 8/25 Acute lower leg pain and swelling with warmth, extensive thrombus on venous US.  Palpable DP pulses, warm, perfused, low concern for limb ischemia  Most likely provoked DVT i/s/o immobility from recent hospitalization  CT Venogram Abd + Pelvis: Thrombus within the infrarenal IVC extends into the left common iliac vein and left external iliac vein to the left common femoral vein.  CT Chest: Bilateral pulmonary emboli.  POD # 1 from LLE-iliocaval DVT thrombectomy.    Plan  -c/w Heparin gtt, long term AC for 6 months  -Outpatient heme follow up  -IR planning LLE thrombectomy/ thrombolysis 8/25

## 2021-08-26 NOTE — PHYSICAL THERAPY INITIAL EVALUATION ADULT - PERTINENT HX OF CURRENT PROBLEM, REHAB EVAL
72 y/o M w/ PMH of alcohol use disorder (recent hospitalization for alcohol withdrawal symptoms of seizures and delirium) presenting with left lower leg swelling and pain, found to have extensive L lower leg DVT most likely i/s/o immobilization from recent hospitalization, pt is s/p IR thrombectomy/thrombolysis on 8/25. Pt also w/ b/l PE in CTA chest, anticoagulated.

## 2021-08-27 DIAGNOSIS — K76.9 LIVER DISEASE, UNSPECIFIED: ICD-10-CM

## 2021-08-27 LAB
A1AT SERPL-MCNC: 246 MG/DL — HIGH (ref 90–200)
ALBUMIN SERPL ELPH-MCNC: 2.9 G/DL — LOW (ref 3.3–5)
ALP SERPL-CCNC: 48 U/L — SIGNIFICANT CHANGE UP (ref 40–120)
ALT FLD-CCNC: 14 U/L — SIGNIFICANT CHANGE UP (ref 10–45)
ANION GAP SERPL CALC-SCNC: 12 MMOL/L — SIGNIFICANT CHANGE UP (ref 5–17)
AST SERPL-CCNC: 14 U/L — SIGNIFICANT CHANGE UP (ref 10–40)
BILIRUB DIRECT SERPL-MCNC: 0.2 MG/DL — SIGNIFICANT CHANGE UP (ref 0–0.2)
BILIRUB INDIRECT FLD-MCNC: 0.4 MG/DL — SIGNIFICANT CHANGE UP (ref 0.2–1)
BILIRUB SERPL-MCNC: 0.6 MG/DL — SIGNIFICANT CHANGE UP (ref 0.2–1.2)
BILIRUB SERPL-MCNC: 0.6 MG/DL — SIGNIFICANT CHANGE UP (ref 0.2–1.2)
BUN SERPL-MCNC: 11 MG/DL — SIGNIFICANT CHANGE UP (ref 7–23)
CALCIUM SERPL-MCNC: 8.6 MG/DL — SIGNIFICANT CHANGE UP (ref 8.4–10.5)
CERULOPLASMIN SERPL-MCNC: 36 MG/DL — HIGH (ref 15–30)
CHLORIDE SERPL-SCNC: 101 MMOL/L — SIGNIFICANT CHANGE UP (ref 96–108)
CO2 SERPL-SCNC: 22 MMOL/L — SIGNIFICANT CHANGE UP (ref 22–31)
CREAT SERPL-MCNC: 0.77 MG/DL — SIGNIFICANT CHANGE UP (ref 0.5–1.3)
GLUCOSE SERPL-MCNC: 117 MG/DL — HIGH (ref 70–99)
HAV IGM SER-ACNC: SIGNIFICANT CHANGE UP
HBV CORE AB SER-ACNC: SIGNIFICANT CHANGE UP
HBV CORE IGM SER-ACNC: SIGNIFICANT CHANGE UP
HBV SURFACE AB SER-ACNC: SIGNIFICANT CHANGE UP
HBV SURFACE AG SER-ACNC: SIGNIFICANT CHANGE UP
HBV SURFACE AG SER-ACNC: SIGNIFICANT CHANGE UP
HCT VFR BLD CALC: 38.6 % — LOW (ref 39–50)
HCV AB S/CO SERPL IA: 0.07 S/CO — SIGNIFICANT CHANGE UP (ref 0–0.99)
HCV AB SERPL-IMP: SIGNIFICANT CHANGE UP
HGB BLD-MCNC: 13.6 G/DL — SIGNIFICANT CHANGE UP (ref 13–17)
INR BLD: 1.23 RATIO — HIGH (ref 0.88–1.16)
MAGNESIUM SERPL-MCNC: 2.1 MG/DL — SIGNIFICANT CHANGE UP (ref 1.6–2.6)
MCHC RBC-ENTMCNC: 34.7 PG — HIGH (ref 27–34)
MCHC RBC-ENTMCNC: 35.2 GM/DL — SIGNIFICANT CHANGE UP (ref 32–36)
MCV RBC AUTO: 98.5 FL — SIGNIFICANT CHANGE UP (ref 80–100)
NRBC # BLD: 0 /100 WBCS — SIGNIFICANT CHANGE UP (ref 0–0)
OSMOLALITY SERPL: 280 MOSMOL/KG — SIGNIFICANT CHANGE UP (ref 280–301)
PHOSPHATE SERPL-MCNC: 2.5 MG/DL — SIGNIFICANT CHANGE UP (ref 2.5–4.5)
PLATELET # BLD AUTO: 175 K/UL — SIGNIFICANT CHANGE UP (ref 150–400)
POTASSIUM SERPL-MCNC: 3.7 MMOL/L — SIGNIFICANT CHANGE UP (ref 3.5–5.3)
POTASSIUM SERPL-SCNC: 3.7 MMOL/L — SIGNIFICANT CHANGE UP (ref 3.5–5.3)
PROT SERPL-MCNC: 5.8 G/DL — LOW (ref 6–8.3)
PROTHROM AB SERPL-ACNC: 14.6 SEC — HIGH (ref 10.6–13.6)
RBC # BLD: 3.92 M/UL — LOW (ref 4.2–5.8)
RBC # FLD: 12 % — SIGNIFICANT CHANGE UP (ref 10.3–14.5)
SODIUM SERPL-SCNC: 135 MMOL/L — SIGNIFICANT CHANGE UP (ref 135–145)
SODIUM UR-SCNC: 39 MMOL/L — SIGNIFICANT CHANGE UP
WBC # BLD: 6.24 K/UL — SIGNIFICANT CHANGE UP (ref 3.8–10.5)
WBC # FLD AUTO: 6.24 K/UL — SIGNIFICANT CHANGE UP (ref 3.8–10.5)

## 2021-08-27 PROCEDURE — 99232 SBSQ HOSP IP/OBS MODERATE 35: CPT

## 2021-08-27 PROCEDURE — 99232 SBSQ HOSP IP/OBS MODERATE 35: CPT | Mod: GC

## 2021-08-27 RX ORDER — SENNA PLUS 8.6 MG/1
2 TABLET ORAL AT BEDTIME
Refills: 0 | Status: DISCONTINUED | OUTPATIENT
Start: 2021-08-27 | End: 2021-09-01

## 2021-08-27 RX ORDER — POLYETHYLENE GLYCOL 3350 17 G/17G
17 POWDER, FOR SOLUTION ORAL
Refills: 0 | Status: DISCONTINUED | OUTPATIENT
Start: 2021-08-27 | End: 2021-09-01

## 2021-08-27 RX ORDER — ALPRAZOLAM 0.25 MG
0.5 TABLET ORAL DAILY
Refills: 0 | Status: DISCONTINUED | OUTPATIENT
Start: 2021-08-27 | End: 2021-08-28

## 2021-08-27 RX ADMIN — Medication 1 TABLET(S): at 12:15

## 2021-08-27 RX ADMIN — SENNA PLUS 2 TABLET(S): 8.6 TABLET ORAL at 22:34

## 2021-08-27 RX ADMIN — Medication 1 MILLIGRAM(S): at 12:15

## 2021-08-27 RX ADMIN — Medication 105 MILLIGRAM(S): at 05:41

## 2021-08-27 RX ADMIN — Medication 105 MILLIGRAM(S): at 13:46

## 2021-08-27 RX ADMIN — RIVAROXABAN 15 MILLIGRAM(S): KIT at 22:34

## 2021-08-27 RX ADMIN — POLYETHYLENE GLYCOL 3350 17 GRAM(S): 17 POWDER, FOR SOLUTION ORAL at 18:39

## 2021-08-27 RX ADMIN — RIVAROXABAN 15 MILLIGRAM(S): KIT at 09:38

## 2021-08-27 RX ADMIN — Medication 0.5 MILLIGRAM(S): at 15:05

## 2021-08-27 RX ADMIN — Medication 2 MILLIGRAM(S): at 09:38

## 2021-08-27 RX ADMIN — Medication 105 MILLIGRAM(S): at 22:21

## 2021-08-27 NOTE — PROGRESS NOTE ADULT - PROBLEM SELECTOR PLAN 4
Total bili 1.5, indirect bili 1.1, thrombocytopenia 97K, Hg 17    Plan:   -Trend bili CT Abd and Pelvis: There are bilobar hypodense hepatic lesions.   The left lobe lesion the subtle area of peripheral enhancement and measures 3.5 x 2.8 cm.  The right lobe lesion measures 1.1 x 1.0 cm.  There is an additional lesion in the caudate lobe of the liver. The lesion measures 1.4 x 1.2 cm.    Plan:   - Hepatology following apprec recs  - F/u AFP, CEA, CA 19-9, anti-mitochondrial Ab

## 2021-08-27 NOTE — PROGRESS NOTE ADULT - ASSESSMENT
#Extensive LLE DVT consistent with PHLEGMASIA s/p successful thrombectomy 8/25  -symptomatic improvement     #Bilateral pulmonary embolism       Plan   s/p Successful thrombectomy w IR   Cont  Xarelto 15 mg BID with meals x 21 days, then transition to 20 mg QD after 21 days   Encourage ambulation   Work-up of hepatic lesions per primary team and hepatology   No further inpatient vascular workup, discharge planning when appropriate. Please schedule follow up appt with Vascular Cardiology Dr. Enriquez in 2-4 weeks upon hospital discharge     Tor Seymour MD  Cardiology Fellow  [phone]    Please check [email or web address]: [email or web address] for cardiology service schedule and contact information.

## 2021-08-27 NOTE — PROGRESS NOTE ADULT - ASSESSMENT
71M with PMH of alcohol use disorder (recent hospitalization at [place] 2-3 days ago for alcohol withdrawal symptoms of seizures and delirium) presenting with left lower leg swelling and pain. Workup here revealed DVT and PE, s/p Iliocaval venous thrombosis mechanical thrombectomy by IR 8/25, currently on a heparin gtt. RUQ US showing hepatic steatosis vs early cirrhosis. CT A/P showing bilobar hypodense hepatic lesions- L lobe lesion 3.5 x 2.8 cm, R lobe 1.1 x 1.0 cm, caudate lobe 1.4 x 1.2 cm.  Hepatology consulted due to liver lesions.      Impression:  #bilobar hypodense hepatic lesions- L lobe lesion 3.5 x 2.8 cm, R lobe 1.1 x 1.0 cm, caudate lobe 1.4 x 1.2 cm; found incidentally on CT A/P this admission; unclear etiology; differential includes cysts vs hemangiomas vs metastasis of unknown origin as these can all present as hypodense liver lesions  #overdue to colon cancer screening with ?family hx of colon polyps in son- last colonoscopy was 10 years ago and reportedly normal  #ETOH use disorder-  intermittent ETOH use, up to 3 shots of vodka in one occasion; h/o withdrawal seizures, withdrawal or LOC; still tremulous on exam      **THIS IS AN INCOMPLETE NOTE; PLEASE AWAIT FINALIZED NOTE FOR RECS**      Recommendations:  - may want to consider further workup with MRI abdomen w wo cont and possible IR consult for liver bx  - consider ordering AFP, CEA, CA 19-9  - check HBV SAg, cAb, SAb; HCV Ab  - consider repeat colonoscopy while inpatient  - rest of care per primary team    We will continue to follow.    Adia Soto MD  GI Fellow, PGY-4  Available via [employer] Teams    NON-URGENT CONSULTS:  Please email [email or web address]  [email or web address]  AT NIGHT AND ON WEEKENDS:  Contact on-call GI fellow via answering service ([phone]) from 5pm-8am and on weekends/holidays  MONDAY-FRIDAY 8AM-5PM:  Pager# [phone] ([facility]) or [phone] ([facility])  GI Phone# [phone] ([facility]) 71M with PMH of alcohol use disorder (recent hospitalization at [place] 2-3 days ago for alcohol withdrawal symptoms of seizures and delirium) presenting with left lower leg swelling and pain. Workup here revealed DVT and PE, s/p Iliocaval venous thrombosis mechanical thrombectomy by IR 8/25, currently on a heparin gtt. RUQ US showing hepatic steatosis vs early cirrhosis. CT A/P showing bilobar hypodense hepatic lesions- L lobe lesion 3.5 x 2.8 cm, R lobe 1.1 x 1.0 cm, caudate lobe 1.4 x 1.2 cm.  Hepatology consulted due to liver lesions.      Impression:  #bilobar hypodense hepatic lesions- L lobe lesion 3.5 x 2.8 cm, R lobe 1.1 x 1.0 cm, caudate lobe 1.4 x 1.2 cm; found incidentally on CT A/P this admission; unclear etiology; differential includes cysts vs hemangiomas vs metastasis of unknown origin as these can all present as hypodense liver lesions  #overdue to colon cancer screening with ?family hx of colon polyps in son- last colonoscopy was 10 years ago and reportedly normal  #ETOH use disorder-  intermittent ETOH use, up to 3 shots of vodka in one occasion; h/o withdrawal seizures, withdrawal or LOC      Recommendations:  - may want to consider further workup with MRI abdomen w wo cont   - will consider possible IR consult for liver bx pending MRI abdomen results  - order AFP, CEA, CA 19-9  - check HBV SAg, cAb, SAb; HCV Ab  - consider repeat colonoscopy while inpatient pending MRI abdomen results  - rest of care per primary team    We will continue to follow.    Adia Soto MD  GI Fellow, PGY-4  Available via [employer] Teams    NON-URGENT CONSULTS:  Please email [email or web address]  AT NIGHT AND ON WEEKENDS:  Contact on-call GI fellow via answering service ([phone]) from 5pm-8am and on weekends/holidays  MONDAY-FRIDAY 8AM-5PM:  Pager# [phone] ([facility]) or [phone] ([facility])  GI Phone# [phone] ([facility]) 71M with PMH of alcohol use disorder (recent hospitalization at [place] 2-3 days ago for alcohol withdrawal symptoms of seizures and delirium) presenting with left lower leg swelling and pain. Workup here revealed DVT and PE, s/p Iliocaval venous thrombosis mechanical thrombectomy by IR 8/25, currently on a heparin gtt. RUQ US showing hepatic steatosis vs early cirrhosis. CT A/P showing bilobar hypodense hepatic lesions- L lobe lesion 3.5 x 2.8 cm, R lobe 1.1 x 1.0 cm, caudate lobe 1.4 x 1.2 cm.  Hepatology consulted due to liver lesions.      Impression:  #bilobar hypodense hepatic lesions- L lobe lesion 3.5 x 2.8 cm, R lobe 1.1 x 1.0 cm, caudate lobe 1.4 x 1.2 cm; found incidentally on CT A/P this admission; unclear etiology; differential includes cysts vs hemangiomas vs metastasis of unknown origin as these can all present as hypodense liver lesions  #overdue to colon cancer screening with ?family hx of colon polyps in son- last colonoscopy was 10 years ago and reportedly normal  #ETOH use disorder-  intermittent ETOH use, up to 3 shots of vodka in one occasion; h/o withdrawal seizures, withdrawal or LOC      Recommendations:  - will need further workup with MRI abdomen w wo cont   - will consider possible IR consult for liver bx pending MRI abdomen results  - order AFP, CEA, CA 19-9  - check HBV SAg, cAb, SAb; HCV Ab  - consider repeat colonoscopy while inpatient pending MRI abdomen results  - rest of care per primary team    We will continue to follow.    Adia Soto MD  GI Fellow, PGY-4  Available via [employer] Teams    NON-URGENT CONSULTS:  Please email [email or web address]  AT NIGHT AND ON WEEKENDS:  Contact on-call GI fellow via answering service ([phone]) from 5pm-8am and on weekends/holidays  MONDAY-FRIDAY 8AM-5PM:  Pager# [phone] ([facility]) or [phone] ([facility])  GI Phone# [phone] ([facility]) 71M with PMH of alcohol use disorder (recent hospitalization at [place] 2-3 days ago for alcohol withdrawal symptoms of seizures and delirium) presenting with left lower leg swelling and pain. Workup here revealed DVT and PE, s/p Iliocaval venous thrombosis mechanical thrombectomy by IR 8/25, currently on a heparin gtt. RUQ US showing hepatic steatosis vs early cirrhosis. CT A/P showing bilobar hypodense hepatic lesions- L lobe lesion 3.5 x 2.8 cm, R lobe 1.1 x 1.0 cm, caudate lobe 1.4 x 1.2 cm.  Hepatology consulted due to liver lesions.      Impression:  #bilobar hypodense hepatic lesions- L lobe lesion 3.5 x 2.8 cm, R lobe 1.1 x 1.0 cm, caudate lobe 1.4 x 1.2 cm; found incidentally on CT A/P this admission; unclear etiology; differential includes cysts vs hemangiomas vs metastasis of unknown origin as these can all present as hypodense liver lesions. There is concern that this may be malignancy in the setting of recently diagnosed VTEs and family h/o of malignancy in his son.  #overdue to colon cancer screening with ?family hx of colon polyps in son- last colonoscopy was 10 years ago and reportedly normal  #ETOH use disorder-  intermittent ETOH use, up to 3 shots of vodka in one occasion; h/o withdrawal seizures, withdrawal or LOC      Recommendations:  - will need further workup with MRI abdomen w wo cont   - will consider possible IR consult for liver bx pending MRI abdomen results  - order AFP, CEA, CA 19-9  - check HBV SAg, cAb, SAb; HCV Ab  - consider repeat colonoscopy while inpatient pending MRI abdomen results  - rest of care per primary team    We will continue to follow.    Adia Soto MD  GI Fellow, PGY-4  Available via [employer] Teams    NON-URGENT CONSULTS:  Please email [email or web address]  AT NIGHT AND ON WEEKENDS:  Contact on-call GI fellow via answering service ([phone]) from 5pm-8am and on weekends/holidays  MONDAY-FRIDAY 8AM-5PM:  Pager# [phone] ([facility]) or [phone] ([facility])  GI Phone# [phone] ([facility])

## 2021-08-27 NOTE — PROGRESS NOTE ADULT - PROBLEM SELECTOR PLAN 3
Recent hospitalization for alcohol withdrawal symptoms of seizures and delirium, currently no symptoms or signs of DT  CIWA: 1  US: early cirrhosis   B12, folate wnl  CT Abd and Pelvis: There are bilobar hypodense hepatic lesions.   The left lobe lesion the subtle area of peripheral enhancement and measures 3.5 x 2.8 cm.  The right lobe lesion measures 1.1 x 1.0 cm.  There is an additional lesion in the caudate lobe of the liver. The lesion measures 1.4 x 1.2 cm.    Plan:   -High risk CIWA, sx triggered  -Ativan 2mg PO PRN if CIWA >2  -Ativan 4mg IV q1 hours if CIWA >8  -Thiamine 100mg for three days  -Folate and multivitamin daily  -CT triple phase to evaluate the lesions Recent hospitalization for alcohol withdrawal symptoms of seizures and delirium, currently no symptoms or signs of DT  US: early cirrhosis   B12, folate wnl  CT Abd and Pelvis: There are bilobar hypodense hepatic lesions.   The left lobe lesion the subtle area of peripheral enhancement and measures 3.5 x 2.8 cm.  The right lobe lesion measures 1.1 x 1.0 cm.  There is an additional lesion in the caudate lobe of the liver. The lesion measures 1.4 x 1.2 cm.  CIWA scores; 1-2    Plan:   -c/w thiamine  -Folate and multivitamin daily  -home xanax for anxiety  - Hepatology following

## 2021-08-27 NOTE — PROGRESS NOTE ADULT - ATTENDING COMMENTS
72 yo M with AUD with imaging evidence of newly diagnosed cirrhosis presumed secondary to ALD (given nodular liver with steatosis), admitted due to acute extensive LLE DVT with thrombus within infrarenal IVC extending into L common iliac vein, L external iliac vein, and L common femoral vein now s/p thrombectomy (8/25) and with bilateral (R>L) PEs (now on anticoagulation with Xarelto), also found to have hypodense hepatic lesions on CT with a 3.5 cm left hepatic lobe lesion, 1.1 cm right hepatic lobe lesion, and 1.4 cm caudate lesion. Differential diagnosis includes metastases, primary liver cancer (HCC vs cholangiocarcinoma), or benign lesions. Recommend MRI abdomen with and without contrast (liver protocol) for further evaluation and will also follow-up results of AFP, CA 19-9, and CEA. If the MRI appearance of the lesions is suggestive of metastases then he will need EGD and colonoscopy as part of his evaluations, especially given his first degree family history of colon cancer.    Please don't hesitate to call with any questions or concerns.    Tobin Maradiaga M.D., Ph.D.  Transplant Hepatology  Cell: [phone]

## 2021-08-27 NOTE — PROGRESS NOTE ADULT - SUBJECTIVE AND OBJECTIVE BOX
*****************************************  Camila London, PGY1  Internal Medicine  Pager NS: [phone]  *****************************************      Patient is a 71y old  Male who presents with a chief complaint of 71M p/w Left lower extremity swelling (26 Aug 2021 15:14) found to have extensive DVT within the infrarenal IVC that extends into the left common iliac vein and left external iliac vein to the left common femoral vein. Also found to have B/L PE      INTERVAL HPI/OVERNIGHT EVENTS:       SUBJECTIVE :          MEDICATIONS  (STANDING):  folic acid 1 milliGRAM(s) Oral daily  multivitamin 1 Tablet(s) Oral daily  rivaroxaban 15 milliGRAM(s) Oral two times a day with meals  thiamine IVPB 500 milliGRAM(s) IV Intermittent three times a day    MEDICATIONS  (PRN):  heparin   Injectable 6500 Unit(s) IV Push every 6 hours PRN For aPTT less than 40  heparin   Injectable 3000 Unit(s) IV Push every 6 hours PRN For aPTT between 40 - 57  LORazepam     Tablet 2 milliGRAM(s) Oral every 2 hours PRN Symptom-triggered 2 point increase in CIWA-Ar  LORazepam   Injectable 2 milliGRAM(s) IV Push every 1 hour PRN Symptom-triggered: each CIWA -Ar score 8 or GREATER      CAPILLARY BLOOD GLUCOSE        I&O's Summary    25 Aug 2021 07:01  -  26 Aug 2021 07:00  --------------------------------------------------------  IN: 240 mL / OUT: 1200 mL / NET: -960 mL    26 Aug 2021 07:01  -  27 Aug 2021 06:20  --------------------------------------------------------  IN: 0 mL / OUT: 2250 mL / NET: -2250 mL        PHYSICAL EXAM:  Vital Signs Last 24 Hrs  T(C): 36.6 (27 Aug 2021 03:00), Max: 36.8 (26 Aug 2021 11:00)  T(F): 97.9 (27 Aug 2021 03:00), Max: 98.2 (26 Aug 2021 11:00)  HR: 74 (27 Aug 2021 03:00) (74 - 83)  BP: 138/77 (27 Aug 2021 03:00) (137/76 - 146/84)  BP(mean): --  RR: 18 (27 Aug 2021 03:00) (18 - 18)  SpO2: 97% (27 Aug 2021 03:00) (96% - 97%)    CONSTITUTIONAL: NAD, well-groomed  EYES:  conjunctiva and sclera clear  ENMT: Moist oral mucosa  NECK: Supple, no palpable masses; no JVD  RESPIRATORY: Normal respiratory effort; lungs are clear to auscultation bilaterally  CARDIOVASCULAR: Regular rate and rhythm, normal S1 and S2, no murmur/rub/gallop; No lower extremity edema  ABDOMEN: Nontender to palpation, normoactive bowel sounds, no rebound/guarding  EXTREMITIES:  2+ DP, L lower leg warm, erythematous, swollen, tender to palp, + femoral pulses   SKIN: warm, dry, normal color, no rash or abnormal lesions  PSYCH: A+O to person, place, and time; affect appropriate    LABS:                        14.3   8.12  )-----------( 145      ( 26 Aug 2021 07:08 )             40.6     08-26    133<L>  |  100  |  12  ----------------------------<  166<H>  4.6   |  21<L>  |  0.80    Ca    8.7      26 Aug 2021 07:06  Phos  3.7     08-26  Mg     2.1     08-26    TPro  5.9<L>  /  Alb  2.9<L>  /  TBili  0.9  /  DBili  x   /  AST  11  /  ALT  13  /  AlkPhos  51  08-26    PT/INR - ( 25 Aug 2021 20:43 )   PT: 16.4 sec;   INR: 1.39 ratio         PTT - ( 26 Aug 2021 14:05 )  PTT:55.4 sec            RADIOLOGY & ADDITIONAL TESTS:  Results Reviewed:   Imaging Personally Reviewed:  Electrocardiogram Personally Reviewed:    COORDINATION OF CARE:  Care Discussed with Consultants/Other Providers [Y/N]:  Prior or Outpatient Records Reviewed [Y/N]:   *****************************************  Camila London, PGY1  Internal Medicine  Pager NS: [phone]  *****************************************      Patient is a 71y old  Male who presents with a chief complaint of 71M p/w Left lower extremity swelling (26 Aug 2021 15:14) found to have extensive DVT within the infrarenal IVC that extends into the left common iliac vein and left external iliac vein to the left common femoral vein. Also found to have B/L PE      INTERVAL HPI/OVERNIGHT EVENTS: NAEO       SUBJECTIVE : Pt seen and examined at bedside. Notes that he barely has any pain in his LE. Walked with PT yest     Denies CP, SOB, fever/chills, dysuria, hematuria, diarrhea/constipation.       MEDICATIONS  (STANDING):  folic acid 1 milliGRAM(s) Oral daily  multivitamin 1 Tablet(s) Oral daily  rivaroxaban 15 milliGRAM(s) Oral two times a day with meals  thiamine IVPB 500 milliGRAM(s) IV Intermittent three times a day    MEDICATIONS  (PRN):  heparin   Injectable 6500 Unit(s) IV Push every 6 hours PRN For aPTT less than 40  heparin   Injectable 3000 Unit(s) IV Push every 6 hours PRN For aPTT between 40 - 57  LORazepam     Tablet 2 milliGRAM(s) Oral every 2 hours PRN Symptom-triggered 2 point increase in CIWA-Ar  LORazepam   Injectable 2 milliGRAM(s) IV Push every 1 hour PRN Symptom-triggered: each CIWA -Ar score 8 or GREATER      CAPILLARY BLOOD GLUCOSE        I&O's Summary    25 Aug 2021 07:01  -  26 Aug 2021 07:00  --------------------------------------------------------  IN: 240 mL / OUT: 1200 mL / NET: -960 mL    26 Aug 2021 07:01  -  27 Aug 2021 06:20  --------------------------------------------------------  IN: 0 mL / OUT: 2250 mL / NET: -2250 mL        PHYSICAL EXAM:  Vital Signs Last 24 Hrs  T(C): 36.6 (27 Aug 2021 03:00), Max: 36.8 (26 Aug 2021 11:00)  T(F): 97.9 (27 Aug 2021 03:00), Max: 98.2 (26 Aug 2021 11:00)  HR: 74 (27 Aug 2021 03:00) (74 - 83)  BP: 138/77 (27 Aug 2021 03:00) (137/76 - 146/84)  BP(mean): --  RR: 18 (27 Aug 2021 03:00) (18 - 18)  SpO2: 97% (27 Aug 2021 03:00) (96% - 97%)    CONSTITUTIONAL: NAD, well-groomed  EYES:  conjunctiva and sclera clear  ENMT: Moist oral mucosa  NECK: Supple, no palpable masses; no JVD  RESPIRATORY: Normal respiratory effort; lungs are clear to auscultation bilaterally  CARDIOVASCULAR: Regular rate and rhythm, normal S1 and S2, no murmur/rub/gallop; No lower extremity edema  ABDOMEN: Nontender to palpation, normoactive bowel sounds, no rebound/guarding  EXTREMITIES:  2+ DP, L lower leg warm, erythematous, swollen, tender to palp, + femoral pulses   SKIN: warm, dry, normal color, no rash or abnormal lesions  PSYCH: A+O to person, place, and time; affect appropriate    LABS:                        14.3   8.12  )-----------( 145      ( 26 Aug 2021 07:08 )             40.6     08-26    133<L>  |  100  |  12  ----------------------------<  166<H>  4.6   |  21<L>  |  0.80    Ca    8.7      26 Aug 2021 07:06  Phos  3.7     08-26  Mg     2.1     08-26    TPro  5.9<L>  /  Alb  2.9<L>  /  TBili  0.9  /  DBili  x   /  AST  11  /  ALT  13  /  AlkPhos  51  08-26    PT/INR - ( 25 Aug 2021 20:43 )   PT: 16.4 sec;   INR: 1.39 ratio         PTT - ( 26 Aug 2021 14:05 )  PTT:55.4 sec            RADIOLOGY & ADDITIONAL TESTS:  Results Reviewed: Y  Imaging Personally Reviewed:  Electrocardiogram Personally Reviewed:    COORDINATION OF CARE:  Care Discussed with Consultants/Other Providers [Y/N]:  Prior or Outpatient Records Reviewed [Y/N]:

## 2021-08-27 NOTE — PROGRESS NOTE ADULT - SUBJECTIVE AND OBJECTIVE BOX
Vascular Cardiology  Progress note    [email or web address]   OFFICE [phone]      INTERVAL HISTORY:  No overnight events. Left Lower ext feels better. Minimally ambulatory. Denies chest pain, palpitations or SOB.         Allergies    No Known Allergies    Intolerances    	    MEDICATIONS:  heparin   Injectable 6500 Unit(s) IV Push every 6 hours PRN  heparin   Injectable 3000 Unit(s) IV Push every 6 hours PRN  rivaroxaban 15 milliGRAM(s) Oral two times a day with meals  LORazepam     Tablet 2 milliGRAM(s) Oral every 2 hours PRN  LORazepam   Injectable 2 milliGRAM(s) IV Push every 1 hour PRN  folic acid 1 milliGRAM(s) Oral daily  multivitamin 1 Tablet(s) Oral daily  thiamine IVPB 500 milliGRAM(s) IV Intermittent three times a day      PAST MEDICAL & SURGICAL HISTORY:  History of alcohol use    No significant past surgical history        FAMILY HISTORY:  No pertinent family history in first degree relatives        SOCIAL HISTORY:  unchanged    REVIEW OF SYSTEMS:  10 Point ROS negative unless otherwise noted	    [ x] All others negative	  [ ] Unable to obtain    PHYSICAL EXAM:  T(C): 36.4 (08-27-21 @ 07:00), Max: 36.8 (08-26-21 @ 11:00)  HR: 75 (08-27-21 @ 07:00) (74 - 83)  BP: 132/82 (08-27-21 @ 07:00) (132/82 - 146/84)  RR: 18 (08-27-21 @ 07:00) (18 - 18)  SpO2: 96% (08-27-21 @ 07:00) (96% - 97%)  Wt(kg): --  I&O's Summary    26 Aug 2021 07:01  -  27 Aug 2021 07:00  --------------------------------------------------------  IN: 0 mL / OUT: 2250 mL / NET: -2250 mL      Appearance:  	  HEENT:   Normal oral mucosa, PERRL, EOMI	  Carotid: No bruit   Lymphatic: No lymphadenopathy  Cardiovascular:  S1/S2, No murmur  Respiratory: CTAB   Psychiatry:  AAO x 3  Gastrointestinal:  Soft, Non-tender, + BS	  Skin: No rashes, No ecchymoses, No cyanosis	  Neurologic:  Non-focal   Extremities:  improved warmth and swelling     Vascular Pulse Exam:  Right DP: [x]palpable []non-palpable []audible      Left DP :   [x]palpable []non-palpable []audible      LABS:	 	    CBC Full  -  ( 27 Aug 2021 06:47 )  WBC Count : 6.24 K/uL  Hemoglobin : 13.6 g/dL  Hematocrit : 38.6 %  Platelet Count - Automated : 175 K/uL  Mean Cell Volume : 98.5 fl  Mean Cell Hemoglobin : 34.7 pg  Mean Cell Hemoglobin Concentration : 35.2 gm/dL  Auto Neutrophil # : x  Auto Lymphocyte # : x  Auto Monocyte # : x  Auto Eosinophil # : x  Auto Basophil # : x  Auto Neutrophil % : x  Auto Lymphocyte % : x  Auto Monocyte % : x  Auto Eosinophil % : x  Auto Basophil % : x    08-27    135  |  101  |  11  ----------------------------<  117<H>  3.7   |  22  |  0.77  08-26    133<L>  |  100  |  12  ----------------------------<  166<H>  4.6   |  21<L>  |  0.80    Ca    8.6      27 Aug 2021 06:46  Ca    8.7      26 Aug 2021 07:06  Phos  2.5     08-27  Phos  3.7     08-26  Mg     2.1     08-27  Mg     2.1     08-26    TPro  5.8<L>  /  Alb  2.9<L>  /  TBili  0.6  /  DBili  0.2  /  AST  14  /  ALT  14  /  AlkPhos  48  08-27  TPro  5.9<L>  /  Alb  2.9<L>  /  TBili  0.9  /  DBili  x   /  AST  11  /  ALT  13  /  AlkPhos  51  08-26

## 2021-08-27 NOTE — PROGRESS NOTE ADULT - ATTENDING COMMENTS
Cont  Xarelto 15 mg BID with meals x 21 days, then transition to 20 mg QD after 21 days   Encourage ambulation   Social work eval please  outpatient followup in the office      Zoe [phone]

## 2021-08-27 NOTE — PROGRESS NOTE ADULT - ATTENDING COMMENTS
d/w HS team  71M with PMH of alcohol abuse (recent hospitalization for alcohol withdrawal symptoms of seizures and delirium) presenting with left lower leg swelling and pain, found to have extensive L lower leg DVT  IVC thrombus, and b/l PE    dc heparin and switch to xarelto tonight 7p per vasc-cards recs  CT findings reviewed- hepatic nodules/massess- noted r/o primary/secondary malignancy   hepatology cs  appreciated  MRI liver/abd, AFP, tumor markers( hx of colon ca first degree relative)    [facility]  [phone] d/w HS team  71M with PMH of alcohol abuse (recent hospitalization for alcohol withdrawal symptoms of seizures and delirium) presenting with left lower leg swelling and pain, found to have extensive L lower leg DVT  IVC thrombus, and b/l PE    heparin switched to xarelto, but considering likely plans for hepatic bx/colonoscopy bx- will need to switch back to heparin  CT findings reviewed- hepatic nodules/massess- noted r/o primary/secondary malignancy   hepatology cs  appreciated  MRI liver/abd, AFP, tumor markers( hx of colon ca first degree relative)    [facility]  [phone]

## 2021-08-27 NOTE — PROGRESS NOTE ADULT - PROBLEM SELECTOR PLAN 5
DVT Ppx: heparin gtt  Diet: Regular  F/u PT and OT recs Total bili 1.5, indirect bili 1.1, thrombocytopenia 97K, Hg 17    Plan:   -Monitor

## 2021-08-27 NOTE — PROGRESS NOTE ADULT - PROBLEM SELECTOR PLAN 1
Acute lower leg pain and swelling with warmth, extensive thrombus on venous US.  Palpable DP pulses, warm, perfused, low concern for limb ischemia  Most likely provoked DVT i/s/o immobility from recent hospitalization  CT Venogram Abd + Pelvis: Thrombus within the infrarenal IVC extends into the left common iliac vein and left external iliac vein to the left common femoral vein.  CT Chest: Bilateral pulmonary emboli.  POD # 1 from LLE-iliocaval DVT thrombectomy.    Plan  -c/w Heparin gtt, long term AC for 6 months  -Outpatient heme follow up  -IR planning LLE thrombectomy/ thrombolysis 8/25

## 2021-08-27 NOTE — PROGRESS NOTE ADULT - SUBJECTIVE AND OBJECTIVE BOX
Chief Complaint:  Patient is a 71y old  Male who presents with a chief complaint of 71M p/w Left lower extremity swelling (27 Aug 2021 06:20)      Interval Events:       Hospital Medications:  folic acid 1 milliGRAM(s) Oral daily  heparin   Injectable 6500 Unit(s) IV Push every 6 hours PRN  heparin   Injectable 3000 Unit(s) IV Push every 6 hours PRN  LORazepam     Tablet 2 milliGRAM(s) Oral every 2 hours PRN  LORazepam   Injectable 2 milliGRAM(s) IV Push every 1 hour PRN  multivitamin 1 Tablet(s) Oral daily  rivaroxaban 15 milliGRAM(s) Oral two times a day with meals  thiamine IVPB 500 milliGRAM(s) IV Intermittent three times a day      PMHX/PSHX:  Alcohol abuse    History of alcohol use    No significant past surgical history        ROS: 14 point ROS negative unless otherwise stated in subjective      PHYSICAL EXAM:     GENERAL:  Appears stated age, chronically ill appearing, in mild distress  HEENT:  NC/AT,  conjunctivae clear and pink  CHEST:  Full & symmetric excursion, no increased effort, breath sounds clear anteriorly  HEART:  Regular rhythm, S1, S2, no murmur/rub/S3/S4  ABDOMEN:  Soft, non-tender, +mildly-distended, +bowel sounds  EXTREMITIES:  L>R LE edema (unable to grade edema 2/2 BL leg wraps); +BL LE are wrapped  SKIN:  warm, dry; no spider angiomas or palmar erythema  NEURO:  Alert, orientedx2 (person, knows he is in a hospital but unsure of hospital name, oriented to year but not month or day); +BL UE mildly tremulous but no asterixis  PSYCH: emotionally labile- at times agitated but also tearful    Vital Signs:  Vital Signs Last 24 Hrs  T(C): 36.4 (27 Aug 2021 07:00), Max: 36.8 (26 Aug 2021 11:00)  T(F): 97.5 (27 Aug 2021 07:00), Max: 98.2 (26 Aug 2021 11:00)  HR: 75 (27 Aug 2021 07:00) (74 - 83)  BP: 132/82 (27 Aug 2021 07:00) (132/82 - 146/84)  BP(mean): --  RR: 18 (27 Aug 2021 07:00) (18 - 18)  SpO2: 96% (27 Aug 2021 07:00) (96% - 97%)  Daily     Daily     LABS:                        13.6   6.24  )-----------( 175      ( 27 Aug 2021 06:47 )             38.6     08-27    135  |  101  |  11  ----------------------------<  117<H>  3.7   |  22  |  0.77    Ca    8.6      27 Aug 2021 06:46  Phos  2.5     08-27  Mg     2.1     08-27    TPro  5.8<L>  /  Alb  2.9<L>  /  TBili  0.6  /  DBili  0.2  /  AST  14  /  ALT  14  /  AlkPhos  48  08-27    LIVER FUNCTIONS - ( 27 Aug 2021 06:46 )  Alb: 2.9 g/dL / Pro: 5.8 g/dL / ALK PHOS: 48 U/L / ALT: 14 U/L / AST: 14 U/L / GGT: x           PT/INR - ( 25 Aug 2021 20:43 )   PT: 16.4 sec;   INR: 1.39 ratio         PTT - ( 26 Aug 2021 14:05 )  PTT:55.4 sec        Imaging:             Chief Complaint:  Patient is a 71y old  Male who presents with a chief complaint of 71M p/w Left lower extremity swelling (27 Aug 2021 06:20)      Interval Events: Reports he feels agitated this AM. Otherwise denies any abdominal pain/N/V/D/C.       Hospital Medications:  folic acid 1 milliGRAM(s) Oral daily  heparin   Injectable 6500 Unit(s) IV Push every 6 hours PRN  heparin   Injectable 3000 Unit(s) IV Push every 6 hours PRN  LORazepam     Tablet 2 milliGRAM(s) Oral every 2 hours PRN  LORazepam   Injectable 2 milliGRAM(s) IV Push every 1 hour PRN  multivitamin 1 Tablet(s) Oral daily  rivaroxaban 15 milliGRAM(s) Oral two times a day with meals  thiamine IVPB 500 milliGRAM(s) IV Intermittent three times a day      PMHX/PSHX:  Alcohol abuse    History of alcohol use    No significant past surgical history        ROS: 14 point ROS negative unless otherwise stated in subjective      PHYSICAL EXAM:     GENERAL:  Appears stated age, chronically ill appearing, in no distress  HEENT:  NC/AT,  conjunctivae clear and pink  CHEST:  Full & symmetric excursion, no increased effort, breath sounds clear anteriorly  HEART:  Regular rhythm, S1, S2, no murmur/rub/S3/S4  ABDOMEN:  Soft, non-tender, +mildly-distended, +bowel sounds  EXTREMITIES:  L>R LE edema (unable to grade edema 2/2 BL leg wraps); +BL LE are wrapped  SKIN:  warm, dry; no spider angiomas or palmar erythema  NEURO:  Alert, orientedx2 (person, knows he is in a hospital but unsure of hospital name, oriented to year but not month or day); no asterixis      Vital Signs:  Vital Signs Last 24 Hrs  T(C): 36.4 (27 Aug 2021 07:00), Max: 36.8 (26 Aug 2021 11:00)  T(F): 97.5 (27 Aug 2021 07:00), Max: 98.2 (26 Aug 2021 11:00)  HR: 75 (27 Aug 2021 07:00) (74 - 83)  BP: 132/82 (27 Aug 2021 07:00) (132/82 - 146/84)  BP(mean): --  RR: 18 (27 Aug 2021 07:00) (18 - 18)  SpO2: 96% (27 Aug 2021 07:00) (96% - 97%)  Daily     Daily     LABS:                        13.6   6.24  )-----------( 175      ( 27 Aug 2021 06:47 )             38.6     08-27    135  |  101  |  11  ----------------------------<  117<H>  3.7   |  22  |  0.77    Ca    8.6      27 Aug 2021 06:46  Phos  2.5     08-27  Mg     2.1     08-27    TPro  5.8<L>  /  Alb  2.9<L>  /  TBili  0.6  /  DBili  0.2  /  AST  14  /  ALT  14  /  AlkPhos  48  08-27    LIVER FUNCTIONS - ( 27 Aug 2021 06:46 )  Alb: 2.9 g/dL / Pro: 5.8 g/dL / ALK PHOS: 48 U/L / ALT: 14 U/L / AST: 14 U/L / GGT: x           PT/INR - ( 25 Aug 2021 20:43 )   PT: 16.4 sec;   INR: 1.39 ratio         PTT - ( 26 Aug 2021 14:05 )  PTT:55.4 sec        Imaging: No new abdominal imaging             Chief Complaint:  Patient is a 71y old  Male who presents with a chief complaint of 71M p/w Left lower extremity swelling (27 Aug 2021 06:20)      Interval Events: Reports he feels agitated this AM. Otherwise denies any abdominal pain/N/V/D/C.       Hospital Medications:  folic acid 1 milliGRAM(s) Oral daily  heparin   Injectable 6500 Unit(s) IV Push every 6 hours PRN  heparin   Injectable 3000 Unit(s) IV Push every 6 hours PRN  LORazepam     Tablet 2 milliGRAM(s) Oral every 2 hours PRN  LORazepam   Injectable 2 milliGRAM(s) IV Push every 1 hour PRN  multivitamin 1 Tablet(s) Oral daily  rivaroxaban 15 milliGRAM(s) Oral two times a day with meals  thiamine IVPB 500 milliGRAM(s) IV Intermittent three times a day      PMHX/PSHX:  Alcohol abuse    History of alcohol use    No significant past surgical history        ROS: 14 point ROS negative unless otherwise stated in subjective      PHYSICAL EXAM:     GENERAL:  Appears stated age, chronically ill appearing, in no distress  HEENT:  NC/AT,  conjunctivae clear and pink  CHEST:  Full & symmetric excursion, no increased effort, breath sounds clear anteriorly  HEART:  Regular rhythm, S1, S2, no murmur/rub/S3/S4  ABDOMEN:  Soft, non-tender, +central adiposity, non-distended, +bowel sounds, no palpable masses  EXTREMITIES:  L>R LE edema (unable to grade edema 2/2 BL leg wraps); +BL LE are wrapped  SKIN:  warm, dry; no spider angiomas or palmar erythema  NEURO:  Alert, orientedx2 (person, knows he is in a hospital but unsure of hospital name, oriented to year but not month or day); no asterixis      Vital Signs:  Vital Signs Last 24 Hrs  T(C): 36.4 (27 Aug 2021 07:00), Max: 36.8 (26 Aug 2021 11:00)  T(F): 97.5 (27 Aug 2021 07:00), Max: 98.2 (26 Aug 2021 11:00)  HR: 75 (27 Aug 2021 07:00) (74 - 83)  BP: 132/82 (27 Aug 2021 07:00) (132/82 - 146/84)  BP(mean): --  RR: 18 (27 Aug 2021 07:00) (18 - 18)  SpO2: 96% (27 Aug 2021 07:00) (96% - 97%)  Daily     Daily     LABS:                        13.6   6.24  )-----------( 175      ( 27 Aug 2021 06:47 )             38.6     08-27    135  |  101  |  11  ----------------------------<  117<H>  3.7   |  22  |  0.77    Ca    8.6      27 Aug 2021 06:46  Phos  2.5     08-27  Mg     2.1     08-27    TPro  5.8<L>  /  Alb  2.9<L>  /  TBili  0.6  /  DBili  0.2  /  AST  14  /  ALT  14  /  AlkPhos  48  08-27    LIVER FUNCTIONS - ( 27 Aug 2021 06:46 )  Alb: 2.9 g/dL / Pro: 5.8 g/dL / ALK PHOS: 48 U/L / ALT: 14 U/L / AST: 14 U/L / GGT: x           PT/INR - ( 25 Aug 2021 20:43 )   PT: 16.4 sec;   INR: 1.39 ratio         PTT - ( 26 Aug 2021 14:05 )  PTT:55.4 sec        Imaging: No new abdominal imaging

## 2021-08-28 LAB
AFP-TM SERPL-MCNC: <1.8 NG/ML — SIGNIFICANT CHANGE UP
ALBUMIN SERPL ELPH-MCNC: 3.2 G/DL — LOW (ref 3.3–5)
ALP SERPL-CCNC: 56 U/L — SIGNIFICANT CHANGE UP (ref 40–120)
ALT FLD-CCNC: 19 U/L — SIGNIFICANT CHANGE UP (ref 10–45)
AMMONIA BLD-MCNC: 24 UMOL/L — SIGNIFICANT CHANGE UP (ref 11–55)
ANION GAP SERPL CALC-SCNC: 13 MMOL/L — SIGNIFICANT CHANGE UP (ref 5–17)
AST SERPL-CCNC: 18 U/L — SIGNIFICANT CHANGE UP (ref 10–40)
BILIRUB SERPL-MCNC: 0.8 MG/DL — SIGNIFICANT CHANGE UP (ref 0.2–1.2)
BUN SERPL-MCNC: 12 MG/DL — SIGNIFICANT CHANGE UP (ref 7–23)
CALCIUM SERPL-MCNC: 9.2 MG/DL — SIGNIFICANT CHANGE UP (ref 8.4–10.5)
CANCER AG19-9 SERPL-ACNC: 8 U/ML — SIGNIFICANT CHANGE UP
CEA SERPL-MCNC: 0.8 NG/ML — SIGNIFICANT CHANGE UP (ref 0–3.8)
CHLORIDE SERPL-SCNC: 100 MMOL/L — SIGNIFICANT CHANGE UP (ref 96–108)
CO2 SERPL-SCNC: 22 MMOL/L — SIGNIFICANT CHANGE UP (ref 22–31)
CREAT SERPL-MCNC: 0.79 MG/DL — SIGNIFICANT CHANGE UP (ref 0.5–1.3)
GLUCOSE SERPL-MCNC: 113 MG/DL — HIGH (ref 70–99)
HCT VFR BLD CALC: 40.6 % — SIGNIFICANT CHANGE UP (ref 39–50)
HGB BLD-MCNC: 14.1 G/DL — SIGNIFICANT CHANGE UP (ref 13–17)
INR BLD: 1.59 RATIO — HIGH (ref 0.88–1.16)
LKM AB SER-ACNC: <20.1 UNITS — SIGNIFICANT CHANGE UP (ref 0–20)
MCHC RBC-ENTMCNC: 34.5 PG — HIGH (ref 27–34)
MCHC RBC-ENTMCNC: 34.7 GM/DL — SIGNIFICANT CHANGE UP (ref 32–36)
MCV RBC AUTO: 99.3 FL — SIGNIFICANT CHANGE UP (ref 80–100)
MELD SCORE WITH DIALYSIS: 26 POINTS — SIGNIFICANT CHANGE UP
MELD SCORE WITHOUT DIALYSIS: 14 POINTS — SIGNIFICANT CHANGE UP
NRBC # BLD: 0 /100 WBCS — SIGNIFICANT CHANGE UP (ref 0–0)
PLATELET # BLD AUTO: 191 K/UL — SIGNIFICANT CHANGE UP (ref 150–400)
POTASSIUM SERPL-MCNC: 4.3 MMOL/L — SIGNIFICANT CHANGE UP (ref 3.5–5.3)
POTASSIUM SERPL-SCNC: 4.3 MMOL/L — SIGNIFICANT CHANGE UP (ref 3.5–5.3)
PROT SERPL-MCNC: 6.2 G/DL — SIGNIFICANT CHANGE UP (ref 6–8.3)
PROTHROM AB SERPL-ACNC: 18.7 SEC — HIGH (ref 10.6–13.6)
RBC # BLD: 4.09 M/UL — LOW (ref 4.2–5.8)
RBC # FLD: 11.9 % — SIGNIFICANT CHANGE UP (ref 10.3–14.5)
SODIUM SERPL-SCNC: 135 MMOL/L — SIGNIFICANT CHANGE UP (ref 135–145)
WBC # BLD: 6.28 K/UL — SIGNIFICANT CHANGE UP (ref 3.8–10.5)
WBC # FLD AUTO: 6.28 K/UL — SIGNIFICANT CHANGE UP (ref 3.8–10.5)

## 2021-08-28 RX ORDER — ALPRAZOLAM 0.25 MG
0.5 TABLET ORAL
Refills: 0 | Status: DISCONTINUED | OUTPATIENT
Start: 2021-08-28 | End: 2021-08-30

## 2021-08-28 RX ADMIN — SENNA PLUS 2 TABLET(S): 8.6 TABLET ORAL at 22:15

## 2021-08-28 RX ADMIN — POLYETHYLENE GLYCOL 3350 17 GRAM(S): 17 POWDER, FOR SOLUTION ORAL at 17:33

## 2021-08-28 RX ADMIN — Medication 0.5 MILLIGRAM(S): at 10:02

## 2021-08-28 RX ADMIN — RIVAROXABAN 15 MILLIGRAM(S): KIT at 10:03

## 2021-08-28 RX ADMIN — RIVAROXABAN 15 MILLIGRAM(S): KIT at 17:31

## 2021-08-28 RX ADMIN — Medication 0.5 MILLIGRAM(S): at 17:30

## 2021-08-28 RX ADMIN — Medication 105 MILLIGRAM(S): at 05:59

## 2021-08-28 RX ADMIN — Medication 105 MILLIGRAM(S): at 22:16

## 2021-08-28 RX ADMIN — Medication 1 MILLIGRAM(S): at 13:53

## 2021-08-28 RX ADMIN — POLYETHYLENE GLYCOL 3350 17 GRAM(S): 17 POWDER, FOR SOLUTION ORAL at 05:59

## 2021-08-28 RX ADMIN — Medication 1 TABLET(S): at 13:53

## 2021-08-28 RX ADMIN — Medication 105 MILLIGRAM(S): at 13:54

## 2021-08-28 NOTE — PROGRESS NOTE ADULT - SUBJECTIVE AND OBJECTIVE BOX
*****************************************  Camila London, PGY1  Internal Medicine  Pager NS: [phone]  *****************************************      Patient is a 71y old  Male who presents with a chief complaint of 71M p/w Left lower extremity swelling (27 Aug 2021 10:17)      INTERVAL HPI/OVERNIGHT EVENTS:       SUBJECTIVE :        MEDICATIONS  (STANDING):  folic acid 1 milliGRAM(s) Oral daily  multivitamin 1 Tablet(s) Oral daily  polyethylene glycol 3350 17 Gram(s) Oral two times a day  rivaroxaban 15 milliGRAM(s) Oral two times a day with meals  senna 2 Tablet(s) Oral at bedtime  thiamine IVPB 500 milliGRAM(s) IV Intermittent three times a day    MEDICATIONS  (PRN):  ALPRAZolam 0.5 milliGRAM(s) Oral daily PRN Anxiety      CAPILLARY BLOOD GLUCOSE        I&O's Summary    26 Aug 2021 07:01  -  27 Aug 2021 07:00  --------------------------------------------------------  IN: 0 mL / OUT: 2250 mL / NET: -2250 mL    27 Aug 2021 07:01  -  28 Aug 2021 06:44  --------------------------------------------------------  IN: 100 mL / OUT: 1550 mL / NET: -1450 mL        PHYSICAL EXAM:  Vital Signs Last 24 Hrs  T(C): 36.7 (28 Aug 2021 06:35), Max: 36.7 (28 Aug 2021 06:35)  T(F): 98.1 (28 Aug 2021 06:35), Max: 98.1 (28 Aug 2021 06:35)  HR: 74 (28 Aug 2021 06:35) (74 - 91)  BP: 150/80 (28 Aug 2021 06:35) (132/82 - 160/63)  BP(mean): --  RR: 18 (28 Aug 2021 06:35) (18 - 20)  SpO2: 95% (28 Aug 2021 06:35) (95% - 98%)    CONSTITUTIONAL: NAD, well-groomed  EYES:  conjunctiva and sclera clear  ENMT: Moist oral mucosa  NECK: Supple, no palpable masses; no JVD  RESPIRATORY: Normal respiratory effort; lungs are clear to auscultation bilaterally  CARDIOVASCULAR: Regular rate and rhythm, normal S1 and S2, no murmur/rub/gallop; No lower extremity edema  ABDOMEN: Nontender to palpation, normoactive bowel sounds, no rebound/guarding  EXTREMITIES:  2+ DP, L>R LE edema (unable to grade edema 2/2 BL leg wraps); +BL LE are wrapped  SKIN: warm, dry, normal color, no rash or abnormal lesions  PSYCH: A+O to person, place, and time; affect appropriate    LABS:                        13.6   6.24  )-----------( 175      ( 27 Aug 2021 06:47 )             38.6     08-27    135  |  101  |  11  ----------------------------<  117<H>  3.7   |  22  |  0.77    Ca    8.6      27 Aug 2021 06:46  Phos  2.5     08-27  Mg     2.1     08-27    TPro  5.8<L>  /  Alb  2.9<L>  /  TBili  0.6  /  DBili  0.2  /  AST  14  /  ALT  14  /  AlkPhos  48  08-27    PT/INR - ( 27 Aug 2021 06:47 )   PT: 14.6 sec;   INR: 1.23 ratio         PTT - ( 26 Aug 2021 14:05 )  PTT:55.4 sec            RADIOLOGY & ADDITIONAL TESTS:  Results Reviewed: Y  Imaging Personally Reviewed:  Electrocardiogram Personally Reviewed:    COORDINATION OF CARE:  Care Discussed with Consultants/Other Providers [Y/N]:  Prior or Outpatient Records Reviewed [Y/N]:   *****************************************  Camila London, PGY1  Internal Medicine  Pager NS: [phone]  *****************************************      Patient is a 71y old  Male who presents with a chief complaint of 71M p/w Left lower extremity swelling (27 Aug 2021 10:17)      SUBJECTIVE : NAEO. Pt seen and examined at bedside. He notes that he wants to go home and does not want to wait for the MRI; he wants to see his family. Notes LE pain has improved significantly since procedure. Denies CP, SOB, fever/chills, dysuria, hematuria, diarrhea/constipation.     MEDICATIONS  (STANDING):  folic acid 1 milliGRAM(s) Oral daily  multivitamin 1 Tablet(s) Oral daily  polyethylene glycol 3350 17 Gram(s) Oral two times a day  rivaroxaban 15 milliGRAM(s) Oral two times a day with meals  senna 2 Tablet(s) Oral at bedtime  thiamine IVPB 500 milliGRAM(s) IV Intermittent three times a day    MEDICATIONS  (PRN):  ALPRAZolam 0.5 milliGRAM(s) Oral daily PRN Anxiety      CAPILLARY BLOOD GLUCOSE        I&O's Summary    26 Aug 2021 07:01  -  27 Aug 2021 07:00  --------------------------------------------------------  IN: 0 mL / OUT: 2250 mL / NET: -2250 mL    27 Aug 2021 07:01  -  28 Aug 2021 06:44  --------------------------------------------------------  IN: 100 mL / OUT: 1550 mL / NET: -1450 mL        PHYSICAL EXAM:  Vital Signs Last 24 Hrs  T(C): 36.7 (28 Aug 2021 06:35), Max: 36.7 (28 Aug 2021 06:35)  T(F): 98.1 (28 Aug 2021 06:35), Max: 98.1 (28 Aug 2021 06:35)  HR: 74 (28 Aug 2021 06:35) (74 - 91)  BP: 150/80 (28 Aug 2021 06:35) (132/82 - 160/63)  BP(mean): --  RR: 18 (28 Aug 2021 06:35) (18 - 20)  SpO2: 95% (28 Aug 2021 06:35) (95% - 98%)    CONSTITUTIONAL: NAD, well-groomed  EYES:  conjunctiva and sclera clear  ENMT: Moist oral mucosa  NECK: Supple, no palpable masses; no JVD  RESPIRATORY: Normal respiratory effort; lungs are clear to auscultation bilaterally  CARDIOVASCULAR: Regular rate and rhythm, normal S1 and S2, no murmur/rub/gallop; No lower extremity edema  ABDOMEN: Nontender to palpation, normoactive bowel sounds, no rebound/guarding  EXTREMITIES:  2+ DP, L>R LE edema (unable to grade edema 2/2 BL leg wraps); +BL LE are wrapped  SKIN: warm, dry, normal color, no rash or abnormal lesions  PSYCH: A+O to person, place, and time; affect appropriate    LABS:                        13.6   6.24  )-----------( 175      ( 27 Aug 2021 06:47 )             38.6     08-27    135  |  101  |  11  ----------------------------<  117<H>  3.7   |  22  |  0.77    Ca    8.6      27 Aug 2021 06:46  Phos  2.5     08-27  Mg     2.1     08-27    TPro  5.8<L>  /  Alb  2.9<L>  /  TBili  0.6  /  DBili  0.2  /  AST  14  /  ALT  14  /  AlkPhos  48  08-27    PT/INR - ( 27 Aug 2021 06:47 )   PT: 14.6 sec;   INR: 1.23 ratio         PTT - ( 26 Aug 2021 14:05 )  PTT:55.4 sec            RADIOLOGY & ADDITIONAL TESTS:  Results Reviewed: Y  Imaging Personally Reviewed:  Electrocardiogram Personally Reviewed:    COORDINATION OF CARE:  Care Discussed with Consultants/Other Providers [Y/N]:  Prior or Outpatient Records Reviewed [Y/N]:

## 2021-08-28 NOTE — PROGRESS NOTE ADULT - ATTENDING COMMENTS
d/w HS team  71M with PMH of alcohol abuse (recent hospitalization for alcohol withdrawal symptoms of seizures and delirium) presenting with left lower leg swelling and pain, found to have extensive L lower leg DVT  IVC thrombus, and b/l PE  LLE-iliocaval DVT thrombectomy 8/25    heparin switched to xarelto, but considering likely plans for hepatic bx/colonoscopy bx- will need to switch back to heparin  CT findings reviewed- hepatic nodules/massess- noted r/o primary/secondary malignancy   hepatology cs  appreciated  MRI liver/abd, AFP, tumor markers( hx of colon ca first degree relative)    [facility]  [phone]

## 2021-08-28 NOTE — PROGRESS NOTE ADULT - PROBLEM SELECTOR PLAN 1
Acute lower leg pain and swelling with warmth, extensive thrombus on venous US.  Palpable DP pulses, warm, perfused, low concern for limb ischemia  Most likely provoked DVT i/s/o immobility from recent hospitalization  CT Venogram Abd + Pelvis: Thrombus within the infrarenal IVC extends into the left common iliac vein and left external iliac vein to the left common femoral vein.  CT Chest: Bilateral pulmonary emboli.  POD # 1 from LLE-iliocaval DVT thrombectomy.    Plan  -c/w Heparin gtt, long term AC for 6 months  -Outpatient heme follow up  -IR planning LLE thrombectomy/ thrombolysis 8/25 Acute lower leg pain and swelling with warmth, extensive thrombus on venous US.  Palpable DP pulses, warm, perfused, low concern for limb ischemia  Most likely provoked DVT i/s/o immobility from recent hospitalization  CT Venogram Abd + Pelvis: Thrombus within the infrarenal IVC extends into the left common iliac vein and left external iliac vein to the left common femoral vein.  CT Chest: Bilateral pulmonary emboli.  POD # 1 from LLE-iliocaval DVT thrombectomy.    Plan  -c/w xarelto   -Outpatient heme follow up  -IR planning LLE thrombectomy/ thrombolysis 8/25

## 2021-08-28 NOTE — PROGRESS NOTE ADULT - PROBLEM SELECTOR PLAN 6
DVT Ppx: heparin gtt  Diet: Regular  PT and OT recs: TRAN DVT Ppx: xoralto   Diet: Regular  PT and OT recs: TRAN

## 2021-08-28 NOTE — PROGRESS NOTE ADULT - PROBLEM SELECTOR PLAN 2
CT Chest: Bilateral pulmonary emboli.    Plan  -c/w Heparin gtt, long term AC for 6 months CT Chest: Bilateral pulmonary emboli.    Plan  -c/w xarelto

## 2021-08-28 NOTE — PROGRESS NOTE ADULT - PROBLEM SELECTOR PLAN 3
Recent hospitalization for alcohol withdrawal symptoms of seizures and delirium, currently no symptoms or signs of DT  US: early cirrhosis   B12, folate wnl  CT Abd and Pelvis: There are bilobar hypodense hepatic lesions.   The left lobe lesion the subtle area of peripheral enhancement and measures 3.5 x 2.8 cm.  The right lobe lesion measures 1.1 x 1.0 cm.  There is an additional lesion in the caudate lobe of the liver. The lesion measures 1.4 x 1.2 cm.  CIWA scores; 1-2    Plan:   -c/w thiamine  -Folate and multivitamin daily  -home xanax for anxiety  - Hepatology following Recent hospitalization for alcohol withdrawal symptoms of seizures and delirium, currently no symptoms or signs of DT  US: early cirrhosis   B12, folate wnl  CT Abd and Pelvis: There are bilobar hypodense hepatic lesions.   The left lobe lesion the subtle area of peripheral enhancement and measures 3.5 x 2.8 cm.  The right lobe lesion measures 1.1 x 1.0 cm.  There is an additional lesion in the caudate lobe of the liver. The lesion measures 1.4 x 1.2 cm.  CIWA scores; 1-2; CIWA discontinued     Plan:   -c/w thiamine  -Folate and multivitamin daily  -home xanax for anxiety  - Hepatology following

## 2021-08-29 LAB
ALBUMIN SERPL ELPH-MCNC: 3.2 G/DL — LOW (ref 3.3–5)
ALP SERPL-CCNC: 57 U/L — SIGNIFICANT CHANGE UP (ref 40–120)
ALT FLD-CCNC: 25 U/L — SIGNIFICANT CHANGE UP (ref 10–45)
ANION GAP SERPL CALC-SCNC: 11 MMOL/L — SIGNIFICANT CHANGE UP (ref 5–17)
APTT BLD: 34.7 SEC — SIGNIFICANT CHANGE UP (ref 27.5–35.5)
AST SERPL-CCNC: 22 U/L — SIGNIFICANT CHANGE UP (ref 10–40)
BILIRUB SERPL-MCNC: 0.8 MG/DL — SIGNIFICANT CHANGE UP (ref 0.2–1.2)
BUN SERPL-MCNC: 9 MG/DL — SIGNIFICANT CHANGE UP (ref 7–23)
CALCIUM SERPL-MCNC: 9 MG/DL — SIGNIFICANT CHANGE UP (ref 8.4–10.5)
CHLORIDE SERPL-SCNC: 101 MMOL/L — SIGNIFICANT CHANGE UP (ref 96–108)
CO2 SERPL-SCNC: 21 MMOL/L — LOW (ref 22–31)
CREAT SERPL-MCNC: 0.75 MG/DL — SIGNIFICANT CHANGE UP (ref 0.5–1.3)
GLUCOSE SERPL-MCNC: 147 MG/DL — HIGH (ref 70–99)
HCT VFR BLD CALC: 42.5 % — SIGNIFICANT CHANGE UP (ref 39–50)
HGB BLD-MCNC: 15.2 G/DL — SIGNIFICANT CHANGE UP (ref 13–17)
INR BLD: 1.7 RATIO — HIGH (ref 0.88–1.16)
MAGNESIUM SERPL-MCNC: 2.1 MG/DL — SIGNIFICANT CHANGE UP (ref 1.6–2.6)
MCHC RBC-ENTMCNC: 34.9 PG — HIGH (ref 27–34)
MCHC RBC-ENTMCNC: 35.8 GM/DL — SIGNIFICANT CHANGE UP (ref 32–36)
MCV RBC AUTO: 97.7 FL — SIGNIFICANT CHANGE UP (ref 80–100)
NRBC # BLD: 0 /100 WBCS — SIGNIFICANT CHANGE UP (ref 0–0)
PHOSPHATE SERPL-MCNC: 3.2 MG/DL — SIGNIFICANT CHANGE UP (ref 2.5–4.5)
PLATELET # BLD AUTO: 213 K/UL — SIGNIFICANT CHANGE UP (ref 150–400)
POTASSIUM SERPL-MCNC: 4.1 MMOL/L — SIGNIFICANT CHANGE UP (ref 3.5–5.3)
POTASSIUM SERPL-SCNC: 4.1 MMOL/L — SIGNIFICANT CHANGE UP (ref 3.5–5.3)
PROT SERPL-MCNC: 6.3 G/DL — SIGNIFICANT CHANGE UP (ref 6–8.3)
PROTHROM AB SERPL-ACNC: 19.9 SEC — HIGH (ref 10.6–13.6)
RBC # BLD: 4.35 M/UL — SIGNIFICANT CHANGE UP (ref 4.2–5.8)
RBC # FLD: 11.7 % — SIGNIFICANT CHANGE UP (ref 10.3–14.5)
SODIUM SERPL-SCNC: 133 MMOL/L — LOW (ref 135–145)
WBC # BLD: 6.08 K/UL — SIGNIFICANT CHANGE UP (ref 3.8–10.5)
WBC # FLD AUTO: 6.08 K/UL — SIGNIFICANT CHANGE UP (ref 3.8–10.5)

## 2021-08-29 PROCEDURE — 74183 MRI ABD W/O CNTR FLWD CNTR: CPT | Mod: 26

## 2021-08-29 RX ORDER — HEPARIN SODIUM 5000 [USP'U]/ML
3000 INJECTION INTRAVENOUS; SUBCUTANEOUS EVERY 6 HOURS
Refills: 0 | Status: DISCONTINUED | OUTPATIENT
Start: 2021-08-29 | End: 2021-08-29

## 2021-08-29 RX ORDER — HEPARIN SODIUM 5000 [USP'U]/ML
INJECTION INTRAVENOUS; SUBCUTANEOUS
Qty: 25000 | Refills: 0 | Status: DISCONTINUED | OUTPATIENT
Start: 2021-08-29 | End: 2021-08-29

## 2021-08-29 RX ORDER — HEPARIN SODIUM 5000 [USP'U]/ML
INJECTION INTRAVENOUS; SUBCUTANEOUS
Qty: 25000 | Refills: 0 | Status: DISCONTINUED | OUTPATIENT
Start: 2021-08-29 | End: 2021-08-30

## 2021-08-29 RX ORDER — HEPARIN SODIUM 5000 [USP'U]/ML
6500 INJECTION INTRAVENOUS; SUBCUTANEOUS EVERY 6 HOURS
Refills: 0 | Status: DISCONTINUED | OUTPATIENT
Start: 2021-08-29 | End: 2021-08-29

## 2021-08-29 RX ORDER — HEPARIN SODIUM 5000 [USP'U]/ML
6500 INJECTION INTRAVENOUS; SUBCUTANEOUS EVERY 6 HOURS
Refills: 0 | Status: DISCONTINUED | OUTPATIENT
Start: 2021-08-29 | End: 2021-08-30

## 2021-08-29 RX ORDER — HEPARIN SODIUM 5000 [USP'U]/ML
3000 INJECTION INTRAVENOUS; SUBCUTANEOUS EVERY 6 HOURS
Refills: 0 | Status: DISCONTINUED | OUTPATIENT
Start: 2021-08-29 | End: 2021-08-30

## 2021-08-29 RX ADMIN — Medication 1 TABLET(S): at 11:43

## 2021-08-29 RX ADMIN — Medication 105 MILLIGRAM(S): at 06:30

## 2021-08-29 RX ADMIN — Medication 1 MILLIGRAM(S): at 11:43

## 2021-08-29 RX ADMIN — SENNA PLUS 2 TABLET(S): 8.6 TABLET ORAL at 23:02

## 2021-08-29 RX ADMIN — Medication 105 MILLIGRAM(S): at 13:08

## 2021-08-29 RX ADMIN — RIVAROXABAN 15 MILLIGRAM(S): KIT at 09:22

## 2021-08-29 RX ADMIN — HEPARIN SODIUM 1400 UNIT(S)/HR: 5000 INJECTION INTRAVENOUS; SUBCUTANEOUS at 23:56

## 2021-08-29 RX ADMIN — Medication 0.5 MILLIGRAM(S): at 16:13

## 2021-08-29 RX ADMIN — HEPARIN SODIUM 1400 UNIT(S)/HR: 5000 INJECTION INTRAVENOUS; SUBCUTANEOUS at 17:36

## 2021-08-29 RX ADMIN — POLYETHYLENE GLYCOL 3350 17 GRAM(S): 17 POWDER, FOR SOLUTION ORAL at 06:30

## 2021-08-29 NOTE — PROGRESS NOTE ADULT - PROBLEM SELECTOR PLAN 1
Acute lower leg pain and swelling with warmth, extensive thrombus on venous US.  Palpable DP pulses, warm, perfused, low concern for limb ischemia  Most likely provoked DVT i/s/o immobility from recent hospitalization  CT Venogram Abd + Pelvis: Thrombus within the infrarenal IVC extends into the left common iliac vein and left external iliac vein to the left common femoral vein.  CT Chest: Bilateral pulmonary emboli.  POD # 1 from LLE-iliocaval DVT thrombectomy.    Plan  -c/w xarelto   -Outpatient heme follow up  -IR planning LLE thrombectomy/ thrombolysis 8/25 Acute lower leg pain and swelling with warmth, extensive thrombus on venous US.  Palpable DP pulses, warm, perfused, low concern for limb ischemia  Most likely provoked DVT i/s/o immobility from recent hospitalization  CT Venogram Abd + Pelvis: Thrombus within the infrarenal IVC extends into the left common iliac vein and left external iliac vein to the left common femoral vein.  CT Chest: Bilateral pulmonary emboli.  POD # 1 from LLE-iliocaval DVT thrombectomy.    Plan  -hold xarelto in setting of possible biopsy in the near future; transition to hep gtt  -Outpatient heme follow up  -IR planning LLE thrombectomy/ thrombolysis 8/25

## 2021-08-29 NOTE — PROGRESS NOTE ADULT - PROBLEM SELECTOR PLAN 6
DVT ppx: xarelto   Diet: Regular (NPO at 4pm 8/29 for MRI abd)  PT and OT recs: TRAN DVT ppx: holding xarelto in setting of possible biopsy; transition to hep gtt  Diet: Regular (NPO at 4pm 8/29 for MRI abd)  PT and OT recs: TRAN

## 2021-08-29 NOTE — PROGRESS NOTE ADULT - PROBLEM SELECTOR PLAN 3
Recent hospitalization for alcohol withdrawal symptoms of seizures and delirium, currently no symptoms or signs of DT  US: early cirrhosis   B12, folate wnl  CT Abd and Pelvis: There are bilobar hypodense hepatic lesions.   The left lobe lesion the subtle area of peripheral enhancement and measures 3.5 x 2.8 cm.  The right lobe lesion measures 1.1 x 1.0 cm.  There is an additional lesion in the caudate lobe of the liver. The lesion measures 1.4 x 1.2 cm.  CIWA scores; 1-2; CIWA discontinued     Plan:   - c/w thiamine  - Folate and multivitamin daily  - home xanax for anxiety  - Hepatology following

## 2021-08-29 NOTE — PROGRESS NOTE ADULT - PROBLEM SELECTOR PLAN 2
CT Chest: Bilateral pulmonary emboli.    Plan  -c/w xarelto CT Chest: Bilateral pulmonary emboli.    Plan  -hold xarelto in setting of possible biopsy in the near future; transition to hep gtt

## 2021-08-29 NOTE — PROGRESS NOTE ADULT - ATTENDING COMMENTS
d/w HS team  71M with PMH of alcohol abuse (recent hospitalization for alcohol withdrawal symptoms of seizures and delirium) presenting with left lower leg swelling and pain, found to have extensive L lower leg DVT  IVC thrombus, and b/l PE  LLE-iliocaval DVT thrombectomy 8/25    heparin switched to xarelto, but considering likely plans for hepatic bx/colonoscopy bx- will need to switch back to heparin  CT findings reviewed- hepatic nodules/massess- noted r/o primary/secondary malignancy   hepatology cs  appreciated  MRI liver/abd, AFP, tumor markers( hx of colon ca first degree relative)    [facility]  [phone]

## 2021-08-29 NOTE — PROGRESS NOTE ADULT - PROBLEM SELECTOR PLAN 5
Total bili 1.5, indirect bili 1.1, thrombocytopenia 97K, Hg 17    Plan:   -Monitor On admission, Total bili 1.5, indirect bili 1.1, thrombocytopenia 97K, Hg 17.  Now improved.    Plan:   -Monitor

## 2021-08-29 NOTE — PROGRESS NOTE ADULT - PROBLEM SELECTOR PLAN 4
CT Abd and Pelvis: There are bilobar hypodense hepatic lesions.   The left lobe lesion the subtle area of peripheral enhancement and measures 3.5 x 2.8 cm.  The right lobe lesion measures 1.1 x 1.0 cm.  There is an additional lesion in the caudate lobe of the liver. The lesion measures 1.4 x 1.2 cm.  CEA: wnl; CA 19-9: neg; acute hepatitis panel neg; LMK neg   alpha-antitrypsin: slightly elevated; ceruloplasmin: elevated     Plan:   - Hepatology following apprec recs  - F/u further hepatology labs  - F/U MRI Abd

## 2021-08-29 NOTE — PROGRESS NOTE ADULT - SUBJECTIVE AND OBJECTIVE BOX
PROGRESS NOTE:   Authored by Dr. Cristopher Galindo MD  Pager [phone] [facility], [address] [address]     Patient is a 71y old  Male who presents with a chief complaint of 71M p/w Left lower extremity swelling (28 Aug 2021 06:44)      SUBJECTIVE / OVERNIGHT EVENTS:  - Overnight, no acute events.  - Today, pt seen and examined at bedside in AM.    ADDITIONAL REVIEW OF SYSTEMS:    MEDICATIONS  (STANDING):  folic acid 1 milliGRAM(s) Oral daily  multivitamin 1 Tablet(s) Oral daily  polyethylene glycol 3350 17 Gram(s) Oral two times a day  rivaroxaban 15 milliGRAM(s) Oral two times a day with meals  senna 2 Tablet(s) Oral at bedtime  thiamine IVPB 500 milliGRAM(s) IV Intermittent three times a day    MEDICATIONS  (PRN):  ALPRAZolam 0.5 milliGRAM(s) Oral two times a day PRN Anxiety      CAPILLARY BLOOD GLUCOSE        I&O's Summary    28 Aug 2021 07:01  -  29 Aug 2021 07:00  --------------------------------------------------------  IN: 540 mL / OUT: 1625 mL / NET: -1085 mL        PHYSICAL EXAM:  Vital Signs Last 24 Hrs  T(C): 37 (29 Aug 2021 04:46), Max: 37 (28 Aug 2021 22:11)  T(F): 98.6 (29 Aug 2021 04:46), Max: 98.6 (28 Aug 2021 22:11)  HR: 68 (29 Aug 2021 04:46) (68 - 80)  BP: 142/80 (29 Aug 2021 04:46) (132/79 - 150/80)  BP(mean): --  RR: 18 (29 Aug 2021 04:46) (18 - 18)  SpO2: 96% (29 Aug 2021 04:46) (94% - 96%)    GENERAL: NAD, well-developed  HEENT: sclera clear  CHEST/LUNG: Clear to auscultation bilaterally; No wheezes or rales  HEART: Regular rate and rhythm; No murmurs, rubs, or gallops  ABDOMEN: Soft, Nontender, Nondistended; Bowel sounds present  EXTREMITIES:  2+ Peripheral Pulses, L>R LE edema (unable to grade edema 2/2 BL leg wraps); +BL LE are wrapped  PSYCH: AAOx3  NEUROLOGY: non-focal  SKIN: No rashes or lesions    LABS:                        15.2   6.08  )-----------( 213      ( 29 Aug 2021 07:19 )             42.5     08-29    133<L>  |  101  |  9   ----------------------------<  147<H>  4.1   |  21<L>  |  0.75    Ca    9.0      29 Aug 2021 07:17  Phos  3.2     08-29  Mg     2.1     08-29    TPro  6.3  /  Alb  3.2<L>  /  TBili  0.8  /  DBili  x   /  AST  22  /  ALT  25  /  AlkPhos  57  08-29    PT/INR - ( 28 Aug 2021 07:10 )   PT: 18.7 sec;   INR: 1.59 ratio                     RADIOLOGY & ADDITIONAL TESTS:  Results Reviewed:   Imaging Personally Reviewed:  Electrocardiogram Personally Reviewed:    COORDINATION OF CARE:  Care Discussed with Consultants/Other Providers [Y/N]:  Prior or Outpatient Records Reviewed [Y/N]:   PROGRESS NOTE:   Authored by Dr. Cristopher Galindo MD  Pager [phone] [facility], [address] [address]     Patient is a 71y old  Male who presents with a chief complaint of 71M p/w Left lower extremity swelling (28 Aug 2021 06:44)      SUBJECTIVE / OVERNIGHT EVENTS:  - Overnight, no acute events.  - Today, pt seen and examined at bedside in AM. Pt reported feeling "fine." Denied f/c/n/v, CP, SOB, abdominal pain, dysuria, hematuria, hematochezia, melena, diarrhea, constipation. Noted by RN to have had a big BM earlier in the day.    MEDICATIONS  (STANDING):  folic acid 1 milliGRAM(s) Oral daily  multivitamin 1 Tablet(s) Oral daily  polyethylene glycol 3350 17 Gram(s) Oral two times a day  rivaroxaban 15 milliGRAM(s) Oral two times a day with meals  senna 2 Tablet(s) Oral at bedtime  thiamine IVPB 500 milliGRAM(s) IV Intermittent three times a day    MEDICATIONS  (PRN):  ALPRAZolam 0.5 milliGRAM(s) Oral two times a day PRN Anxiety      CAPILLARY BLOOD GLUCOSE        I&O's Summary    28 Aug 2021 07:01  -  29 Aug 2021 07:00  --------------------------------------------------------  IN: 540 mL / OUT: 1625 mL / NET: -1085 mL        PHYSICAL EXAM:  Vital Signs Last 24 Hrs  T(C): 37 (29 Aug 2021 04:46), Max: 37 (28 Aug 2021 22:11)  T(F): 98.6 (29 Aug 2021 04:46), Max: 98.6 (28 Aug 2021 22:11)  HR: 68 (29 Aug 2021 04:46) (68 - 80)  BP: 142/80 (29 Aug 2021 04:46) (132/79 - 150/80)  BP(mean): --  RR: 18 (29 Aug 2021 04:46) (18 - 18)  SpO2: 96% (29 Aug 2021 04:46) (94% - 96%)    GENERAL: NAD, well-developed  HEENT: sclera clear  CHEST/LUNG: Clear to auscultation bilaterally; No wheezes or rales  HEART: Regular rate and rhythm; No murmurs, rubs, or gallops  ABDOMEN: Soft, Nontender, Nondistended; Bowel sounds present  EXTREMITIES:  2+ Peripheral Pulses, L>R LE edema (unable to grade edema 2/2 BL leg wraps); +BL LE are wrapped  PSYCH: AAOx3  NEUROLOGY: non-focal  SKIN: No rashes or lesions    LABS:                        15.2   6.08  )-----------( 213      ( 29 Aug 2021 07:19 )             42.5     08-29    133<L>  |  101  |  9   ----------------------------<  147<H>  4.1   |  21<L>  |  0.75    Ca    9.0      29 Aug 2021 07:17  Phos  3.2     08-29  Mg     2.1     08-29    TPro  6.3  /  Alb  3.2<L>  /  TBili  0.8  /  DBili  x   /  AST  22  /  ALT  25  /  AlkPhos  57  08-29    PT/INR - ( 28 Aug 2021 07:10 )   PT: 18.7 sec;   INR: 1.59 ratio                     RADIOLOGY & ADDITIONAL TESTS:  Results Reviewed:   Imaging Personally Reviewed:  Electrocardiogram Personally Reviewed:    COORDINATION OF CARE:  Care Discussed with Consultants/Other Providers [Y/N]:  Prior or Outpatient Records Reviewed [Y/N]:

## 2021-08-30 LAB
ALBUMIN SERPL ELPH-MCNC: 3.4 G/DL — SIGNIFICANT CHANGE UP (ref 3.3–5)
ALP SERPL-CCNC: 62 U/L — SIGNIFICANT CHANGE UP (ref 40–120)
ALT FLD-CCNC: 30 U/L — SIGNIFICANT CHANGE UP (ref 10–45)
ANION GAP SERPL CALC-SCNC: 15 MMOL/L — SIGNIFICANT CHANGE UP (ref 5–17)
APTT BLD: 41.4 SEC — HIGH (ref 27.5–35.5)
AST SERPL-CCNC: 27 U/L — SIGNIFICANT CHANGE UP (ref 10–40)
BILIRUB SERPL-MCNC: 0.8 MG/DL — SIGNIFICANT CHANGE UP (ref 0.2–1.2)
BUN SERPL-MCNC: 10 MG/DL — SIGNIFICANT CHANGE UP (ref 7–23)
CALCIUM SERPL-MCNC: 9.3 MG/DL — SIGNIFICANT CHANGE UP (ref 8.4–10.5)
CHLORIDE SERPL-SCNC: 100 MMOL/L — SIGNIFICANT CHANGE UP (ref 96–108)
CO2 SERPL-SCNC: 20 MMOL/L — LOW (ref 22–31)
CREAT SERPL-MCNC: 0.75 MG/DL — SIGNIFICANT CHANGE UP (ref 0.5–1.3)
GLUCOSE SERPL-MCNC: 120 MG/DL — HIGH (ref 70–99)
HCT VFR BLD CALC: 43.5 % — SIGNIFICANT CHANGE UP (ref 39–50)
HGB BLD-MCNC: 15.4 G/DL — SIGNIFICANT CHANGE UP (ref 13–17)
MAGNESIUM SERPL-MCNC: 2.2 MG/DL — SIGNIFICANT CHANGE UP (ref 1.6–2.6)
MCHC RBC-ENTMCNC: 34.6 PG — HIGH (ref 27–34)
MCHC RBC-ENTMCNC: 35.4 GM/DL — SIGNIFICANT CHANGE UP (ref 32–36)
MCV RBC AUTO: 97.8 FL — SIGNIFICANT CHANGE UP (ref 80–100)
NRBC # BLD: 0 /100 WBCS — SIGNIFICANT CHANGE UP (ref 0–0)
PHOSPHATE SERPL-MCNC: 3.3 MG/DL — SIGNIFICANT CHANGE UP (ref 2.5–4.5)
PLATELET # BLD AUTO: 247 K/UL — SIGNIFICANT CHANGE UP (ref 150–400)
POTASSIUM SERPL-MCNC: 4.4 MMOL/L — SIGNIFICANT CHANGE UP (ref 3.5–5.3)
POTASSIUM SERPL-SCNC: 4.4 MMOL/L — SIGNIFICANT CHANGE UP (ref 3.5–5.3)
PROT SERPL-MCNC: 6.6 G/DL — SIGNIFICANT CHANGE UP (ref 6–8.3)
RBC # BLD: 4.45 M/UL — SIGNIFICANT CHANGE UP (ref 4.2–5.8)
RBC # FLD: 11.9 % — SIGNIFICANT CHANGE UP (ref 10.3–14.5)
SARS-COV-2 RNA SPEC QL NAA+PROBE: SIGNIFICANT CHANGE UP
SODIUM SERPL-SCNC: 135 MMOL/L — SIGNIFICANT CHANGE UP (ref 135–145)
WBC # BLD: 6.5 K/UL — SIGNIFICANT CHANGE UP (ref 3.8–10.5)
WBC # FLD AUTO: 6.5 K/UL — SIGNIFICANT CHANGE UP (ref 3.8–10.5)

## 2021-08-30 PROCEDURE — 99233 SBSQ HOSP IP/OBS HIGH 50: CPT

## 2021-08-30 PROCEDURE — 99232 SBSQ HOSP IP/OBS MODERATE 35: CPT | Mod: GC

## 2021-08-30 RX ORDER — RIVAROXABAN 15 MG-20MG
15 KIT ORAL
Refills: 0 | Status: DISCONTINUED | OUTPATIENT
Start: 2021-08-30 | End: 2021-09-01

## 2021-08-30 RX ADMIN — Medication 1 MILLIGRAM(S): at 12:51

## 2021-08-30 RX ADMIN — RIVAROXABAN 15 MILLIGRAM(S): KIT at 14:13

## 2021-08-30 RX ADMIN — RIVAROXABAN 15 MILLIGRAM(S): KIT at 21:50

## 2021-08-30 RX ADMIN — HEPARIN SODIUM 1600 UNIT(S)/HR: 5000 INJECTION INTRAVENOUS; SUBCUTANEOUS at 09:26

## 2021-08-30 RX ADMIN — Medication 1 TABLET(S): at 12:51

## 2021-08-30 RX ADMIN — POLYETHYLENE GLYCOL 3350 17 GRAM(S): 17 POWDER, FOR SOLUTION ORAL at 05:29

## 2021-08-30 RX ADMIN — Medication 1 MILLIGRAM(S): at 13:37

## 2021-08-30 RX ADMIN — HEPARIN SODIUM 3000 UNIT(S): 5000 INJECTION INTRAVENOUS; SUBCUTANEOUS at 09:28

## 2021-08-30 NOTE — PROGRESS NOTE ADULT - ASSESSMENT
Assessment:  #Extensive LLE DVT consistent with PHLEGMASIA s/p successful thrombectomy 8/25/21  -symptomatic improvement   #Bilateral pulmonary embolism       Plan:  - Continue therapeutic anticoagulation, currently on heparin gtt pending possible liver biopsy  - Encourage ambulation   - Work-up of hepatic lesions per primary team and hepatology   - No further inpatient vascular workup, discharge planning when appropriate      NATALIA Michelle MD  Vascular Cardiology Fellow  [phone]  All cardiology service information may be found 24/7 on [email or web address], password: [email or web address]   Assessment:  #Extensive LLE DVT with phlegmasia  - Unprovoked  - s/p successful IR thrombectomy 8/25/21  #Bilateral pulmonary embolism   #Hepatic lesions  #EtOH use disorder    Plan:  - Continue therapeutic anticoagulation, currently on heparin gtt pending possible liver biopsy  - Please check echocardiogram  - Encourage ambulation   - Work-up of hepatic lesions per primary team and hepatology   - No further inpatient vascular workup, discharge planning when appropriate      NATALIA Michelle MD  Vascular Cardiology Fellow  [phone]  All cardiology service information may be found 24/7 on [email or web address], password: [email or web address]   Assessment:  #Extensive LLE DVT with phlegmasia  - Unprovoked  - s/p successful IR thrombectomy 8/25/21  #Bilateral pulmonary embolism   #Hepatic lesions  #EtOH use disorder    Plan:  - Continue therapeutic anticoagulation, currently on heparin gtt pending possible liver biopsy  - If no additional procedures planned, recommend Xarelto 15 mg BID x21 days, followed by Xarelto 20 mg daily  - Please check echocardiogram  - Encourage ambulation   - Work-up of hepatic lesions per primary team and hepatology   - No further inpatient vascular workup, discharge planning when appropriate. Our office will call him to set up an follow-up appt      NATALIA Michelle MD  Vascular Cardiology Fellow  [phone]  All cardiology service information may be found 24/7 on [email or web address], password: [email or web address]

## 2021-08-30 NOTE — PROGRESS NOTE ADULT - SUBJECTIVE AND OBJECTIVE BOX
Chief Complaint:  Patient is a 71y old  Male who presents with a chief complaint of 71M p/w Left lower extremity swelling (30 Aug 2021 06:38)      Interval Events:       Hospital Medications:  ALPRAZolam 0.5 milliGRAM(s) Oral two times a day PRN  folic acid 1 milliGRAM(s) Oral daily  heparin   Injectable 6500 Unit(s) IV Push every 6 hours PRN  heparin   Injectable 3000 Unit(s) IV Push every 6 hours PRN  heparin  Infusion.  Unit(s)/Hr IV Continuous <Continuous>  multivitamin 1 Tablet(s) Oral daily  polyethylene glycol 3350 17 Gram(s) Oral two times a day  senna 2 Tablet(s) Oral at bedtime      PMHX/PSHX:  Alcohol abuse    History of alcohol use    No significant past surgical history          ROS: 14 point ROS negative unless otherwise stated in subjective      PHYSICAL EXAM:     GENERAL:  Appears stated age, chronically ill appearing, in no distress  HEENT:  NC/AT,  conjunctivae clear and pink  CHEST:  Full & symmetric excursion, no increased effort, breath sounds clear anteriorly  HEART:  Regular rhythm, S1, S2, no murmur/rub/S3/S4  ABDOMEN:  Soft, non-tender, +central adiposity, non-distended, +bowel sounds, no palpable masses  EXTREMITIES:  L>R LE edema (unable to grade edema 2/2 BL leg wraps); +BL LE are wrapped  SKIN:  warm, dry; no spider angiomas or palmar erythema  NEURO:  Alert, orientedx2 (person, knows he is in a hospital but unsure of hospital name, oriented to year but not month or day); no asterixis    Vital Signs:  Vital Signs Last 24 Hrs  T(C): 36.7 (30 Aug 2021 04:52), Max: 37.1 (29 Aug 2021 13:54)  T(F): 98 (30 Aug 2021 04:52), Max: 98.8 (29 Aug 2021 13:54)  HR: 70 (30 Aug 2021 04:52) (70 - 84)  BP: 152/81 (30 Aug 2021 04:52) (139/79 - 152/81)  BP(mean): --  RR: 18 (30 Aug 2021 04:52) (18 - 18)  SpO2: 97% (30 Aug 2021 04:52) (95% - 98%)  Daily     Daily     LABS:                        15.4   6.50  )-----------( 247      ( 30 Aug 2021 07:24 )             43.5     08-30    135  |  100  |  10  ----------------------------<  120<H>  4.4   |  20<L>  |  0.75    Ca    9.3      30 Aug 2021 07:24  Phos  3.3     08-30  Mg     2.2     08-30    TPro  6.6  /  Alb  3.4  /  TBili  0.8  /  DBili  x   /  AST  27  /  ALT  30  /  AlkPhos  62  08-30    LIVER FUNCTIONS - ( 30 Aug 2021 07:24 )  Alb: 3.4 g/dL / Pro: 6.6 g/dL / ALK PHOS: 62 U/L / ALT: 30 U/L / AST: 27 U/L / GGT: x           PT/INR - ( 29 Aug 2021 14:04 )   PT: 19.9 sec;   INR: 1.70 ratio         PTT - ( 29 Aug 2021 14:04 )  PTT:34.7 sec        Imaging:             Chief Complaint:  Patient is a 71y old  Male who presents with a chief complaint of 71M p/w Left lower extremity swelling (30 Aug 2021 06:38)      Interval Events: Patient reports he feels well this AM. Denies any abdominal pain, N/V/D/C.      Hospital Medications:  ALPRAZolam 0.5 milliGRAM(s) Oral two times a day PRN  folic acid 1 milliGRAM(s) Oral daily  heparin   Injectable 6500 Unit(s) IV Push every 6 hours PRN  heparin   Injectable 3000 Unit(s) IV Push every 6 hours PRN  heparin  Infusion.  Unit(s)/Hr IV Continuous <Continuous>  multivitamin 1 Tablet(s) Oral daily  polyethylene glycol 3350 17 Gram(s) Oral two times a day  senna 2 Tablet(s) Oral at bedtime      PMHX/PSHX:  Alcohol abuse    History of alcohol use    No significant past surgical history          ROS: 14 point ROS negative unless otherwise stated in subjective      PHYSICAL EXAM:     GENERAL:  Appears stated age, chronically ill appearing, in no distress  HEENT:  NC/AT,  conjunctivae clear and pink  CHEST:  Full & symmetric excursion, no increased effort, breath sounds clear anteriorly  HEART:  Regular rhythm, S1, S2, no murmur/rub/S3/S4  ABDOMEN:  Soft, non-tender, +central adiposity, non-distended, +bowel sounds, no palpable masses  EXTREMITIES:  L>R LE edema (unable to grade edema 2/2 BL leg wraps); +BL LE are wrapped  SKIN:  warm, dry; no spider angiomas or palmar erythema  NEURO:  Alert, orientedx2 (person, knows he is in a hospital but unsure of hospital name, oriented to year but not month or day); no asterixis    Vital Signs:  Vital Signs Last 24 Hrs  T(C): 36.7 (30 Aug 2021 04:52), Max: 37.1 (29 Aug 2021 13:54)  T(F): 98 (30 Aug 2021 04:52), Max: 98.8 (29 Aug 2021 13:54)  HR: 70 (30 Aug 2021 04:52) (70 - 84)  BP: 152/81 (30 Aug 2021 04:52) (139/79 - 152/81)  BP(mean): --  RR: 18 (30 Aug 2021 04:52) (18 - 18)  SpO2: 97% (30 Aug 2021 04:52) (95% - 98%)  Daily     Daily     LABS:                        15.4   6.50  )-----------( 247      ( 30 Aug 2021 07:24 )             43.5     08-30    135  |  100  |  10  ----------------------------<  120<H>  4.4   |  20<L>  |  0.75    Ca    9.3      30 Aug 2021 07:24  Phos  3.3     08-30  Mg     2.2     08-30    TPro  6.6  /  Alb  3.4  /  TBili  0.8  /  DBili  x   /  AST  27  /  ALT  30  /  AlkPhos  62  08-30    LIVER FUNCTIONS - ( 30 Aug 2021 07:24 )  Alb: 3.4 g/dL / Pro: 6.6 g/dL / ALK PHOS: 62 U/L / ALT: 30 U/L / AST: 27 U/L / GGT: x           PT/INR - ( 29 Aug 2021 14:04 )   PT: 19.9 sec;   INR: 1.70 ratio         PTT - ( 29 Aug 2021 14:04 )  PTT:34.7 sec        Imaging:      EXAM:  MR ABDOMEN WAW                             PROCEDURE DATE:  08/29/2021            INTERPRETATION:  CLINICAL INFORMATION: Alcohol use disorder with incidental liver lesions on CT. Acute pulmonary embolism with iliocaval venous thrombosis s/p mechanical thrombectomy 8/25.    COMPARISON: No prior MRI. CT chest, abdomen and pelvis 8/24/2021, CT coronary 12/20/2011    CONTRAST/COMPLICATIONS:  IV Contrast: Gadavist  10 cc administered   0 cc discarded  Oral Contrast: NONE  Complications: None reported at time of study completion    PROCEDURE:  MRI of the abdomen was performed.  MRCP was performed.    FINDINGS:  LOWER CHEST: Bilateral symmetric gynecomastia. Redemonstration of a right lower lobe region of pulmonary infarct as at recent CT.    LIVER: Normal morphology. Mild diffuse steatosis. Two left hepatic lobe lesions with the largest measuring 3.8 x 2.3 cm stable to decreased in size from prior cardiac CT in December 2011 and most consistent with a hemangioma. A 1.8 x 1.2 cm lesion in the caudate lobe and a 1.0 x 1.0 cm lesion in segment 7 demonstrates T2 signal hyperintensity with a similar enhancement pattern also likely to represent hemangiomas.  BILE DUCTS: Normal caliber.  GALLBLADDER: Within normal limits.  SPLEEN: Within normal limits.  PANCREAS: Within normal limits.  ADRENALS: Within normal limits.  KIDNEYS/URETERS: Bilateral parapelvic cysts.    VISUALIZED PORTIONS:  BOWEL: Small hiatal hernia. Within normal limits.  PERITONEUM: No ascites.  VESSELS: Status post interval IVC thrombectomy. Visualized portions of the IVC are widely patent.  RETROPERITONEUM/LYMPH NODES: No lymphadenopathy.  ABDOMINAL WALL: Within normal limits.  BONES: Within normal limits.    IMPRESSION:    Normal liver morphology with mild diffuse steatosis. Hepatic lesions as above, likely hemangiomas.

## 2021-08-30 NOTE — DIETITIAN INITIAL EVALUATION ADULT. - CHIEF COMPLAINT
extensive L lower leg DVT "most likely i/s/o immobilization from recent hospitalization"  pending workup of incidentally found liver lesions

## 2021-08-30 NOTE — PROGRESS NOTE ADULT - PROBLEM SELECTOR PLAN 1
Acute lower leg pain and swelling with warmth, extensive thrombus on venous US.  Palpable DP pulses, warm, perfused, low concern for limb ischemia  Most likely provoked DVT i/s/o immobility from recent hospitalization  CT Venogram Abd + Pelvis: Thrombus within the infrarenal IVC extends into the left common iliac vein and left external iliac vein to the left common femoral vein.  CT Chest: Bilateral pulmonary emboli.  POD # 1 from LLE-iliocaval DVT thrombectomy.    Plan  -hold xarelto in setting of possible biopsy in the near future; transition to hep gtt  -Outpatient heme follow up  -IR planning LLE thrombectomy/ thrombolysis 8/25

## 2021-08-30 NOTE — PROGRESS NOTE ADULT - ASSESSMENT
71M with PMH of alcohol use disorder (recent hospitalization for alcohol withdrawal symptoms of seizures and delirium) presenting with left lower leg swelling and pain, found to have extensive L lower leg DVT most likely i/s/o immobilization from recent hospitalization. Currently no sx of DT.   71M with PMH of alcohol use disorder (recent hospitalization for alcohol withdrawal symptoms of seizures and delirium) presenting with left lower leg swelling and pain, found to have extensive L lower leg DVT most likely i/s/o immobilization from recent hospitalization. Currently no sx of DT. Pending workup of incidentally found liver lesions

## 2021-08-30 NOTE — PROGRESS NOTE ADULT - SUBJECTIVE AND OBJECTIVE BOX
*****************************************  Camila London, PGY1  Internal Medicine  Pager NS: [phone]  *****************************************      Patient is a 71y old  Male who presents with a chief complaint of 71M p/w Left lower extremity swelling (29 Aug 2021 08:01)      INTERVAL HPI/OVERNIGHT EVENTS:       SUBJECTIVE :        MEDICATIONS  (STANDING):  folic acid 1 milliGRAM(s) Oral daily  heparin  Infusion.  Unit(s)/Hr (14 mL/Hr) IV Continuous <Continuous>  multivitamin 1 Tablet(s) Oral daily  polyethylene glycol 3350 17 Gram(s) Oral two times a day  senna 2 Tablet(s) Oral at bedtime    MEDICATIONS  (PRN):  ALPRAZolam 0.5 milliGRAM(s) Oral two times a day PRN Anxiety  heparin   Injectable 6500 Unit(s) IV Push every 6 hours PRN For aPTT less than 40  heparin   Injectable 3000 Unit(s) IV Push every 6 hours PRN For aPTT between 40 - 57      CAPILLARY BLOOD GLUCOSE      POCT Blood Glucose.: 112 mg/dL (29 Aug 2021 17:46)    I&O's Summary    28 Aug 2021 07:01  -  29 Aug 2021 07:00  --------------------------------------------------------  IN: 540 mL / OUT: 1625 mL / NET: -1085 mL    29 Aug 2021 07:01  -  30 Aug 2021 06:39  --------------------------------------------------------  IN: 21 mL / OUT: 1200 mL / NET: -1179 mL        PHYSICAL EXAM:  Vital Signs Last 24 Hrs  T(C): 36.7 (30 Aug 2021 04:52), Max: 37.1 (29 Aug 2021 13:54)  T(F): 98 (30 Aug 2021 04:52), Max: 98.8 (29 Aug 2021 13:54)  HR: 70 (30 Aug 2021 04:52) (70 - 84)  BP: 152/81 (30 Aug 2021 04:52) (139/79 - 152/81)  BP(mean): --  RR: 18 (30 Aug 2021 04:52) (18 - 18)  SpO2: 97% (30 Aug 2021 04:52) (95% - 98%)    GENERAL: NAD, well-developed  HEENT: sclera clear  CHEST/LUNG: Clear to auscultation bilaterally; No wheezes or rales  HEART: Regular rate and rhythm; No murmurs, rubs, or gallops  ABDOMEN: Soft, Nontender, Nondistended; Bowel sounds present  EXTREMITIES:  2+ Peripheral Pulses, L>R LE edema (unable to grade edema 2/2 BL leg wraps); +BL LE are wrapped  PSYCH: AAOx3  NEUROLOGY: non-focal  SKIN: No rashes or lesions    LABS:                        15.2   6.08  )-----------( 213      ( 29 Aug 2021 07:19 )             42.5     08-29    133<L>  |  101  |  9   ----------------------------<  147<H>  4.1   |  21<L>  |  0.75    Ca    9.0      29 Aug 2021 07:17  Phos  3.2     08-29  Mg     2.1     08-29    TPro  6.3  /  Alb  3.2<L>  /  TBili  0.8  /  DBili  x   /  AST  22  /  ALT  25  /  AlkPhos  57  08-29    PT/INR - ( 29 Aug 2021 14:04 )   PT: 19.9 sec;   INR: 1.70 ratio         PTT - ( 29 Aug 2021 14:04 )  PTT:34.7 sec            RADIOLOGY & ADDITIONAL TESTS:  Results Reviewed:   Imaging Personally Reviewed:  Electrocardiogram Personally Reviewed:    COORDINATION OF CARE:  Care Discussed with Consultants/Other Providers [Y/N]:  Prior or Outpatient Records Reviewed [Y/N]:   *****************************************  Camila London, PGY1  Internal Medicine  Pager NS: [phone]  *****************************************      Patient is a 71y old  Male who presents with a chief complaint of 71M p/w Left lower extremity swelling (29 Aug 2021 08:01)        SUBJECTIVE : NAEO. Pt seen and examined at bedside. Notes that he wants to be transferred if further workup is needed regarding liver lesions. Denies any pain.    Denies CP, SOB, fever/chills, dysuria, hematuria, diarrhea/constipation.     MEDICATIONS  (STANDING):  folic acid 1 milliGRAM(s) Oral daily  heparin  Infusion.  Unit(s)/Hr (14 mL/Hr) IV Continuous <Continuous>  multivitamin 1 Tablet(s) Oral daily  polyethylene glycol 3350 17 Gram(s) Oral two times a day  senna 2 Tablet(s) Oral at bedtime    MEDICATIONS  (PRN):  ALPRAZolam 0.5 milliGRAM(s) Oral two times a day PRN Anxiety  heparin   Injectable 6500 Unit(s) IV Push every 6 hours PRN For aPTT less than 40  heparin   Injectable 3000 Unit(s) IV Push every 6 hours PRN For aPTT between 40 - 57      CAPILLARY BLOOD GLUCOSE      POCT Blood Glucose.: 112 mg/dL (29 Aug 2021 17:46)    I&O's Summary    28 Aug 2021 07:01  -  29 Aug 2021 07:00  --------------------------------------------------------  IN: 540 mL / OUT: 1625 mL / NET: -1085 mL    29 Aug 2021 07:01  -  30 Aug 2021 06:39  --------------------------------------------------------  IN: 21 mL / OUT: 1200 mL / NET: -1179 mL        PHYSICAL EXAM:  Vital Signs Last 24 Hrs  T(C): 36.7 (30 Aug 2021 04:52), Max: 37.1 (29 Aug 2021 13:54)  T(F): 98 (30 Aug 2021 04:52), Max: 98.8 (29 Aug 2021 13:54)  HR: 70 (30 Aug 2021 04:52) (70 - 84)  BP: 152/81 (30 Aug 2021 04:52) (139/79 - 152/81)  BP(mean): --  RR: 18 (30 Aug 2021 04:52) (18 - 18)  SpO2: 97% (30 Aug 2021 04:52) (95% - 98%)    GENERAL: NAD, well-developed  HEENT: sclera clear  CHEST/LUNG: Clear to auscultation bilaterally; No wheezes or rales  HEART: Regular rate and rhythm; No murmurs, rubs, or gallops  ABDOMEN: Soft, Nontender, Nondistended; Bowel sounds present  EXTREMITIES:  2+ Peripheral Pulses, +BL LE are wrapped  PSYCH: AAOx3  NEUROLOGY: non-focal  SKIN: No rashes or lesions    LABS:                        15.2   6.08  )-----------( 213      ( 29 Aug 2021 07:19 )             42.5     08-29    133<L>  |  101  |  9   ----------------------------<  147<H>  4.1   |  21<L>  |  0.75    Ca    9.0      29 Aug 2021 07:17  Phos  3.2     08-29  Mg     2.1     08-29    TPro  6.3  /  Alb  3.2<L>  /  TBili  0.8  /  DBili  x   /  AST  22  /  ALT  25  /  AlkPhos  57  08-29    PT/INR - ( 29 Aug 2021 14:04 )   PT: 19.9 sec;   INR: 1.70 ratio         PTT - ( 29 Aug 2021 14:04 )  PTT:34.7 sec            RADIOLOGY & ADDITIONAL TESTS:  Results Reviewed: Y  Imaging Personally Reviewed:  Electrocardiogram Personally Reviewed:    COORDINATION OF CARE:  Care Discussed with Consultants/Other Providers [Y/N]:  Prior or Outpatient Records Reviewed [Y/N]:

## 2021-08-30 NOTE — DIETITIAN INITIAL EVALUATION ADULT. - OTHER INFO
Pt noted to be confused, disoriented - question accuracy of limited information obtained.    Pt reports poor appetite and PO intake in-house, consuming <50% of meals. Denies nausea/vomiting/diarrhea/constipation. Reports last BM 2 days ago; of note, last documented BM yesterday 8/29. Ordered for Miralax, senna - will continue to monitor GI status. Pt denies difficulties chewing or swallowing. Confirmed NKFA.    Pt unsure of UBW. [facility] Dosing weight this admission 80.4 kg suggests weight gain. Edema noted, likely masking lower wt closer to 7/2020 wt. Will continue to monitor and trend weights.    Encouraged PO intake as tolerated and stressed importance of adequate calorie and protein intake. Pt amenable to RD provision of Mighty Shakes in-house to optimize intake, however requesting diet Mighty Shakes at this time (requesting lower sugar option). Further diet education deferred at this time as pt with poor PO intake and confused. Pt with no nutrition-related questions or concerns at this time. Made aware RD remains available.

## 2021-08-30 NOTE — PROGRESS NOTE ADULT - PROBLEM SELECTOR PLAN 5
On admission, Total bili 1.5, indirect bili 1.1, thrombocytopenia 97K, Hg 17.  Now improved.    Plan:   -Monitor

## 2021-08-30 NOTE — PROGRESS NOTE ADULT - SUBJECTIVE AND OBJECTIVE BOX
Vascular Cardiology  Progress note    SERVICE LINE [phone]     [email or web address]     CC:  L leg swelling    INTERVAL HISTORY:     in progress      Allergies  No Known Allergies      MEDICATIONS:  heparin   Injectable 6500 Unit(s) IV Push every 6 hours PRN  heparin   Injectable 3000 Unit(s) IV Push every 6 hours PRN  heparin  Infusion.  Unit(s)/Hr IV Continuous <Continuous>  ALPRAZolam 0.5 milliGRAM(s) Oral two times a day PRN  polyethylene glycol 3350 17 Gram(s) Oral two times a day  senna 2 Tablet(s) Oral at bedtime  folic acid 1 milliGRAM(s) Oral daily  multivitamin 1 Tablet(s) Oral daily      PAST MEDICAL & SURGICAL HISTORY:  History of alcohol use    No significant past surgical history        FAMILY HISTORY:  No pertinent family history in first degree relatives        SOCIAL HISTORY:  unchanged    REVIEW OF SYSTEMS:  CONSTITUTIONAL: No fever, weight loss, or fatigue  EYES: No eye pain, visual disturbances, or discharge  ENMT:  No difficulty hearing, tinnitus, vertigo; No sinus or throat pain  NECK: No pain or stiffness  RESPIRATORY: No cough, wheezing, chills or hemoptysis; No Shortness of Breath  CARDIOVASCULAR: No chest pain, palpitations, passing out, dizziness, or leg swelling  GASTROINTESTINAL: No abdominal or epigastric pain. No nausea, vomiting, or hematemesis; No diarrhea or constipation. No melena or hematochezia.  GENITOURINARY: No dysuria, frequency, hematuria, or incontinence  NEUROLOGICAL: No headaches, memory loss, loss of strength, numbness, or tremors  SKIN: No itching, burning, rashes, or lesions   LYMPH Nodes: No enlarged glands  ENDOCRINE: No heat or cold intolerance; No hair loss  MUSCULOSKELETAL: No joint pain or swelling; No muscle, back, or extremity pain  PSYCHIATRIC: No depression, anxiety, mood swings, or difficulty sleeping  HEME/LYMPH: No easy bruising, or bleeding gums  ALLERY AND IMMUNOLOGIC: No hives or eczema	    [ x] All others negative	  [ ] Unable to obtain    PHYSICAL EXAM:  T(C): 36.7 (08-30-21 @ 04:52), Max: 37.1 (08-29-21 @ 13:54)  HR: 70 (08-30-21 @ 04:52) (70 - 84)  BP: 152/81 (08-30-21 @ 04:52) (139/79 - 152/81)  RR: 18 (08-30-21 @ 04:52) (18 - 18)  SpO2: 97% (08-30-21 @ 04:52) (95% - 98%)  Wt(kg): --  I&O's Summary    29 Aug 2021 07:01  -  30 Aug 2021 07:00  --------------------------------------------------------  IN: 21 mL / OUT: 1200 mL / NET: -1179 mL        Appearance: Normal	  HEENT:   Normal oral mucosa, PERRL, EOMI	  Carotid:  Right: No bruit    Left:  No bruit  Lymphatic: No lymphadenopathy  Cardiovascular: Normal S1 S2, No JVD, No murmurs, No edema  Respiratory: Lungs clear to auscultation	  Psychiatry: A & O x 3, Mood & affect appropriate  Gastrointestinal:  Soft, Non-tender, + BS	  Skin: No rashes, No ecchymoses, No cyanosis	  Neurologic: Non-focal  Extremities: Normal range of motion, No clubbing, cyanosis.  Vascular:   Right Femoral:  Palpable   Left Femoral:  Palpable  Right Popliteal: Palpable   Left Popliteal:  palpable  Right DP:  Palpable             Left DP:  Palpable  Right PT:  Palpable              Left PT:  Palpable      LABS:	 	    CBC Full  -  ( 30 Aug 2021 07:24 )  WBC Count : 6.50 K/uL  Hemoglobin : 15.4 g/dL  Hematocrit : 43.5 %  Platelet Count - Automated : 247 K/uL  Mean Cell Volume : 97.8 fl  Mean Cell Hemoglobin : 34.6 pg  Mean Cell Hemoglobin Concentration : 35.4 gm/dL  Auto Neutrophil # : x  Auto Lymphocyte # : x  Auto Monocyte # : x  Auto Eosinophil # : x  Auto Basophil # : x  Auto Neutrophil % : x  Auto Lymphocyte % : x  Auto Monocyte % : x  Auto Eosinophil % : x  Auto Basophil % : x    08-30    135  |  100  |  10  ----------------------------<  120<H>  4.4   |  20<L>  |  0.75  08-29    133<L>  |  101  |  9   ----------------------------<  147<H>  4.1   |  21<L>  |  0.75    Ca    9.3      30 Aug 2021 07:24  Ca    9.0      29 Aug 2021 07:17  Phos  3.3     08-30  Phos  3.2     08-29  Mg     2.2     08-30  Mg     2.1     08-29    TPro  6.6  /  Alb  3.4  /  TBili  0.8  /  DBili  x   /  AST  27  /  ALT  30  /  AlkPhos  62  08-30  TPro  6.3  /  Alb  3.2<L>  /  TBili  0.8  /  DBili  x   /  AST  22  /  ALT  25  /  AlkPhos  57  08-29               Vascular Cardiology  Progress note    SERVICE LINE [phone]     [email or web address]     CC:  L leg swelling    INTERVAL HISTORY:    Switched back to heparin gtt for possible procedures.       Allergies  No Known Allergies      MEDICATIONS:  heparin   Injectable 6500 Unit(s) IV Push every 6 hours PRN  heparin   Injectable 3000 Unit(s) IV Push every 6 hours PRN  heparin  Infusion.  Unit(s)/Hr IV Continuous <Continuous>  ALPRAZolam 0.5 milliGRAM(s) Oral two times a day PRN  polyethylene glycol 3350 17 Gram(s) Oral two times a day  senna 2 Tablet(s) Oral at bedtime  folic acid 1 milliGRAM(s) Oral daily  multivitamin 1 Tablet(s) Oral daily      PAST MEDICAL & SURGICAL HISTORY:  History of alcohol use    No significant past surgical history        FAMILY HISTORY:  No pertinent family history in first degree relatives        SOCIAL HISTORY:  unchanged    REVIEW OF SYSTEMS:  CONSTITUTIONAL: No fever, weight loss, or fatigue  EYES: No eye pain, visual disturbances, or discharge  ENMT:  No difficulty hearing, tinnitus, vertigo; No sinus or throat pain  NECK: No pain or stiffness  RESPIRATORY: No cough, wheezing, chills or hemoptysis; No Shortness of Breath  CARDIOVASCULAR: No chest pain, palpitations, passing out, dizziness, or leg swelling  GASTROINTESTINAL: No abdominal or epigastric pain. No nausea, vomiting, or hematemesis; No diarrhea or constipation. No melena or hematochezia.  GENITOURINARY: No dysuria, frequency, hematuria, or incontinence  NEUROLOGICAL: No headaches, memory loss, loss of strength, numbness, or tremors  SKIN: No itching, burning, rashes, or lesions   LYMPH Nodes: No enlarged glands  ENDOCRINE: No heat or cold intolerance; No hair loss  MUSCULOSKELETAL: No joint pain or swelling; No muscle, back, or extremity pain  PSYCHIATRIC: No depression, anxiety, mood swings, or difficulty sleeping  HEME/LYMPH: No easy bruising, or bleeding gums  ALLERY AND IMMUNOLOGIC: No hives or eczema	    [ x] All others negative	  [ ] Unable to obtain    PHYSICAL EXAM:  T(C): 36.7 (08-30-21 @ 04:52), Max: 37.1 (08-29-21 @ 13:54)  HR: 70 (08-30-21 @ 04:52) (70 - 84)  BP: 152/81 (08-30-21 @ 04:52) (139/79 - 152/81)  RR: 18 (08-30-21 @ 04:52) (18 - 18)  SpO2: 97% (08-30-21 @ 04:52) (95% - 98%)  Wt(kg): --  I&O's Summary    29 Aug 2021 07:01  -  30 Aug 2021 07:00  --------------------------------------------------------  IN: 21 mL / OUT: 1200 mL / NET: -1179 mL        Appearance: Normal	  HEENT:   Normal oral mucosa, PERRL, EOMI	  Carotid:  Right: No bruit    Left:  No bruit  Lymphatic: No lymphadenopathy  Cardiovascular: Normal S1 S2, No JVD, No murmurs, No edema  Respiratory: Lungs clear to auscultation	  Psychiatry: A & O x 3, Mood & affect appropriate  Gastrointestinal:  Soft, Non-tender, + BS	  Skin: No rashes, No ecchymoses, No cyanosis	  Neurologic: Non-focal  Extremities: Normal range of motion, No clubbing, cyanosis.  Vascular:   Right Femoral:  Palpable   Left Femoral:  Palpable  Right Popliteal: Palpable   Left Popliteal:  palpable  Right DP:  Palpable             Left DP:  Palpable  Right PT:  Palpable              Left PT:  Palpable      LABS:	 	    CBC Full  -  ( 30 Aug 2021 07:24 )  WBC Count : 6.50 K/uL  Hemoglobin : 15.4 g/dL  Hematocrit : 43.5 %  Platelet Count - Automated : 247 K/uL  Mean Cell Volume : 97.8 fl  Mean Cell Hemoglobin : 34.6 pg  Mean Cell Hemoglobin Concentration : 35.4 gm/dL  Auto Neutrophil # : x  Auto Lymphocyte # : x  Auto Monocyte # : x  Auto Eosinophil # : x  Auto Basophil # : x  Auto Neutrophil % : x  Auto Lymphocyte % : x  Auto Monocyte % : x  Auto Eosinophil % : x  Auto Basophil % : x    08-30    135  |  100  |  10  ----------------------------<  120<H>  4.4   |  20<L>  |  0.75  08-29    133<L>  |  101  |  9   ----------------------------<  147<H>  4.1   |  21<L>  |  0.75    Ca    9.3      30 Aug 2021 07:24  Ca    9.0      29 Aug 2021 07:17  Phos  3.3     08-30  Phos  3.2     08-29  Mg     2.2     08-30  Mg     2.1     08-29    TPro  6.6  /  Alb  3.4  /  TBili  0.8  /  DBili  x   /  AST  27  /  ALT  30  /  AlkPhos  62  08-30  TPro  6.3  /  Alb  3.2<L>  /  TBili  0.8  /  DBili  x   /  AST  22  /  ALT  25  /  AlkPhos  57  08-29               Vascular Cardiology  Progress note    SERVICE LINE [phone]     [email or web address]     CC:  L leg swelling    INTERVAL HISTORY:    Switched back to heparin gtt for possible procedures.     Tearful this morning. States he is depressed. Frustrated with no visitors/calls from family. Would like to get up and walk. States he would like to go to a less depressing hospital, like [place].     No SOB, chest pain, LE edema/pain.       Allergies  No Known Allergies      MEDICATIONS:  heparin   Injectable 6500 Unit(s) IV Push every 6 hours PRN  heparin   Injectable 3000 Unit(s) IV Push every 6 hours PRN  heparin  Infusion.  Unit(s)/Hr IV Continuous <Continuous>  ALPRAZolam 0.5 milliGRAM(s) Oral two times a day PRN  polyethylene glycol 3350 17 Gram(s) Oral two times a day  senna 2 Tablet(s) Oral at bedtime  folic acid 1 milliGRAM(s) Oral daily  multivitamin 1 Tablet(s) Oral daily      PAST MEDICAL & SURGICAL HISTORY:  History of alcohol use    No significant past surgical history        FAMILY HISTORY:  No pertinent family history in first degree relatives        SOCIAL HISTORY:  unchanged    REVIEW OF SYSTEMS:  CONSTITUTIONAL: No fever, weight loss, or fatigue  EYES: No eye pain, visual disturbances, or discharge  ENMT:  No difficulty hearing, tinnitus, vertigo; No sinus or throat pain  NECK: No pain or stiffness  RESPIRATORY: No cough, wheezing, chills or hemoptysis; No Shortness of Breath  CARDIOVASCULAR: No chest pain, palpitations, passing out, dizziness, or leg swelling  GASTROINTESTINAL: No abdominal or epigastric pain. No nausea, vomiting, or hematemesis; No diarrhea or constipation. No melena or hematochezia.  GENITOURINARY: No dysuria, frequency, hematuria, or incontinence  NEUROLOGICAL: No headaches, memory loss, loss of strength, numbness, or tremors  SKIN: No itching, burning, rashes, or lesions   LYMPH Nodes: No enlarged glands  ENDOCRINE: No heat or cold intolerance; No hair loss  MUSCULOSKELETAL: No joint pain or swelling; No muscle, back, or extremity pain  PSYCHIATRIC: Depressed  HEME/LYMPH: No easy bruising, or bleeding gums  ALLERY AND IMMUNOLOGIC: No hives or eczema	    [ x] All others negative	  [ ] Unable to obtain    PHYSICAL EXAM:  T(C): 36.7 (08-30-21 @ 04:52), Max: 37.1 (08-29-21 @ 13:54)  HR: 70 (08-30-21 @ 04:52) (70 - 84)  BP: 152/81 (08-30-21 @ 04:52) (139/79 - 152/81)  RR: 18 (08-30-21 @ 04:52) (18 - 18)  SpO2: 97% (08-30-21 @ 04:52) (95% - 98%)  Wt(kg): --  I&O's Summary    29 Aug 2021 07:01  -  30 Aug 2021 07:00  --------------------------------------------------------  IN: 21 mL / OUT: 1200 mL / NET: -1179 mL        Appearance: Tearful	  HEENT:   Normal oral mucosa, PERRL, EOMI	  Cardiovascular: Normal S1 S2, No JVD, No murmurs, No edema  Respiratory: Lungs clear to auscultation	  Psychiatry: A & O x 3, Mood & affect appropriate  Gastrointestinal:  Soft, Non-tender, + BS	  Skin: No rashes, No ecchymoses, No cyanosis	  Neurologic: Non-focal  Extremities: Normal range of motion, No clubbing, cyanosis. B/l LEs in MELLO stockings  no scrotal swelling        LABS:	 	    CBC Full  -  ( 30 Aug 2021 07:24 )  WBC Count : 6.50 K/uL  Hemoglobin : 15.4 g/dL  Hematocrit : 43.5 %  Platelet Count - Automated : 247 K/uL  Mean Cell Volume : 97.8 fl  Mean Cell Hemoglobin : 34.6 pg  Mean Cell Hemoglobin Concentration : 35.4 gm/dL    08-30    135  |  100  |  10  ----------------------------<  120<H>  4.4   |  20<L>  |  0.75  08-29    133<L>  |  101  |  9   ----------------------------<  147<H>  4.1   |  21<L>  |  0.75    Ca    9.3      30 Aug 2021 07:24  Ca    9.0      29 Aug 2021 07:17  Phos  3.3     08-30  Phos  3.2     08-29  Mg     2.2     08-30  Mg     2.1     08-29    TPro  6.6  /  Alb  3.4  /  TBili  0.8  /  DBili  x   /  AST  27  /  ALT  30  /  AlkPhos  62  08-30  TPro  6.3  /  Alb  3.2<L>  /  TBili  0.8  /  DBili  x   /  AST  22  /  ALT  25  /  AlkPhos  57  08-29

## 2021-08-30 NOTE — PROGRESS NOTE ADULT - ATTENDING COMMENTS
Liver lesions looked at with radiology (MRI), appears like hemangiomas which are decreased in size from prior CT from 2011.  No hepatic cause of thrombophilia can be identified.  Recommend hematologic w/u or outpatient f/u with hematologist for thrombophilic work-up and determination of duration of anticoagulation.  Also we have offered him a EGD + Colonoscopy as an inpatient to r/u GI malignancy, especially consider son with metastatic colon cancer at a young age but he does not want this as an inpatient and he will get this done in a timely manner as an outpatient with his private gastroenterologist.

## 2021-08-30 NOTE — PROGRESS NOTE ADULT - ATTENDING COMMENTS
d/w HS team  71M with PMH of alcohol abuse (recent hospitalization for alcohol withdrawal symptoms of seizures and delirium) presenting with left lower leg swelling and pain, found to have extensive L lower leg DVT  IVC thrombus, and b/l PE  LLE-iliocaval DVT thrombectomy 8/25    heparin switched to xarelto,   CT findings reviewed- hepatic nodules/massess- noted r/o primary/secondary malignancy   hepatology cs  appreciated  MRI liver/abd, noted- heamngiomas noted, no malignancies  switch ac back to xarelto  dc planning    [facility]  [phone]

## 2021-08-30 NOTE — DIETITIAN INITIAL EVALUATION ADULT. - ADD RECOMMEND
Continue folic acid, multivitamin as able in setting of alcohol use disorder. Encouraged PO intake as tolerated. Diet education provided. Patient made aware RD remains available. Monitor PO intake, weight, labs, skin, GI status, diet.

## 2021-08-30 NOTE — PROGRESS NOTE ADULT - ASSESSMENT
71M with PMH of alcohol use disorder (recent hospitalization at [place] 2-3 days ago for alcohol withdrawal symptoms of seizures and delirium) presenting with left lower leg swelling and pain. Workup here revealed DVT and PE, s/p Iliocaval venous thrombosis mechanical thrombectomy by IR 8/25, currently on a heparin gtt. RUQ US showing hepatic steatosis vs early cirrhosis. CT A/P showing bilobar hypodense hepatic lesions- L lobe lesion 3.5 x 2.8 cm, R lobe 1.1 x 1.0 cm, caudate lobe 1.4 x 1.2 cm.  Hepatology consulted due to liver lesions.      Impression:  #bilobar hypodense hepatic lesions- L lobe lesion 3.5 x 2.8 cm, R lobe 1.1 x 1.0 cm, caudate lobe 1.4 x 1.2 cm; found incidentally on CT A/P this admission; unclear etiology; differential includes cysts vs hemangiomas vs metastasis of unknown origin as these can all present as hypodense liver lesions. There is concern that this may be malignancy in the setting of recently diagnosed VTEs and family h/o of malignancy in his son.  #overdue to colon cancer screening with ?family hx of colon polyps in son- last colonoscopy was 10 years ago and reportedly normal  #ETOH use disorder-  intermittent ETOH use, up to 3 shots of vodka in one occasion; h/o withdrawal seizures, withdrawal or LOC      Recommendations:  - will need further workup with MRI abdomen w wo cont   - will consider possible IR consult for liver bx pending MRI abdomen results  - order AFP, CEA, CA 19-9  - check HBV SAg, cAb, SAb; HCV Ab  - consider repeat colonoscopy while inpatient pending MRI abdomen results  - rest of care per primary team    We will continue to follow.    Adia Soto MD  GI Fellow, PGY-4  Available via [employer] Teams    NON-URGENT CONSULTS:  Please email [email or web address]  [email or web address]  AT NIGHT AND ON WEEKENDS:  Contact on-call GI fellow via answering service ([phone]) from 5pm-8am and on weekends/holidays  MONDAY-FRIDAY 8AM-5PM:  Pager# [phone] ([facility]) or [phone] ([facility])  GI Phone# [phone] ([facility]) 71M with PMH of alcohol use disorder (recent hospitalization at [place] 2-3 days ago for alcohol withdrawal symptoms of seizures and delirium) presenting with left lower leg swelling and pain. Workup here revealed DVT and PE, s/p Iliocaval venous thrombosis mechanical thrombectomy by IR 8/25, currently on a heparin gtt. RUQ US showing hepatic steatosis vs early cirrhosis. CT A/P showing bilobar hypodense hepatic lesions- L lobe lesion 3.5 x 2.8 cm, R lobe 1.1 x 1.0 cm, caudate lobe 1.4 x 1.2 cm.  Hepatology consulted due to liver lesions.      Impression:  #bilobar hypodense hepatic lesions- L lobe lesion 3.5 x 2.8 cm, R lobe 1.1 x 1.0 cm, caudate lobe 1.4 x 1.2 cm; found incidentally on CT A/P this admission; there was concern that this may be malignancy in the setting of recently diagnosed VTEs and family h/o of malignancy in his son. MRI abd this admission showing these are more likely to represent hemangiomas. AFP, CEA, CA 19-9 all WNL. Hep A/B/C all neg this admission.  #overdue to colon cancer screening with ?family hx of colon polyps in son- last colonoscopy was 10 years ago and reportedly normal; AFP, CEA, CA 19-9 all WNL.   #ETOH use disorder-  intermittent ETOH use, up to 3 shots of vodka in one occasion; h/o withdrawal seizures, withdrawal or LOC      Recommendations:  - no need for further workup of liver hemangiomas  - offered repeat EGD/colon this admission to r/o occult malignancy in the setting of VTEs however pt declined and stated he would follow up with his OP gastroenterologist  - recommend hematology eval either inpatient or outpatient for evaluation of VTEs and thrombophilia workup  - rest of care per primary team    We will sign off at this time. Please reconsult/page if questions.      Adia Soto MD  GI Fellow, PGY-4  Available via [employer] Teams    NON-URGENT CONSULTS:  Please email [email or web address]  [email or web address]  AT NIGHT AND ON WEEKENDS:  Contact on-call GI fellow via answering service ([phone]) from 5pm-8am and on weekends/holidays  MONDAY-FRIDAY 8AM-5PM:  Pager# [phone] ([facility]) or [phone] ([facility])  GI Phone# [phone] ([facility])

## 2021-08-30 NOTE — DIETITIAN INITIAL EVALUATION ADULT. - REASON INDICATOR FOR ASSESSMENT
Pt seen for length of stay assessment. Source: EMR, pt. Pt is a 70 yo male with PMH of alcohol use disorder (recent hospitalization for alcohol withdrawal symptoms of seizures and delirium) who presented with L lower leg swelling and pain. Admitted 8/23.

## 2021-08-30 NOTE — DIETITIAN INITIAL EVALUATION ADULT. - ORAL INTAKE PTA/DIET HISTORY
Pt reports good appetite and PO intake PTA. Reports taking multiple vitamins PTA however unable to recall which at this time. Per chart review, pt not taking any medications/vitamin/mineral supplements PTA.

## 2021-08-30 NOTE — PROGRESS NOTE ADULT - PROBLEM SELECTOR PLAN 2
CT Chest: Bilateral pulmonary emboli.    Plan  -hold xarelto in setting of possible biopsy in the near future; transition to hep gtt

## 2021-08-31 LAB
ANA TITR SER: NEGATIVE — SIGNIFICANT CHANGE UP
MITOCHONDRIA AB SER-ACNC: SIGNIFICANT CHANGE UP
SMOOTH MUSCLE AB SER-ACNC: ABNORMAL
SOLUBLE LIVER IGG SER IA-ACNC: 0.7 — SIGNIFICANT CHANGE UP (ref 0–20)

## 2021-08-31 PROCEDURE — 99232 SBSQ HOSP IP/OBS MODERATE 35: CPT

## 2021-08-31 PROCEDURE — 93306 TTE W/DOPPLER COMPLETE: CPT | Mod: 26

## 2021-08-31 RX ADMIN — RIVAROXABAN 15 MILLIGRAM(S): KIT at 08:55

## 2021-08-31 RX ADMIN — Medication 1 TABLET(S): at 12:57

## 2021-08-31 RX ADMIN — POLYETHYLENE GLYCOL 3350 17 GRAM(S): 17 POWDER, FOR SOLUTION ORAL at 18:20

## 2021-08-31 RX ADMIN — SENNA PLUS 2 TABLET(S): 8.6 TABLET ORAL at 22:37

## 2021-08-31 RX ADMIN — Medication 1 MILLIGRAM(S): at 12:57

## 2021-08-31 RX ADMIN — RIVAROXABAN 15 MILLIGRAM(S): KIT at 18:20

## 2021-08-31 NOTE — PROGRESS NOTE ADULT - ATTENDING COMMENTS
d/w HS team  71M with PMH of alcohol abuse (recent hospitalization for alcohol withdrawal symptoms of seizures and delirium) presenting with left lower leg swelling and pain, found to have extensive L lower leg DVT  IVC thrombus, and b/l PE  LLE-iliocaval DVT thrombectomy 8/25    heparin switched to xarelto,   CT findings reviewed- hepatic nodules/massess- noted   hepatology cs  appreciated  MRI liver/abd, noted- hemangiomas noted, no malignancies  switch ac back to xarelto  dc planning    [facility]  [phone]

## 2021-08-31 NOTE — PROGRESS NOTE ADULT - PROBLEM SELECTOR PLAN 1
Acute lower leg pain and swelling with warmth, extensive thrombus on venous US.  Palpable DP pulses, warm, perfused, low concern for limb ischemia  Most likely provoked DVT i/s/o immobility from recent hospitalization  CT Venogram Abd + Pelvis: Thrombus within the infrarenal IVC extends into the left common iliac vein and left external iliac vein to the left common femoral vein.  CT Chest: Bilateral pulmonary emboli.  POD # 1 from LLE-iliocaval DVT thrombectomy.    Plan  -hold xarelto in setting of possible biopsy in the near future; transition to hep gtt  -Outpatient heme follow up  -IR planning LLE thrombectomy/ thrombolysis 8/25 Acute lower leg pain and swelling with warmth, extensive thrombus on venous US.  Palpable DP pulses, warm, perfused, low concern for limb ischemia  Most likely provoked DVT i/s/o immobility from recent hospitalization  CT Venogram Abd + Pelvis: Thrombus within the infrarenal IVC extends into the left common iliac vein and left external iliac vein to the left common femoral vein.  CT Chest: Bilateral pulmonary emboli.  s/p LLE-iliocaval DVT thrombectomy w/ post thrombectomy venogram demonstrates successful thrombectomy with no evidence of residual thrombus burden.    Plan  -on xoralto   -Outpatient heme follow up

## 2021-08-31 NOTE — PROGRESS NOTE ADULT - ASSESSMENT
71M with PMH of alcohol use disorder (recent hospitalization for alcohol withdrawal symptoms of seizures and delirium) presenting with left lower leg swelling and pain, found to have extensive L lower leg DVT most likely i/s/o immobilization from recent hospitalization. Currently no sx of DT. Pending workup of incidentally found liver lesions

## 2021-08-31 NOTE — PROGRESS NOTE ADULT - PROBLEM SELECTOR PLAN 6
DVT ppx: holding xarelto in setting of possible biopsy; transition to hep gtt  Diet: Regular   PT and OT recs: TRAN DVT ppx: xoralto   Diet: Regular   PT and OT recs: TRAN

## 2021-08-31 NOTE — PROGRESS NOTE ADULT - ASSESSMENT
Assessment:  #Extensive LLE DVT with phlegmasia  - Unprovoked  - s/p successful IR thrombectomy 8/25/21  #Bilateral pulmonary embolism   #Hepatic lesions  #EtOH use disorder    Plan:  - Continue therapeutic anticoagulation, currently on heparin gtt pending possible liver biopsy  - now on Xarelto 15 mg BID x21 days, followed by Xarelto 20 mg daily  - Please check echocardiogram, pending  - Encourage ambulation   - Work-up of hepatic lesions per primary team and hepatology   - No further inpatient vascular workup, discharge planning when appropriate. Our office will call him to set up an follow-up appt    Frank Juarez MD  Cardiology Fellow   [phone]    All cardiology service information may be found 24/7 on [email or web address], password: [email or web address]   Assessment:  #Extensive LLE DVT with phlegmasia  - Unprovoked  - s/p successful IR thrombectomy 8/25/21  #Bilateral pulmonary embolism   #Hepatic lesions  #EtOH use disorder    Plan:  - Continue therapeutic anticoagulation, currently on heparin gtt pending possible liver biopsy  - now on Xarelto 15 mg BID x21 days, followed by Xarelto 20 mg daily  - Please check echocardiogram, pending  - Encourage ambulation   - Work-up of hepatic lesions per primary team and hepatology   - No further inpatient vascular workup, discharge planning when appropriate. Our office will call him to set up an follow-up appt  - will need Vascular Cardiology follow up in 1-2 weeks  - can call [phone] to arrange appointment    Frank Juarez MD  Cardiology Fellow   [phone]    All cardiology service information may be found 24/7 on [email or web address], password: [email or web address]   Assessment:  #Extensive LLE DVT with phlegmasia  - Unprovoked  - s/p successful IR thrombectomy 8/25/21  #Bilateral pulmonary embolism   #Hepatic lesions  #EtOH use disorder    Plan:  - Continue therapeutic anticoagulation, currently on heparin gtt pending possible liver biopsy  - now on Xarelto 15 mg BID x21 days, followed by Xarelto 20 mg daily  - Please check echocardiogram, pending  - Encourage ambulation   - Work-up of hepatic lesions per primary team and hepatology   - would recommend Heme consult for hypercoagulable work up  -   - will need Vascular Cardiology follow up in 1-2 weeks  - can call [phone] to arrange appointment    Frank Juarez MD  Cardiology Fellow   [phone]    All cardiology service information may be found 24/7 on [email or web address], password: [email or web address]   Assessment:  #Extensive LLE DVT with phlegmasia  - Unprovoked  - s/p successful IR thrombectomy 8/25/21  #Bilateral pulmonary embolism   #Hepatic lesions  #EtOH use disorder    Plan:  - Continue therapeutic anticoagulation, currently on heparin gtt pending possible liver biopsy  - now on Xarelto 15 mg BID x21 days, followed by Xarelto 20 mg daily  - Please check echocardiogram, pending  - Encourage ambulation   - Work-up of hepatic lesions per primary team and hepatology   - would recommend Heme consult for hypercoagulable work up  - Recommend hypercoagulable work-up labs including:     Check Beta 2 Glycoprotein, Silica Clotting Time, Dilute Nicolas Viper Venom, Antiphospholipid  - will need Vascular Cardiology follow up in 1-2 weeks  - can call [phone] to arrange appointment    Frank Juarez MD  Cardiology Fellow   [phone]    All cardiology service information may be found 24/7 on [email or web address], password: [email or web address]

## 2021-08-31 NOTE — PROGRESS NOTE ADULT - SUBJECTIVE AND OBJECTIVE BOX
*****************************************  Camila London MD I PGY1  Internal Medicine  Pager NS: [phone]  *****************************************      Patient is a 71y old  Male who presents with a chief complaint of left lower leg swelling and pain (30 Aug 2021 15:33)      SUBJECTIVE :      MEDICATIONS  (STANDING):  folic acid 1 milliGRAM(s) Oral daily  multivitamin 1 Tablet(s) Oral daily  polyethylene glycol 3350 17 Gram(s) Oral two times a day  rivaroxaban 15 milliGRAM(s) Oral two times a day with meals  senna 2 Tablet(s) Oral at bedtime    MEDICATIONS  (PRN):  LORazepam     Tablet 1 milliGRAM(s) Oral daily PRN Agitation      CAPILLARY BLOOD GLUCOSE        I&O's Summary    29 Aug 2021 07:01  -  30 Aug 2021 07:00  --------------------------------------------------------  IN: 21 mL / OUT: 1200 mL / NET: -1179 mL    30 Aug 2021 07:01  -  31 Aug 2021 06:26  --------------------------------------------------------  IN: 0 mL / OUT: 800 mL / NET: -800 mL        PHYSICAL EXAM:  Vital Signs Last 24 Hrs  T(C): 36.8 (31 Aug 2021 05:46), Max: 36.8 (30 Aug 2021 14:15)  T(F): 98.2 (31 Aug 2021 05:46), Max: 98.3 (30 Aug 2021 14:15)  HR: 72 (31 Aug 2021 05:46) (72 - 80)  BP: 131/73 (31 Aug 2021 05:46) (131/73 - 148/87)  BP(mean): --  RR: 18 (31 Aug 2021 05:46) (18 - 18)  SpO2: 94% (31 Aug 2021 05:46) (94% - 97%)    GENERAL: NAD, well-developed  HEENT: sclera clear  CHEST/LUNG: Clear to auscultation bilaterally; No wheezes or rales  HEART: Regular rate and rhythm; No murmurs, rubs, or gallops  ABDOMEN: Soft, Nontender, Nondistended; Bowel sounds present  EXTREMITIES:  2+ Peripheral Pulses, +BL LE are wrapped  PSYCH: AAOx3  NEUROLOGY: non-focal  SKIN: No rashes or lesions    LABS:                        15.4   6.50  )-----------( 247      ( 30 Aug 2021 07:24 )             43.5     08-30    135  |  100  |  10  ----------------------------<  120<H>  4.4   |  20<L>  |  0.75    Ca    9.3      30 Aug 2021 07:24  Phos  3.3     08-30  Mg     2.2     08-30    TPro  6.6  /  Alb  3.4  /  TBili  0.8  /  DBili  x   /  AST  27  /  ALT  30  /  AlkPhos  62  08-30    PT/INR - ( 29 Aug 2021 14:04 )   PT: 19.9 sec;   INR: 1.70 ratio         PTT - ( 30 Aug 2021 07:24 )  PTT:41.4 sec            RADIOLOGY & ADDITIONAL TESTS:  Results Reviewed: Y  Imaging Personally Reviewed:  Electrocardiogram Personally Reviewed:    COORDINATION OF CARE:  Care Discussed with Consultants/Other Providers [Y/N]:  Prior or Outpatient Records Reviewed [Y/N]:   *****************************************  Camila London MD I PGY1  Internal Medicine  Pager NS: [phone]  *****************************************      Patient is a 71y old  Male who presents with a chief complaint of left lower leg swelling and pain (30 Aug 2021 15:33)      SUBJECTIVE : NAEO. Pt seen and examined at bedside. Notes that he is tired of being at the hospital and wants to go to [facility] near his home to be near his family. Notes that he is able to walk around with assistance. Denies CP, SOB, fever/chills, dysuria, hematuria, diarrhea/constipation.       MEDICATIONS  (STANDING):  folic acid 1 milliGRAM(s) Oral daily  multivitamin 1 Tablet(s) Oral daily  polyethylene glycol 3350 17 Gram(s) Oral two times a day  rivaroxaban 15 milliGRAM(s) Oral two times a day with meals  senna 2 Tablet(s) Oral at bedtime    MEDICATIONS  (PRN):  LORazepam     Tablet 1 milliGRAM(s) Oral daily PRN Agitation      CAPILLARY BLOOD GLUCOSE        I&O's Summary    29 Aug 2021 07:01  -  30 Aug 2021 07:00  --------------------------------------------------------  IN: 21 mL / OUT: 1200 mL / NET: -1179 mL    30 Aug 2021 07:01  -  31 Aug 2021 06:26  --------------------------------------------------------  IN: 0 mL / OUT: 800 mL / NET: -800 mL        PHYSICAL EXAM:  Vital Signs Last 24 Hrs  T(C): 36.8 (31 Aug 2021 05:46), Max: 36.8 (30 Aug 2021 14:15)  T(F): 98.2 (31 Aug 2021 05:46), Max: 98.3 (30 Aug 2021 14:15)  HR: 72 (31 Aug 2021 05:46) (72 - 80)  BP: 131/73 (31 Aug 2021 05:46) (131/73 - 148/87)  BP(mean): --  RR: 18 (31 Aug 2021 05:46) (18 - 18)  SpO2: 94% (31 Aug 2021 05:46) (94% - 97%)    GENERAL: NAD, well-developed  HEENT: sclera clear  CHEST/LUNG: Clear to auscultation bilaterally; No wheezes or rales  HEART: Regular rate and rhythm; No murmurs, rubs, or gallops  ABDOMEN: Soft, Nontender, Nondistended; Bowel sounds present  EXTREMITIES:  2+ Peripheral Pulses, +BL LE are wrapped  PSYCH: AAOx3  NEUROLOGY: non-focal  SKIN: No rashes or lesions    LABS:                        15.4   6.50  )-----------( 247      ( 30 Aug 2021 07:24 )             43.5     08-30    135  |  100  |  10  ----------------------------<  120<H>  4.4   |  20<L>  |  0.75    Ca    9.3      30 Aug 2021 07:24  Phos  3.3     08-30  Mg     2.2     08-30    TPro  6.6  /  Alb  3.4  /  TBili  0.8  /  DBili  x   /  AST  27  /  ALT  30  /  AlkPhos  62  08-30    PT/INR - ( 29 Aug 2021 14:04 )   PT: 19.9 sec;   INR: 1.70 ratio         PTT - ( 30 Aug 2021 07:24 )  PTT:41.4 sec            RADIOLOGY & ADDITIONAL TESTS:  Results Reviewed: Y  Imaging Personally Reviewed:  Electrocardiogram Personally Reviewed:    COORDINATION OF CARE:  Care Discussed with Consultants/Other Providers [Y/N]:  Prior or Outpatient Records Reviewed [Y/N]:

## 2021-08-31 NOTE — PROGRESS NOTE ADULT - PROBLEM SELECTOR PLAN 2
CT Chest: Bilateral pulmonary emboli.    Plan  -hold xarelto in setting of possible biopsy in the near future; transition to hep gtt CT Chest: Bilateral pulmonary emboli.    Plan  -on xoralto

## 2021-08-31 NOTE — PROGRESS NOTE ADULT - SUBJECTIVE AND OBJECTIVE BOX
All Cardiology service information can be found 24/7 on [email or web address], password: [email or web address]    No events overnight    Meds:  folic acid 1 milliGRAM(s) Oral daily  multivitamin 1 Tablet(s) Oral daily  polyethylene glycol 3350 17 Gram(s) Oral two times a day  rivaroxaban 15 milliGRAM(s) Oral two times a day with meals  senna 2 Tablet(s) Oral at bedtime      T(C): 36.8 (08-31-21 @ 05:46), Max: 36.8 (08-30-21 @ 14:15)  HR: 72 (08-31-21 @ 05:46) (72 - 80)  BP: 131/73 (08-31-21 @ 05:46) (131/73 - 148/87)  RR: 18 (08-31-21 @ 05:46) (18 - 18)  SpO2: 94% (08-31-21 @ 05:46) (94% - 97%)    08-30-21 @ 07:01  -  08-31-21 @ 07:00  --------------------------------------------------------  IN: 0 mL / OUT: 800 mL / NET: -800 mL    Exam:  Appearance: No Acute Distress  HEENT: No JVD  Cardiovascular: Normal S1 S2, No murmurs/rubs/gallops  Respiratory: Clear to auscultation bilaterally  Gastrointestinal: Soft, Non-tender	  Skin: No cyanosis	  Neurologic: Non-focal    08-30    135  |  100  |  10  ----------------------------<  120<H>  4.4   |  20<L>  |  0.75    Ca    9.3      30 Aug 2021 07:24  Phos  3.3     08-30  Mg     2.2     08-30    TPro  6.6  /  Alb  3.4  /  TBili  0.8  /  DBili  x   /  AST  27  /  ALT  30  /  AlkPhos  62  08-30                          15.4   6.50  )-----------( 247      ( 30 Aug 2021 07:24 )             43.5     PT/INR - ( 29 Aug 2021 14:04 )   PT: 19.9 sec;   INR: 1.70 ratio         PTT - ( 30 Aug 2021 07:24 )  PTT:41.4 sec

## 2021-09-01 ENCOUNTER — TRANSCRIPTION ENCOUNTER (OUTPATIENT)
Age: 71
End: 2021-09-01

## 2021-09-01 VITALS
OXYGEN SATURATION: 96 % | SYSTOLIC BLOOD PRESSURE: 124 MMHG | DIASTOLIC BLOOD PRESSURE: 75 MMHG | HEART RATE: 77 BPM | TEMPERATURE: 98 F | RESPIRATION RATE: 18 BRPM

## 2021-09-01 PROBLEM — Z87.898 PERSONAL HISTORY OF OTHER SPECIFIED CONDITIONS: Chronic | Status: ACTIVE | Noted: 2021-08-24

## 2021-09-01 LAB
ALBUMIN SERPL ELPH-MCNC: 3.6 G/DL — SIGNIFICANT CHANGE UP (ref 3.3–5)
ALP SERPL-CCNC: 69 U/L — SIGNIFICANT CHANGE UP (ref 40–120)
ALT FLD-CCNC: 34 U/L — SIGNIFICANT CHANGE UP (ref 10–45)
ANION GAP SERPL CALC-SCNC: 14 MMOL/L — SIGNIFICANT CHANGE UP (ref 5–17)
AST SERPL-CCNC: 22 U/L — SIGNIFICANT CHANGE UP (ref 10–40)
BILIRUB SERPL-MCNC: 0.5 MG/DL — SIGNIFICANT CHANGE UP (ref 0.2–1.2)
BUN SERPL-MCNC: 14 MG/DL — SIGNIFICANT CHANGE UP (ref 7–23)
CALCIUM SERPL-MCNC: 9.3 MG/DL — SIGNIFICANT CHANGE UP (ref 8.4–10.5)
CHLORIDE SERPL-SCNC: 100 MMOL/L — SIGNIFICANT CHANGE UP (ref 96–108)
CO2 SERPL-SCNC: 21 MMOL/L — LOW (ref 22–31)
CREAT SERPL-MCNC: 0.85 MG/DL — SIGNIFICANT CHANGE UP (ref 0.5–1.3)
DRVVT SCREEN TO CONFIRM RATIO: SIGNIFICANT CHANGE UP
GLUCOSE SERPL-MCNC: 112 MG/DL — HIGH (ref 70–99)
HCT VFR BLD CALC: 43.6 % — SIGNIFICANT CHANGE UP (ref 39–50)
HGB BLD-MCNC: 15.2 G/DL — SIGNIFICANT CHANGE UP (ref 13–17)
LA NT DPL PPP QL: 50.4 SEC — SIGNIFICANT CHANGE UP
MAGNESIUM SERPL-MCNC: 2.2 MG/DL — SIGNIFICANT CHANGE UP (ref 1.6–2.6)
MCHC RBC-ENTMCNC: 34.2 PG — HIGH (ref 27–34)
MCHC RBC-ENTMCNC: 34.9 GM/DL — SIGNIFICANT CHANGE UP (ref 32–36)
MCV RBC AUTO: 98.2 FL — SIGNIFICANT CHANGE UP (ref 80–100)
NORMALIZED SCT PPP-RTO: 0.85 RATIO — SIGNIFICANT CHANGE UP (ref 0–1.16)
NORMALIZED SCT PPP-RTO: SIGNIFICANT CHANGE UP
NRBC # BLD: 0 /100 WBCS — SIGNIFICANT CHANGE UP (ref 0–0)
PHOSPHATE SERPL-MCNC: 3.1 MG/DL — SIGNIFICANT CHANGE UP (ref 2.5–4.5)
PLATELET # BLD AUTO: 246 K/UL — SIGNIFICANT CHANGE UP (ref 150–400)
POTASSIUM SERPL-MCNC: 4.3 MMOL/L — SIGNIFICANT CHANGE UP (ref 3.5–5.3)
POTASSIUM SERPL-SCNC: 4.3 MMOL/L — SIGNIFICANT CHANGE UP (ref 3.5–5.3)
PROT SERPL-MCNC: 6.8 G/DL — SIGNIFICANT CHANGE UP (ref 6–8.3)
RBC # BLD: 4.44 M/UL — SIGNIFICANT CHANGE UP (ref 4.2–5.8)
RBC # FLD: 12 % — SIGNIFICANT CHANGE UP (ref 10.3–14.5)
SODIUM SERPL-SCNC: 135 MMOL/L — SIGNIFICANT CHANGE UP (ref 135–145)
WBC # BLD: 6.64 K/UL — SIGNIFICANT CHANGE UP (ref 3.8–10.5)
WBC # FLD AUTO: 6.64 K/UL — SIGNIFICANT CHANGE UP (ref 3.8–10.5)

## 2021-09-01 PROCEDURE — C1887: CPT

## 2021-09-01 PROCEDURE — 99285 EMERGENCY DEPT VISIT HI MDM: CPT

## 2021-09-01 PROCEDURE — 83735 ASSAY OF MAGNESIUM: CPT

## 2021-09-01 PROCEDURE — 80053 COMPREHEN METABOLIC PANEL: CPT

## 2021-09-01 PROCEDURE — 86900 BLOOD TYPING SEROLOGIC ABO: CPT

## 2021-09-01 PROCEDURE — 97130 THER IVNTJ EA ADDL 15 MIN: CPT

## 2021-09-01 PROCEDURE — 83520 IMMUNOASSAY QUANT NOS NONAB: CPT

## 2021-09-01 PROCEDURE — 80074 ACUTE HEPATITIS PANEL: CPT

## 2021-09-01 PROCEDURE — 83010 ASSAY OF HAPTOGLOBIN QUANT: CPT

## 2021-09-01 PROCEDURE — 82105 ALPHA-FETOPROTEIN SERUM: CPT

## 2021-09-01 PROCEDURE — 84132 ASSAY OF SERUM POTASSIUM: CPT

## 2021-09-01 PROCEDURE — 36012 PLACE CATHETER IN VEIN: CPT

## 2021-09-01 PROCEDURE — 82607 VITAMIN B-12: CPT

## 2021-09-01 PROCEDURE — 85610 PROTHROMBIN TIME: CPT

## 2021-09-01 PROCEDURE — 70450 CT HEAD/BRAIN W/O DYE: CPT | Mod: MA

## 2021-09-01 PROCEDURE — 76705 ECHO EXAM OF ABDOMEN: CPT

## 2021-09-01 PROCEDURE — C9399: CPT

## 2021-09-01 PROCEDURE — 82140 ASSAY OF AMMONIA: CPT

## 2021-09-01 PROCEDURE — 86706 HEP B SURFACE ANTIBODY: CPT

## 2021-09-01 PROCEDURE — 76937 US GUIDE VASCULAR ACCESS: CPT

## 2021-09-01 PROCEDURE — 82565 ASSAY OF CREATININE: CPT

## 2021-09-01 PROCEDURE — 82330 ASSAY OF CALCIUM: CPT

## 2021-09-01 PROCEDURE — 83605 ASSAY OF LACTIC ACID: CPT

## 2021-09-01 PROCEDURE — 71045 X-RAY EXAM CHEST 1 VIEW: CPT

## 2021-09-01 PROCEDURE — 83930 ASSAY OF BLOOD OSMOLALITY: CPT

## 2021-09-01 PROCEDURE — 82947 ASSAY GLUCOSE BLOOD QUANT: CPT

## 2021-09-01 PROCEDURE — 85045 AUTOMATED RETICULOCYTE COUNT: CPT

## 2021-09-01 PROCEDURE — U0003: CPT

## 2021-09-01 PROCEDURE — 82435 ASSAY OF BLOOD CHLORIDE: CPT

## 2021-09-01 PROCEDURE — 86301 IMMUNOASSAY TUMOR CA 19-9: CPT

## 2021-09-01 PROCEDURE — 74183 MRI ABD W/O CNTR FLWD CNTR: CPT

## 2021-09-01 PROCEDURE — 85014 HEMATOCRIT: CPT

## 2021-09-01 PROCEDURE — C1769: CPT

## 2021-09-01 PROCEDURE — 86376 MICROSOMAL ANTIBODY EACH: CPT

## 2021-09-01 PROCEDURE — C1757: CPT

## 2021-09-01 PROCEDURE — 97110 THERAPEUTIC EXERCISES: CPT

## 2021-09-01 PROCEDURE — 84300 ASSAY OF URINE SODIUM: CPT

## 2021-09-01 PROCEDURE — 93306 TTE W/DOPPLER COMPLETE: CPT

## 2021-09-01 PROCEDURE — 87340 HEPATITIS B SURFACE AG IA: CPT

## 2021-09-01 PROCEDURE — 71275 CT ANGIOGRAPHY CHEST: CPT

## 2021-09-01 PROCEDURE — C1894: CPT

## 2021-09-01 PROCEDURE — 93971 EXTREMITY STUDY: CPT

## 2021-09-01 PROCEDURE — 37187 VENOUS MECH THROMBECTOMY: CPT

## 2021-09-01 PROCEDURE — 80076 HEPATIC FUNCTION PANEL: CPT

## 2021-09-01 PROCEDURE — 86769 SARS-COV-2 COVID-19 ANTIBODY: CPT

## 2021-09-01 PROCEDURE — 85730 THROMBOPLASTIN TIME PARTIAL: CPT

## 2021-09-01 PROCEDURE — U0005: CPT

## 2021-09-01 PROCEDURE — 85018 HEMOGLOBIN: CPT

## 2021-09-01 PROCEDURE — 86255 FLUORESCENT ANTIBODY SCREEN: CPT

## 2021-09-01 PROCEDURE — 83935 ASSAY OF URINE OSMOLALITY: CPT

## 2021-09-01 PROCEDURE — 97162 PT EVAL MOD COMPLEX 30 MIN: CPT

## 2021-09-01 PROCEDURE — 74177 CT ABD & PELVIS W/CONTRAST: CPT

## 2021-09-01 PROCEDURE — 99232 SBSQ HOSP IP/OBS MODERATE 35: CPT

## 2021-09-01 PROCEDURE — 82390 ASSAY OF CERULOPLASMIN: CPT

## 2021-09-01 PROCEDURE — 86850 RBC ANTIBODY SCREEN: CPT

## 2021-09-01 PROCEDURE — 86704 HEP B CORE ANTIBODY TOTAL: CPT

## 2021-09-01 PROCEDURE — 82803 BLOOD GASES ANY COMBINATION: CPT

## 2021-09-01 PROCEDURE — 97129 THER IVNTJ 1ST 15 MIN: CPT

## 2021-09-01 PROCEDURE — 97116 GAIT TRAINING THERAPY: CPT

## 2021-09-01 PROCEDURE — 82746 ASSAY OF FOLIC ACID SERUM: CPT

## 2021-09-01 PROCEDURE — 80307 DRUG TEST PRSMV CHEM ANLYZR: CPT

## 2021-09-01 PROCEDURE — 83615 LACTATE (LD) (LDH) ENZYME: CPT

## 2021-09-01 PROCEDURE — 84100 ASSAY OF PHOSPHORUS: CPT

## 2021-09-01 PROCEDURE — 82247 BILIRUBIN TOTAL: CPT

## 2021-09-01 PROCEDURE — 83036 HEMOGLOBIN GLYCOSYLATED A1C: CPT

## 2021-09-01 PROCEDURE — 84295 ASSAY OF SERUM SODIUM: CPT

## 2021-09-01 PROCEDURE — 80048 BASIC METABOLIC PNL TOTAL CA: CPT

## 2021-09-01 PROCEDURE — 85025 COMPLETE CBC W/AUTO DIFF WBC: CPT

## 2021-09-01 PROCEDURE — 85027 COMPLETE CBC AUTOMATED: CPT

## 2021-09-01 PROCEDURE — 82103 ALPHA-1-ANTITRYPSIN TOTAL: CPT

## 2021-09-01 PROCEDURE — 86901 BLOOD TYPING SEROLOGIC RH(D): CPT

## 2021-09-01 PROCEDURE — 86038 ANTINUCLEAR ANTIBODIES: CPT

## 2021-09-01 PROCEDURE — 82010 KETONE BODYS QUAN: CPT

## 2021-09-01 PROCEDURE — 97166 OT EVAL MOD COMPLEX 45 MIN: CPT

## 2021-09-01 PROCEDURE — 86381 MITOCHONDRIAL ANTIBODY EACH: CPT

## 2021-09-01 PROCEDURE — 82962 GLUCOSE BLOOD TEST: CPT

## 2021-09-01 PROCEDURE — 82378 CARCINOEMBRYONIC ANTIGEN: CPT

## 2021-09-01 PROCEDURE — 80061 LIPID PANEL: CPT

## 2021-09-01 PROCEDURE — 82248 BILIRUBIN DIRECT: CPT

## 2021-09-01 RX ORDER — RIVAROXABAN 15 MG-20MG
1 KIT ORAL
Qty: 32 | Refills: 0
Start: 2021-09-01 | End: 2021-09-16

## 2021-09-01 RX ORDER — SENNA PLUS 8.6 MG/1
2 TABLET ORAL
Qty: 0 | Refills: 0 | DISCHARGE
Start: 2021-09-01

## 2021-09-01 RX ORDER — RIVAROXABAN 15 MG-20MG
1 KIT ORAL
Qty: 0 | Refills: 0 | DISCHARGE
Start: 2021-09-01

## 2021-09-01 RX ORDER — POLYETHYLENE GLYCOL 3350 17 G/17G
17 POWDER, FOR SOLUTION ORAL
Qty: 0 | Refills: 0 | DISCHARGE
Start: 2021-09-01

## 2021-09-01 RX ORDER — FOLIC ACID 0.8 MG
1 TABLET ORAL
Qty: 0 | Refills: 0 | DISCHARGE
Start: 2021-09-01

## 2021-09-01 RX ADMIN — Medication 1 TABLET(S): at 12:36

## 2021-09-01 RX ADMIN — RIVAROXABAN 15 MILLIGRAM(S): KIT at 08:33

## 2021-09-01 RX ADMIN — Medication 1 MILLIGRAM(S): at 12:36

## 2021-09-01 RX ADMIN — RIVAROXABAN 15 MILLIGRAM(S): KIT at 17:35

## 2021-09-01 NOTE — PROGRESS NOTE ADULT - PROBLEM SELECTOR PLAN 3
Recent hospitalization for alcohol withdrawal symptoms of seizures and delirium, currently no symptoms or signs of DT  US: early cirrhosis   B12, folate wnl  CT Abd and Pelvis: There are bilobar hypodense hepatic lesions.   The left lobe lesion the subtle area of peripheral enhancement and measures 3.5 x 2.8 cm.  The right lobe lesion measures 1.1 x 1.0 cm.  There is an additional lesion in the caudate lobe of the liver. The lesion measures 1.4 x 1.2 cm.  CIWA scores; 1-2; CIWA discontinued     Plan:   - c/w thiamine  - Folate and multivitamin daily  - home xanax for anxiety  - Hepatology following Recent hospitalization for alcohol withdrawal symptoms of seizures and delirium, currently no symptoms or signs of DT  US: early cirrhosis   B12, folate wnl  CT Abd and Pelvis: There are bilobar hypodense hepatic lesions.   The left lobe lesion the subtle area of peripheral enhancement and measures 3.5 x 2.8 cm.  The right lobe lesion measures 1.1 x 1.0 cm.  There is an additional lesion in the caudate lobe of the liver. The lesion measures 1.4 x 1.2 cm.  CIWA scores; 1-2; CIWA discontinued       Plan:   - c/w thiamine  - Folate and multivitamin daily  - home xanax for anxiety

## 2021-09-01 NOTE — DISCHARGE NOTE NURSING/CASE MANAGEMENT/SOCIAL WORK - PATIENT PORTAL LINK FT
You can access the FollowMyHealth Patient Portal offered by [facility] by registering at the following website: [email or web address]. By joining [employer]’s FollowMyHealth portal, you will also be able to view your health information using other applications (apps) compatible with our system.

## 2021-09-01 NOTE — PROGRESS NOTE ADULT - PROVIDER SPECIALTY LIST ADULT
Internal Medicine
Hepatology
Hepatology
Internal Medicine
Vascular Cardiology
Internal Medicine
Intervent Radiology
Intervent Radiology
Vascular Cardiology
Internal Medicine

## 2021-09-01 NOTE — PROGRESS NOTE ADULT - ASSESSMENT
Assessment:  #Extensive LLE DVT with phlegmasia  - Unprovoked  - s/p successful IR thrombectomy 8/25/21  #Bilateral pulmonary embolism   #Hepatic lesions  #EtOH use disorder    Plan:  - Continue therapeutic anticoagulation, currently on heparin gtt pending possible liver biopsy  - now on Xarelto 15 mg BID x21 days, followed by Xarelto 20 mg daily  - Please check echocardiogram, pending  - Encourage ambulation    - would recommend Heme consult for hypercoagulable work up, patient with unexplained DVT, concern for poor follow up  - please order hypercoagulable work-up labs including while inpatient:     Check Beta 2 Glycoprotein, Silica Clotting Time, Dilute Nicolas Viper Venom, Antiphospholipid  - will need Vascular Cardiology follow up in 1-2 weeks  - can call [phone] to arrange appointment    Frank Juarez MD  Cardiology Fellow   [phone]    All cardiology service information may be found 24/7 on [email or web address], password: [email or web address]

## 2021-09-01 NOTE — PROGRESS NOTE ADULT - REASON FOR ADMISSION
71M p/w Left lower extremity swelling

## 2021-09-01 NOTE — PROGRESS NOTE ADULT - PROBLEM SELECTOR PROBLEM 1
DVT of lower limb, acute

## 2021-09-01 NOTE — PROGRESS NOTE ADULT - SUBJECTIVE AND OBJECTIVE BOX
*****************************************  Camila London MD I PGY1  Internal Medicine  Pager NS: [phone]  *****************************************      Patient is a 71y old  Male who presents with a chief complaint of 71M p/w Left lower extremity swelling (31 Aug 2021 10:01)      SUBJECTIVE :      MEDICATIONS  (STANDING):  folic acid 1 milliGRAM(s) Oral daily  multivitamin 1 Tablet(s) Oral daily  polyethylene glycol 3350 17 Gram(s) Oral two times a day  rivaroxaban 15 milliGRAM(s) Oral two times a day with meals  senna 2 Tablet(s) Oral at bedtime    MEDICATIONS  (PRN):  LORazepam     Tablet 1 milliGRAM(s) Oral daily PRN Agitation      CAPILLARY BLOOD GLUCOSE        I&O's Summary    30 Aug 2021 07:01  -  31 Aug 2021 07:00  --------------------------------------------------------  IN: 0 mL / OUT: 800 mL / NET: -800 mL    31 Aug 2021 07:01  -  01 Sep 2021 06:44  --------------------------------------------------------  IN: 240 mL / OUT: 1 mL / NET: 239 mL        PHYSICAL EXAM:  Vital Signs Last 24 Hrs  T(C): 36.3 (31 Aug 2021 20:34), Max: 36.7 (31 Aug 2021 13:40)  T(F): 97.4 (31 Aug 2021 20:34), Max: 98.1 (31 Aug 2021 13:40)  HR: 76 (31 Aug 2021 20:34) (76 - 91)  BP: 116/81 (31 Aug 2021 20:34) (116/81 - 150/95)  BP(mean): --  RR: 16 (31 Aug 2021 20:34) (16 - 18)  SpO2: 98% (31 Aug 2021 20:34) (98% - 98%)    GENERAL: NAD, well-developed  HEENT: sclera clear  CHEST/LUNG: Clear to auscultation bilaterally; No wheezes or rales  HEART: Regular rate and rhythm; No murmurs, rubs, or gallops  ABDOMEN: Soft, Nontender, Nondistended; Bowel sounds present  EXTREMITIES:  2+ Peripheral Pulses, +BL LE are wrapped  PSYCH: AAOx3  NEUROLOGY: non-focal  SKIN: No rashes or lesions    LABS:                        15.4   6.50  )-----------( 247      ( 30 Aug 2021 07:24 )             43.5     08-30    135  |  100  |  10  ----------------------------<  120<H>  4.4   |  20<L>  |  0.75    Ca    9.3      30 Aug 2021 07:24  Phos  3.3     08-30  Mg     2.2     08-30    TPro  6.6  /  Alb  3.4  /  TBili  0.8  /  DBili  x   /  AST  27  /  ALT  30  /  AlkPhos  62  08-30    PTT - ( 30 Aug 2021 07:24 )  PTT:41.4 sec            RADIOLOGY & ADDITIONAL TESTS:  Results Reviewed:   Imaging Personally Reviewed:  Electrocardiogram Personally Reviewed:    COORDINATION OF CARE:  Care Discussed with Consultants/Other Providers [Y/N]:  Prior or Outpatient Records Reviewed [Y/N]:   *****************************************  Camila London MD I PGY1  Internal Medicine  Pager [phone]: [phone]  *****************************************      Patient is a 71y old  Male who presents with a chief complaint of 71M p/w Left lower extremity swelling (31 Aug 2021 10:01)      SUBJECTIVE : NAEO. Pt seen and examined at bedside. Wants to get out of the hospital desperately; does not want to see Hematology inpatient or do further workup; amenable to having further workup as outpatient. Denies CP, SOB, fever/chills, dysuria, hematuria, diarrhea/constipation.       MEDICATIONS  (STANDING):  folic acid 1 milliGRAM(s) Oral daily  multivitamin 1 Tablet(s) Oral daily  polyethylene glycol 3350 17 Gram(s) Oral two times a day  rivaroxaban 15 milliGRAM(s) Oral two times a day with meals  senna 2 Tablet(s) Oral at bedtime    MEDICATIONS  (PRN):  LORazepam     Tablet 1 milliGRAM(s) Oral daily PRN Agitation      CAPILLARY BLOOD GLUCOSE        I&O's Summary    30 Aug 2021 07:01  -  31 Aug 2021 07:00  --------------------------------------------------------  IN: 0 mL / OUT: 800 mL / NET: -800 mL    31 Aug 2021 07:01  -  01 Sep 2021 06:44  --------------------------------------------------------  IN: 240 mL / OUT: 1 mL / NET: 239 mL        PHYSICAL EXAM:  Vital Signs Last 24 Hrs  T(C): 36.3 (31 Aug 2021 20:34), Max: 36.7 (31 Aug 2021 13:40)  T(F): 97.4 (31 Aug 2021 20:34), Max: 98.1 (31 Aug 2021 13:40)  HR: 76 (31 Aug 2021 20:34) (76 - 91)  BP: 116/81 (31 Aug 2021 20:34) (116/81 - 150/95)  BP(mean): --  RR: 16 (31 Aug 2021 20:34) (16 - 18)  SpO2: 98% (31 Aug 2021 20:34) (98% - 98%)    GENERAL: NAD, well-developed  HEENT: sclera clear  CHEST/LUNG: Clear to auscultation bilaterally; No wheezes or rales  HEART: Regular rate and rhythm; No murmurs, rubs, or gallops  ABDOMEN: Soft, Nontender, Nondistended; Bowel sounds present  EXTREMITIES:  2+ Peripheral Pulses, +BL LE are wrapped  PSYCH: AAOx3  NEUROLOGY: non-focal  SKIN: No rashes or lesions    LABS:                        15.4   6.50  )-----------( 247      ( 30 Aug 2021 07:24 )             43.5     08-30    135  |  100  |  10  ----------------------------<  120<H>  4.4   |  20<L>  |  0.75    Ca    9.3      30 Aug 2021 07:24  Phos  3.3     08-30  Mg     2.2     08-30    TPro  6.6  /  Alb  3.4  /  TBili  0.8  /  DBili  x   /  AST  27  /  ALT  30  /  AlkPhos  62  08-30    PTT - ( 30 Aug 2021 07:24 )  PTT:41.4 sec            RADIOLOGY & ADDITIONAL TESTS:  Results Reviewed: Y  Imaging Personally Reviewed:  Electrocardiogram Personally Reviewed:    COORDINATION OF CARE:  Care Discussed with Consultants/Other Providers [Y/N]:  Prior or Outpatient Records Reviewed [Y/N]:

## 2021-09-01 NOTE — DISCHARGE NOTE NURSING/CASE MANAGEMENT/SOCIAL WORK - NSDCFUADDAPPT_GEN_ALL_CORE_FT
Communicable/Infectious Please follow up with your PCP for regular health maintenance.     Please follow with the GI doctors for the management of your liver lesions.

## 2021-09-01 NOTE — PROGRESS NOTE ADULT - SUBJECTIVE AND OBJECTIVE BOX
All Cardiology service information can be found 24/7 on [email or web address], password: [email or web address]    No events overnight    Meds:  folic acid 1 milliGRAM(s) Oral daily  multivitamin 1 Tablet(s) Oral daily  polyethylene glycol 3350 17 Gram(s) Oral two times a day  rivaroxaban 15 milliGRAM(s) Oral two times a day with meals  senna 2 Tablet(s) Oral at bedtime      T(C): 36.5 (09-01-21 @ 10:13), Max: 36.7 (08-31-21 @ 13:40)  HR: 77 (09-01-21 @ 10:13) (65 - 91)  BP: 124/75 (09-01-21 @ 10:13) (116/81 - 150/95)  RR: 18 (09-01-21 @ 10:13) (16 - 18)  SpO2: 96% (09-01-21 @ 10:13) (96% - 98%)    08-31-21 @ 07:01  -  09-01-21 @ 07:00  --------------------------------------------------------  IN: 240 mL / OUT: 301 mL / NET: -61 mL    09-01-21 @ 07:01  -  09-01-21 @ 11:09  --------------------------------------------------------  IN: 240 mL / OUT: 0 mL / NET: 240 mL        Exam:  Appearance: No Acute Distress  HEENT: No JVD  Cardiovascular: Normal S1 S2, No murmurs/rubs/gallops  Respiratory: Clear to auscultation bilaterally  Gastrointestinal: Soft, Non-tender	  Skin: No cyanosis	  Neurologic: Non-focal

## 2021-09-01 NOTE — PROGRESS NOTE ADULT - PROBLEM SELECTOR PLAN 1
Acute lower leg pain and swelling with warmth, extensive thrombus on venous US.  Palpable DP pulses, warm, perfused, low concern for limb ischemia  Most likely provoked DVT i/s/o immobility from recent hospitalization  CT Venogram Abd + Pelvis: Thrombus within the infrarenal IVC extends into the left common iliac vein and left external iliac vein to the left common femoral vein.  CT Chest: Bilateral pulmonary emboli.  s/p LLE-iliocaval DVT thrombectomy w/ post thrombectomy venogram demonstrates successful thrombectomy with no evidence of residual thrombus burden.    Plan  -on xoralto   -Outpatient heme follow up Acute lower leg pain and swelling with warmth, extensive thrombus on venous US.  Palpable DP pulses, warm, perfused, low concern for limb ischemia  Most likely provoked DVT i/s/o immobility from recent hospitalization  CT Venogram Abd + Pelvis: Thrombus within the infrarenal IVC extends into the left common iliac vein and left external iliac vein to the left common femoral vein.  CT Chest: Bilateral pulmonary emboli.  s/p LLE-iliocaval DVT thrombectomy w/ post thrombectomy venogram demonstrates successful thrombectomy with no evidence of residual thrombus burden.    Plan  -on xoralto   -Outpatient heme follow up as patient is declining further workup heme workup inpatient or heme consult. The importance of having this workup was extensively discussed with the patient but he is not willing to stay at the hospital for this.

## 2021-09-01 NOTE — PROGRESS NOTE ADULT - ATTENDING COMMENTS
pt refusing inpatient hypercoagulable work up  pt refusing inpt egd colon  pt requesting to fu with PMD Dr. Queen  pt aware to fu with heme, GI and vascular cardiology  pt requesting for DC to rehab

## 2021-09-01 NOTE — PROGRESS NOTE ADULT - PROBLEM SELECTOR PLAN 4
CT Abd and Pelvis: There are bilobar hypodense hepatic lesions.   The left lobe lesion the subtle area of peripheral enhancement and measures 3.5 x 2.8 cm.  The right lobe lesion measures 1.1 x 1.0 cm.  There is an additional lesion in the caudate lobe of the liver. The lesion measures 1.4 x 1.2 cm.  CEA: wnl; CA 19-9: neg; acute hepatitis panel neg; LMK neg   alpha-antitrypsin: slightly elevated; ceruloplasmin: elevated     Plan:   - Hepatology following apprec recs  - F/u further hepatology labs  - F/U MRI Abd CT Abd and Pelvis: There are bilobar hypodense hepatic lesions.   The left lobe lesion the subtle area of peripheral enhancement and measures 3.5 x 2.8 cm.  The right lobe lesion measures 1.1 x 1.0 cm.  There is an additional lesion in the caudate lobe of the liver. The lesion measures 1.4 x 1.2 cm.  CEA: wnl; CA 19-9: neg; acute hepatitis panel neg; LMK neg   alpha-antitrypsin: slightly elevated; ceruloplasmin: elevated  MRI: Liver lesions appears like hemangiomas which are decreased in size from prior CT from 2011.

## 2021-09-17 ENCOUNTER — APPOINTMENT (OUTPATIENT)
Dept: HEMATOLOGY ONCOLOGY | Facility: CLINIC | Age: 71
End: 2021-09-17

## 2021-09-17 RX ORDER — RIVAROXABAN 15 MG-20MG
1 KIT ORAL
Qty: 30 | Refills: 0
Start: 2021-09-17 | End: 2021-10-16

## 2021-10-15 ENCOUNTER — APPOINTMENT (OUTPATIENT)
Dept: CARDIOLOGY | Facility: CLINIC | Age: 71
End: 2021-10-15

## 2022-01-28 NOTE — PROGRESS NOTE ADULT - PROBLEM SELECTOR PLAN 5
Total bili 1.5, indirect bili 1.1, thrombocytopenia 97K, Hg 17    Plan:   -Monitor was on BPH meds d/c'd use states tried for 2 months and was not working. Plans to d/w Dr. Fink on 11/16/2021 was on BPH meds d/c'd use states tried for 2 months and was not working.

## 2022-03-31 ENCOUNTER — INPATIENT (INPATIENT)
Facility: HOSPITAL | Age: 72
LOS: 10 days | Discharge: NURSING, REHAB OR LONG-TERM CARE | DRG: 897 | End: 2022-04-11
Attending: STUDENT IN AN ORGANIZED HEALTH CARE EDUCATION/TRAINING PROGRAM | Admitting: STUDENT IN AN ORGANIZED HEALTH CARE EDUCATION/TRAINING PROGRAM
Payer: COMMERCIAL

## 2022-03-31 VITALS
OXYGEN SATURATION: 97 % | WEIGHT: 175.05 LBS | RESPIRATION RATE: 18 BRPM | DIASTOLIC BLOOD PRESSURE: 81 MMHG | TEMPERATURE: 98 F | HEIGHT: 70 IN | HEART RATE: 97 BPM | SYSTOLIC BLOOD PRESSURE: 148 MMHG

## 2022-03-31 DIAGNOSIS — F10.929 ALCOHOL USE, UNSPECIFIED WITH INTOXICATION, UNSPECIFIED: ICD-10-CM

## 2022-03-31 LAB
ALBUMIN SERPL ELPH-MCNC: 4.7 G/DL — SIGNIFICANT CHANGE UP (ref 3.3–5)
ALP SERPL-CCNC: 66 U/L — SIGNIFICANT CHANGE UP (ref 40–120)
ALT FLD-CCNC: 137 U/L — HIGH (ref 10–45)
AMMONIA BLD-MCNC: 20 UMOL/L — SIGNIFICANT CHANGE UP (ref 11–55)
ANION GAP SERPL CALC-SCNC: 20 MMOL/L — HIGH (ref 5–17)
ANION GAP SERPL CALC-SCNC: 22 MMOL/L — HIGH (ref 5–17)
APTT BLD: 29.4 SEC — SIGNIFICANT CHANGE UP (ref 27.5–35.5)
AST SERPL-CCNC: 220 U/L — HIGH (ref 10–40)
BASOPHILS # BLD AUTO: 0.11 K/UL — SIGNIFICANT CHANGE UP (ref 0–0.2)
BASOPHILS NFR BLD AUTO: 1.7 % — SIGNIFICANT CHANGE UP (ref 0–2)
BILIRUB SERPL-MCNC: 0.8 MG/DL — SIGNIFICANT CHANGE UP (ref 0.2–1.2)
BUN SERPL-MCNC: 12 MG/DL — SIGNIFICANT CHANGE UP (ref 7–23)
BUN SERPL-MCNC: 14 MG/DL — SIGNIFICANT CHANGE UP (ref 7–23)
CALCIUM SERPL-MCNC: 8.6 MG/DL — SIGNIFICANT CHANGE UP (ref 8.4–10.5)
CALCIUM SERPL-MCNC: 9.5 MG/DL — SIGNIFICANT CHANGE UP (ref 8.4–10.5)
CHLORIDE SERPL-SCNC: 100 MMOL/L — SIGNIFICANT CHANGE UP (ref 96–108)
CHLORIDE SERPL-SCNC: 100 MMOL/L — SIGNIFICANT CHANGE UP (ref 96–108)
CK SERPL-CCNC: 108 U/L — SIGNIFICANT CHANGE UP (ref 30–200)
CO2 SERPL-SCNC: 18 MMOL/L — LOW (ref 22–31)
CO2 SERPL-SCNC: 21 MMOL/L — LOW (ref 22–31)
CREAT SERPL-MCNC: 0.75 MG/DL — SIGNIFICANT CHANGE UP (ref 0.5–1.3)
CREAT SERPL-MCNC: 0.82 MG/DL — SIGNIFICANT CHANGE UP (ref 0.5–1.3)
EGFR: 93 ML/MIN/1.73M2 — SIGNIFICANT CHANGE UP
EGFR: 96 ML/MIN/1.73M2 — SIGNIFICANT CHANGE UP
EOSINOPHIL # BLD AUTO: 0.12 K/UL — SIGNIFICANT CHANGE UP (ref 0–0.5)
EOSINOPHIL NFR BLD AUTO: 1.8 % — SIGNIFICANT CHANGE UP (ref 0–6)
GAS PNL BLDV: SIGNIFICANT CHANGE UP
GAS PNL BLDV: SIGNIFICANT CHANGE UP
GLUCOSE SERPL-MCNC: 105 MG/DL — HIGH (ref 70–99)
GLUCOSE SERPL-MCNC: 112 MG/DL — HIGH (ref 70–99)
HCT VFR BLD CALC: 50.7 % — HIGH (ref 39–50)
HGB BLD-MCNC: 17.6 G/DL — HIGH (ref 13–17)
IMM GRANULOCYTES NFR BLD AUTO: 0.5 % — SIGNIFICANT CHANGE UP (ref 0–1.5)
INR BLD: 0.93 RATIO — SIGNIFICANT CHANGE UP (ref 0.88–1.16)
LYMPHOCYTES # BLD AUTO: 0.95 K/UL — LOW (ref 1–3.3)
LYMPHOCYTES # BLD AUTO: 14.6 % — SIGNIFICANT CHANGE UP (ref 13–44)
MAGNESIUM SERPL-MCNC: 1.9 MG/DL — SIGNIFICANT CHANGE UP (ref 1.6–2.6)
MCHC RBC-ENTMCNC: 34.7 GM/DL — SIGNIFICANT CHANGE UP (ref 32–36)
MCHC RBC-ENTMCNC: 35 PG — HIGH (ref 27–34)
MCV RBC AUTO: 100.8 FL — HIGH (ref 80–100)
MONOCYTES # BLD AUTO: 0.89 K/UL — SIGNIFICANT CHANGE UP (ref 0–0.9)
MONOCYTES NFR BLD AUTO: 13.7 % — SIGNIFICANT CHANGE UP (ref 2–14)
NEUTROPHILS # BLD AUTO: 4.41 K/UL — SIGNIFICANT CHANGE UP (ref 1.8–7.4)
NEUTROPHILS NFR BLD AUTO: 67.7 % — SIGNIFICANT CHANGE UP (ref 43–77)
NRBC # BLD: 0 /100 WBCS — SIGNIFICANT CHANGE UP (ref 0–0)
PHOSPHATE SERPL-MCNC: 3.2 MG/DL — SIGNIFICANT CHANGE UP (ref 2.5–4.5)
PLATELET # BLD AUTO: 140 K/UL — LOW (ref 150–400)
POTASSIUM SERPL-MCNC: 4.1 MMOL/L — SIGNIFICANT CHANGE UP (ref 3.5–5.3)
POTASSIUM SERPL-MCNC: 4.7 MMOL/L — SIGNIFICANT CHANGE UP (ref 3.5–5.3)
POTASSIUM SERPL-SCNC: 4.1 MMOL/L — SIGNIFICANT CHANGE UP (ref 3.5–5.3)
POTASSIUM SERPL-SCNC: 4.7 MMOL/L — SIGNIFICANT CHANGE UP (ref 3.5–5.3)
PROT SERPL-MCNC: 7.8 G/DL — SIGNIFICANT CHANGE UP (ref 6–8.3)
PROTHROM AB SERPL-ACNC: 10.8 SEC — SIGNIFICANT CHANGE UP (ref 10.5–13.4)
RBC # BLD: 5.03 M/UL — SIGNIFICANT CHANGE UP (ref 4.2–5.8)
RBC # FLD: 13.9 % — SIGNIFICANT CHANGE UP (ref 10.3–14.5)
SODIUM SERPL-SCNC: 140 MMOL/L — SIGNIFICANT CHANGE UP (ref 135–145)
SODIUM SERPL-SCNC: 141 MMOL/L — SIGNIFICANT CHANGE UP (ref 135–145)
WBC # BLD: 6.51 K/UL — SIGNIFICANT CHANGE UP (ref 3.8–10.5)
WBC # FLD AUTO: 6.51 K/UL — SIGNIFICANT CHANGE UP (ref 3.8–10.5)

## 2022-03-31 PROCEDURE — 72170 X-RAY EXAM OF PELVIS: CPT | Mod: 26

## 2022-03-31 PROCEDURE — 93010 ELECTROCARDIOGRAM REPORT: CPT | Mod: NC

## 2022-03-31 PROCEDURE — 71260 CT THORAX DX C+: CPT | Mod: 26,MA

## 2022-03-31 PROCEDURE — 70450 CT HEAD/BRAIN W/O DYE: CPT | Mod: 26,MA

## 2022-03-31 PROCEDURE — 72125 CT NECK SPINE W/O DYE: CPT | Mod: 26,MA

## 2022-03-31 PROCEDURE — 99285 EMERGENCY DEPT VISIT HI MDM: CPT | Mod: 25

## 2022-03-31 PROCEDURE — 71045 X-RAY EXAM CHEST 1 VIEW: CPT | Mod: 26

## 2022-03-31 PROCEDURE — 74177 CT ABD & PELVIS W/CONTRAST: CPT | Mod: 26,MA

## 2022-03-31 RX ORDER — SODIUM CHLORIDE 9 MG/ML
1000 INJECTION, SOLUTION INTRAVENOUS ONCE
Refills: 0 | Status: COMPLETED | OUTPATIENT
Start: 2022-03-31 | End: 2022-03-31

## 2022-03-31 RX ORDER — SODIUM CHLORIDE 9 MG/ML
1000 INJECTION, SOLUTION INTRAVENOUS
Refills: 0 | Status: DISCONTINUED | OUTPATIENT
Start: 2022-03-31 | End: 2022-04-01

## 2022-03-31 RX ORDER — THIAMINE MONONITRATE (VIT B1) 100 MG
500 TABLET ORAL ONCE
Refills: 0 | Status: COMPLETED | OUTPATIENT
Start: 2022-03-31 | End: 2022-03-31

## 2022-03-31 RX ORDER — SODIUM CHLORIDE 9 MG/ML
1000 INJECTION INTRAMUSCULAR; INTRAVENOUS; SUBCUTANEOUS ONCE
Refills: 0 | Status: COMPLETED | OUTPATIENT
Start: 2022-03-31 | End: 2022-03-31

## 2022-03-31 RX ORDER — TETANUS TOXOID, REDUCED DIPHTHERIA TOXOID AND ACELLULAR PERTUSSIS VACCINE, ADSORBED 5; 2.5; 8; 8; 2.5 [IU]/.5ML; [IU]/.5ML; UG/.5ML; UG/.5ML; UG/.5ML
0.5 SUSPENSION INTRAMUSCULAR ONCE
Refills: 0 | Status: COMPLETED | OUTPATIENT
Start: 2022-03-31 | End: 2022-03-31

## 2022-03-31 RX ADMIN — Medication 1 MILLIGRAM(S): at 20:54

## 2022-03-31 RX ADMIN — Medication 1 MILLIGRAM(S): at 23:03

## 2022-03-31 RX ADMIN — Medication 30 MILLILITER(S): at 22:22

## 2022-03-31 RX ADMIN — SODIUM CHLORIDE 1000 MILLILITER(S): 9 INJECTION INTRAMUSCULAR; INTRAVENOUS; SUBCUTANEOUS at 20:51

## 2022-03-31 RX ADMIN — Medication 105 MILLIGRAM(S): at 20:02

## 2022-03-31 RX ADMIN — Medication 25 MILLIGRAM(S): at 21:29

## 2022-03-31 RX ADMIN — SODIUM CHLORIDE 1000 MILLILITER(S): 9 INJECTION, SOLUTION INTRAVENOUS at 18:06

## 2022-03-31 RX ADMIN — TETANUS TOXOID, REDUCED DIPHTHERIA TOXOID AND ACELLULAR PERTUSSIS VACCINE, ADSORBED 0.5 MILLILITER(S): 5; 2.5; 8; 8; 2.5 SUSPENSION INTRAMUSCULAR at 18:34

## 2022-03-31 NOTE — ED ADULT NURSE NOTE - ALCOHOL PRE SCREEN (AUDIT - C)
Patient:   AYESHA ROJAS            MRN: [id number]            FIN: [id number]              Age:   76 years     Sex:  MALE     :  43   Associated Diagnoses:   None   Author:   MASTER SEE     History of Present Illness             The patient presents with Patient seen and examined, chart reviewed. No acute events overnight. Denies scrotal pain at rest. Has some discomfort with manipulation..       Health Status   Allergies:    Allergic Reactions (All)  NKA   Current medications:  (Selected)   Inpatient Medications  Ordered  Cardura oral 4 mg tablet: 4 mg = 1 tab, PEG-tube, Q Bedtime, Routine, Order Start: 19 1:14:00 CDT, Tab  Piperacillin-Tazobactam (Zosyn) - Pharmacist to Dose: 3.375 gm, IVPB, Q8H, Rationale: Therapeutic (documented infection), Indication: Other infection (see order comments), RATE: 25 mL/hr, Infuse over 4 hr, 100 mL TOTAL Volume, Routine, Order Start: 19 8:00:00 CDT, x 7 days, Order Stop: 19 0:0...  Potassium ICU Protocol COMMUNICATION.: COMMUNICATION ORDER, Order Start: 19 9:00:00 CDT  Potassium non-ICU Protocol COMMUNICATION.: COMMUNICATION ORDER, Order Start: 19 9:00:00 CDT  Potassium non-ICU Protocol COMMUNICATION.: COMMUNICATION ORDER, Order Start: 19 9:00:00 CDT  Protonix (pantoprazole): 40 mg = 20 mL, PEG-tube, Daily, Order Start: 19 9:00:00 CDT, Suspension  Synthroid: 88 mcg = 1 tab, PEG-tube, QAM at 6, Routine, Order Start: 19 11:08:00 CDT, Tab  Zocor oral 40 mg tablet: 40 mg = 1 tab, PEG-tube, Q Bedtime, Routine, Order Start: 19 1:15:00 CDT, Tab  acetaminophen oral 325 mg tablet (Tylenol): 650 mg = 20.3 mL, PEG-tube, Q6H, PRN pain mild, Routine, Order Start: 19 19:57:00 CDT, Liquid  acetaminophen-hydrocodone oral 325-7.5 mg/15 mL solution (Hycet): 7.5 mg = 15 mL, Oral, Q6H, PRN abdominal pain, Routine, Order Start: 19 8:55:00 CDT, Solution, [7.5 mg HYDROcodone/15 mL]  albuterol inhalation 0.083% 2.5  mg/3 mL (Proventil).: 2.5 mg = 3 mL, Nebulizer, BID, Routine, Order Start: 07/24/19 17:00:00 CDT, Inhalation  albuterol inhalation 0.083% 2.5 mg/3 mL (Proventil).: 2.5 mg = 3 mL, Nebulizer, Q6H, PRN shortness of breath or wheezing, Routine, Order Start: 07/20/19 6:47:00 CDT, Inhalation  atropine.: 0.5 mg = 5 mL, IV Push, As Directed PRN, PRN Other (see order comments), Routine, Order Start: 07/21/19 7:51:00 CDT, Injection, For symptomatic bradycardia (Heart rate sustained ,40bpm and symptoms present) May repeat x1 if symptoms persist  clonazePAM oral 0.5 mg tablet: 0.25 mg = 0.5 tab, PEG-tube, Q12H, Routine, Order Start: 07/20/19 1:30:00 CDT, Tab  clopidogrel oral 75 mg tablet: 75 mg = 1 tab, PEG-tube, Daily, Routine, Order Start: 07/20/19 10:34:00 CDT, Tab  dextrose (glucose) injection 50%.: 12.5 gm = 25 mL, IV Push, As Directed PRN, PRN low blood glucose, Routine, Order Start: 07/24/19 7:33:00 CDT, Injection, Give 12.5 grams first.  If still needed give an additional 12.5 grams.  dextrose (glucose) oral.: 15 gm, Oral, As Directed PRN, PRN low blood glucose, Routine, Order Start: 07/24/19 7:33:00 CDT, Gel  glucagon (GlucaGen).: 1 mg = 1 mL, IM, As Directed PRN, PRN low blood glucose, Routine, Order Start: 07/24/19 7:33:00 CDT, Injection  insulin lispro (HumaLOG) dose correction.: MEDIUM DOSE Sliding Scale, Subcutaneous, Q6H, Routine, Order Start: 07/24/19 12:00:00 CDT, Injection, MEDIUM DOSE -  Less than 150 mg/dL- No Insulin  150-199 mg/dL    - 2 unit  200-249 mg/dL    - 4 units  250-299 mg/dL    - 6 units  300-349 mg/dL    -...  metoCLOPramide injection 5 mg/mL (Reglan): 5 mg = 1 mL, Slow IV Push, Q6H, PRN nausea or vomiting, Routine, Order Start: 07/24/19 9:03:00 CDT, Injection  morphine injection: 2 mg = 1 mL, IV Push, Q2H, PRN pain severe, Routine, Order Start: 07/24/19 1:34:00 CDT, Injection  nitroGLYCERIN sublingual (Nitrostat).: 0.4 mg = 1 tab, SL, As Directed PRN, PRN Other (see order comments), Routine,  Order Start: 07/21/19 7:51:00 CDT, Tab SL, May repeat every 5 minutes x2 for persistent chest pain  ondansetron injection 2 mg/mL (Zofran): 4 mg = 2 mL, Slow IV Push, Q6H, PRN nausea or vomiting, Routine, Order Start: 07/24/19 9:03:00 CDT, Injection  sertraline oral 100 mg tablet: 100 mg = 1 tab, PEG-tube, Daily, Routine, Order Start: 07/20/19 1:00:00 CDT, Tab  sucralfate oral 1,000 mg/10 mL suspension: 1,000 mg = 10 mL, PEG-tube, QID [before meals & HS], Routine, Order Start: 07/20/19 11:00:00 CDT, Suspension  Prescriptions  Prescribed  clonazePAM oral 0.25 mg disintegrating tablet (KlonoPIN Wafer): 0.25 mg = 1 tab, PEG-tube, Q12H, Tab Disintegrating, # 10 tab, 0 Refills, Maintenance  Documented Medications  Documented  Cardura oral 4 mg tablet: 4 mg = 1 tab, PEG-tube, Q Bedtime, Tab, Maintenance  albuterol 90 mcg/inh inhalation powder: = 2 puff, Inhaled, Q4H, PRN as needed for shortness of breath or wheezing, Maintenance  clopidogrel oral 75 mg tablet: 75 mg = 1 tab, PEG-tube, Daily, Tab, Maintenance  levothyroxine 88 mcg (0.088 mg) oral tablet: 88 mcg = 1 tab, G-tube, QAM at 6, Tab, Maintenance  pantoprazole oral suspension (Protonix): 40 mg, G-tube, Daily [before breakfast], Maintenance  sertraline oral 100 mg tablet: 100 mg = 1 tab, PEG-tube, Daily, Maintenance  simvastatin oral 40 mg tablet: 40 mg = 1 tab, PEG-tube, Q Bedtime, Maintenance  sucralfate oral 1,000 mg/10 mL suspension: 1,000 mg = 10 mL, PEG-tube, QID [before meals & HS], Suspension, Maintenance,   Medications (25) Active  Scheduled: (14)  *Potassium Protocol Communication  1 each, N/A, Daily  *Potassium Protocol Communication  1 each, N/A, Daily  *Potassium Protocol Communication  1 each, N/A, Daily  Albuterol 0.083% 2.5 mg/3 mL nebulizer soln  2.5 mg 3 mL, Nebulizer, BID  ClonazePAM 0.5 mg tab  0.25 mg 0.5 tab, PEG-tube, Q12H  Clopidogrel 75 mg tab  75 mg 1 tab, PEG-tube, Daily  Doxazosin 4 mg tab  4 mg 1 tab, PEG-tube, Q Bedtime  Insulin human  lispro 100 unit/mL inj 3 mL BULK  MEDIUM DOSE Sliding Scale, Subcutaneous, Q6H  Levothyroxine 88 mcg tab  88 mcg 1 tab, PEG-tube, QAM at 6  Pantoprazole 40 mg/20 mL (2 mg/mL) oral susp repack  40 mg 20 mL, PEG-tube, Daily  piperacillin-tazobactam  3.375 gm, IVPB, Q8H  Sertraline 100 mg tab  100 mg 1 tab, PEG-tube, Daily  Simvastatin 40 mg tab  40 mg 1 tab, PEG-tube, Q Bedtime  Sucralfate 1,000 mg/10 mL oral susp repack  1,000 mg 10 mL, PEG-tube, QID [before meals & HS]  Continuous: (0)  PRN: (11)  Acetaminophen 650 mg/20.3 mL oral liquid UD  650 mg 20.3 mL, PEG-tube, Q6H  Albuterol 0.083% 2.5 mg/3 mL nebulizer soln  2.5 mg 3 mL, Nebulizer, Q6H  Atropine 1 mg/10 mL syringe SDV  0.5 mg 5 mL, IV Push, As Directed PRN  Dextrose (glucose) 40% 15 gm/37.5 gm oral gel UD  15 gm, Oral, As Directed PRN  Dextrose (glucose) 50% 25 gm/50 mL syringe  12.5 gm 25 mL, IV Push, As Directed PRN  Glucagon 1 mg/1 mL emergency kit SDV  1 mg 1 mL, IM, As Directed PRN  Hydrocodone-acetaminophen 7.5-325 mg/15 mL oral soln UD  7.5 mg 15 mL, Oral, Q6H  MetoCLOPramide 10 mg/2 mL inj SDV  5 mg 1 mL, Slow IV Push, Q6H  Morphine PF 2 mg/1 mL inj SDV  2 mg 1 mL, IV Push, Q2H  NitroGLYCERIN 0.4 mg SL tab 25s  0.4 mg 1 tab, SL, As Directed PRN  Ondansetron 4 mg/2 mL inj SDV  4 mg 2 mL, Slow IV Push, Q6H      Physical Examination   VS/Measurements     Vitals between:   27-JUL-2019 11:52:44   TO   28-JUL-2019 11:52:44                   LAST RESULT MINIMUM MAXIMUM  Temperature 36.7 36.4 36.8  Heart Rate 86 58 86  Respiratory Rate 18 16 24  NISBP           136 114 136  NIDBP           81 67 81  SpO2                    96 94 98    General:  No acute distress.    HENT:  Normocephalic.    Neck:  Supple.    Respiratory:  Respirations are non-labored, Symmetrical chest wall expansion.   Cardiovascular:  Normal rate, Regular rhythm, Normal peripheral perfusion.   Gastrointestinal:  Soft, Non-tender, Non-distended.    Genitourinary:  No costovertebral angle  Statement Selected tenderness, No Suprapubic fullness or tenderness  16 [place] Foote draining clear yellow urine.  Penile and scrotal exam stable with the exception of additional tracking of the ecchymosis into the right groin. .   Integumentary:  Warm, Dry.    Neurologic:  Alert.    Psychiatric:  Cooperative.      Review / Management   Laboratory results:     Labs between:  27-JUL-2019 11:52 to 28-JUL-2019 11:52  CBC:                 WBC  HgB  Hct  Plt  MCV  RDW   28-JUL-2019 8.4  (L) 9.0  (L) 26.7  205  87.8  13.5   27-JUL-2019   (L) 8.3  (L) 24.6         DIFF:                 Seg  Neutroph//ABS  Lymph//ABS  Mono//ABS  EOS/ABS  28-JUL-2019 82  // 6.9 // (L) 0.8  // 0.8 // (L) 0.0   BMP:                 Na  Cl  BUN  Glu   28-JUL-2019 141  (H) 108  10  98                              K  CO2  Cr  Ca                              3.5  29  (L) 0.59  8.6   CMP:                 AST  ALT  AlkPhos  Bili  Albumin   28-JUL-2019 (H) 44  42  91  (H) 1.6  (L) 2.6   POC GLU:                 Latest Result  Latest Date  Minimum  Min Date  Maximum  Max Date                             (H) 102  28-JUL-2019 (H) 102  28-JUL-2019 (H) 125  27-JUL-2019                 .    Radiology results   Radiologist's interpretation : Radiology   07/27/19 15:52 CDT       CT ABDOMEN AND PELVIS W CON               RADRPT    ,   FINDINGS: Small bilateral pleural effusions are identified with associated airspace opacities.  The opacities probably represent atelectasis.  This is increased since the prior examination.  New hemorrhagic density fluid is identified within the abdomen.  This probably is from patient's recent surgery.  The overall amount of fluid is mild to moderate in quantity.  Continued follow-up is advised.  The fluid is most notable about the the spleen.  Small mass perceived to represent an adrenal nodule on the previous examination appears to follow spleen on both pre and post IV contrast.  It probably actually represents a small splenule.  Otherwise, the  liver, spleen, pancreas, gallbladder, adrenal glands and kidneys are essentially stable from the prior examination.  Nonenhancing lesions within the liver probably represent cysts.  Several are too small to adequately characterize.   Specifically, no obvious traumatic injury of the spleen is noted.  Surgical changes of the right lower quadrant are noted with the previous inflamed appendix no longer seen.  Gastrostomy tube unchanged.  The bowel is normal caliber without evidence of obstruction.  No free intraperitoneal air identified.  No significant lymphadenopathy is present.  The vascular structures are within normal limits.  A small urachal remnant is noted at the anterior dome of the urinary bladder.  Foote catheter decompresses the urinary bladder.  No significant pelvic fluid is detected.  Scattered edema is noted within the subcutaneous tissues, potential representing early anasarca.  Edema noted within the soft tissues of the scrotum.  IMPRESSION:  1.  Evidence of interval appendectomy with a mild to moderate amount of new hemorrhagic density ascites present.  Continued clinical and imaging follow-up is recommended.  Source of the suspected hemorrhage is uncertain.  2.  Increasing bilateral pleural effusions with associated atelectasis and suspected developing anasarca.  3.  Previously reported potential left adrenal nodule appears likely to represent an accessory splenule on this examination.  Images and report reviewed.      Impression and Plan   Dx and Plan:  Diagnosis     76-year-old male  status post open appendectomy on July 23.  Now with scrotal swelling and ecchymosis.  Hemoglobin has trended down since surgery requiring blood transfusion.  -CT scan without evidence of necrotizing infection. Genital skin changes secondary to peritoneal hematoma tracking into the scrotum. Will improve with time. Continue scrotal support and elevation. No further  intervention at this time.  Thank you for this  consult  Brando Salamanca DO  [facility]  [address] [place] [address]  Office [phone].     .

## 2022-03-31 NOTE — ED PROVIDER NOTE - NS ED ROS FT
Gen: No fever   Head: +fall +head trauma  Eyes: No changes in vision   Resp: No cough    Cardiovascular: No chest pain    Gastroenteric: No N/V/D  :  No change in urine output, dysuria or hematuria   MS: No joint or muscle pain  Neuro: No headache   Skin: No new rash

## 2022-03-31 NOTE — ED PROVIDER NOTE - ATTENDING CONTRIBUTION TO CARE
attending German: 72yM h/o DVT (Sept 2021, on Xarelto as per EMR), alcohol use disorder (recent hospitalization for alcohol withdrawal symptoms of seizures and delirium) BIBEMS after being down at the bottom of the staircase on his back. Pt intoxicated, wife at home also intoxicated. Exam as above. Will obtain ekg, labs, CT and xray imaging eval for traumatic injury, review prior hospitalization records, reassess.

## 2022-03-31 NOTE — ED PROVIDER NOTE - CLINICAL SUMMARY MEDICAL DECISION MAKING FREE TEXT BOX
71M with PMH of DVT (Sept 2021), alcohol use disorder (recent hospitalization for alcohol withdrawal symptoms of seizures and delirium) presenting after being down at the bottom of the staircase on his back. L forearm with skin tear. No other evidence of trauma. Pt verbal, alert, intoxicated. Plan to obtain CT head r/o ICH, obtain blood glucose, reassess.

## 2022-03-31 NOTE — ED ADULT NURSE NOTE - DOES PATIENT HAVE ADVANCE DIRECTIVE
Mother calls in for covid results  Informed not detected     Emili Sears RN  09/24/21 [id number]
Spontaneous, unlabored and symmetrical
No

## 2022-03-31 NOTE — ED ADULT NURSE NOTE - OBJECTIVE STATEMENT
Received pt in assigned area a&Xo3, pt intoxicated as per EMS, wife called 911 b/c pt fell down steps at home, upon arrival of EMS, pt found on his back at the bottom of the steps, able to ambulate with assistance,  skin tear noted to left forearm, dressing intact, no active bleeding noted  patient denies any fall today, dried blood noted to bridge of nose, able to move all extremities, resps even and non labored, pt has no complaints of pain, admits to drinking alcohol today, pt in lowest position and safety maintained.

## 2022-03-31 NOTE — ED PROVIDER NOTE - PHYSICAL EXAMINATION
G: NAD, intoxicated, places mask over his eyes, cooperative with exam   H: NCAT  E: EOMI   M: Mucous membranes moist   R: CTABL, nWOB  C: Nl S1/S2, no mrg  A: Soft, NT/ND, no rebound/guarding   MSK: no calf tenderness, no LE edema. L forearm with skin tear

## 2022-03-31 NOTE — ED PROVIDER NOTE - NSICDXFAMILYHX_GEN_ALL_CORE_FT
FAMILY HISTORY:  No pertinent family history in first degree relatives     You can access the FollowMyHealth Patient Portal offered by [facility] by registering at the following website: [email or web address] By joining [email or web address]’s FollowMyHealth portal, you will also be able to view your health information using other applications (apps) compatible with our system.

## 2022-03-31 NOTE — ED ADULT NURSE REASSESSMENT NOTE - NS ED NURSE REASSESS COMMENT FT1
Report received from RODY García at [address] in [place]. CIWA score 1. admitted to tele- sinus tach noted on monitor. currently on 1:1 for safety. awaiting bed assignment.

## 2022-03-31 NOTE — ED PROVIDER NOTE - CARE PLAN
1 Principal Discharge DX:	Alcohol intoxication  Secondary Diagnosis:	Fall down stairs   Principal Discharge DX:	Alcohol intoxication  Secondary Diagnosis:	Fall down stairs  Secondary Diagnosis:	Alcohol withdrawal

## 2022-03-31 NOTE — ED PROVIDER NOTE - PROGRESS NOTE DETAILS
Cori Florian DO PGY-3: pt received as a signout at 7pm. Noted to be withdrawing. Meds given. Mentating well. Discussed case with hospitalist - states the patient will be admitted to [facility]. Discussed patient is having alcohol withdrawal and generally the admission to the hospitalist - states they will manage the patient overnight but patient will be admitted to [facility]. [facility] is paged. Cori Florian,  PGY-3: spoke with [facility] agrees with admission  Complaining of anxiety, has hand tremors.

## 2022-03-31 NOTE — ED ADULT NURSE REASSESSMENT NOTE - NS ED NURSE REASSESS COMMENT FT1
pt resting in stretcher, LR bolus in progress, safety remains maintained, pt on constant observation for safety reasons, be remains in lowest position, side rails up,  awaiting medication from pharmacy pt resting in stretcher, LR bolus in progress, safety remains maintained, pt on a 1:1 for safety reasons, be remains in lowest position, side rails up,  awaiting medication from pharmacy

## 2022-03-31 NOTE — ED PROVIDER NOTE - OBJECTIVE STATEMENT
71M with PMH of DVT (Sept 2021), alcohol use disorder (recent hospitalization for alcohol withdrawal symptoms of seizures and delirium) presenting after being down at the bottom of the staircase on his back. Per EMS pt was intoxicated at that time. Wife with him also intoxicated. Wife called, states that he fell down the stairs. Unk how many stairs. Pt denies falls. Denies any pain. Skin tear to L forearm.

## 2022-04-01 DIAGNOSIS — Z02.9 ENCOUNTER FOR ADMINISTRATIVE EXAMINATIONS, UNSPECIFIED: ICD-10-CM

## 2022-04-01 DIAGNOSIS — I82.409 ACUTE EMBOLISM AND THROMBOSIS OF UNSPECIFIED DEEP VEINS OF UNSPECIFIED LOWER EXTREMITY: Chronic | ICD-10-CM

## 2022-04-01 DIAGNOSIS — F10.239 ALCOHOL DEPENDENCE WITH WITHDRAWAL, UNSPECIFIED: ICD-10-CM

## 2022-04-01 DIAGNOSIS — Z86.718 PERSONAL HISTORY OF OTHER VENOUS THROMBOSIS AND EMBOLISM: ICD-10-CM

## 2022-04-01 DIAGNOSIS — R79.89 OTHER SPECIFIED ABNORMAL FINDINGS OF BLOOD CHEMISTRY: ICD-10-CM

## 2022-04-01 LAB
ALBUMIN SERPL ELPH-MCNC: 4.4 G/DL — SIGNIFICANT CHANGE UP (ref 3.3–5)
ALP SERPL-CCNC: 67 U/L — SIGNIFICANT CHANGE UP (ref 40–120)
ALT FLD-CCNC: 120 U/L — HIGH (ref 10–45)
ANION GAP SERPL CALC-SCNC: 13 MMOL/L — SIGNIFICANT CHANGE UP (ref 5–17)
APPEARANCE UR: CLEAR — SIGNIFICANT CHANGE UP
AST SERPL-CCNC: 159 U/L — HIGH (ref 10–40)
BACTERIA # UR AUTO: NEGATIVE — SIGNIFICANT CHANGE UP
BASE EXCESS BLDV CALC-SCNC: 0.2 MMOL/L — SIGNIFICANT CHANGE UP (ref -2–2)
BASE EXCESS BLDV CALC-SCNC: 2.1 MMOL/L — HIGH (ref -2–2)
BASOPHILS # BLD AUTO: 0.08 K/UL — SIGNIFICANT CHANGE UP (ref 0–0.2)
BASOPHILS NFR BLD AUTO: 1.8 % — SIGNIFICANT CHANGE UP (ref 0–2)
BILIRUB DIRECT SERPL-MCNC: 0.3 MG/DL — SIGNIFICANT CHANGE UP (ref 0–0.3)
BILIRUB INDIRECT FLD-MCNC: 0.8 MG/DL — SIGNIFICANT CHANGE UP (ref 0.2–1)
BILIRUB SERPL-MCNC: 1.1 MG/DL — SIGNIFICANT CHANGE UP (ref 0.2–1.2)
BILIRUB UR-MCNC: NEGATIVE — SIGNIFICANT CHANGE UP
BLOOD GAS VENOUS - CREATININE: SIGNIFICANT CHANGE UP MG/DL (ref 0.5–1.3)
BUN SERPL-MCNC: 11 MG/DL — SIGNIFICANT CHANGE UP (ref 7–23)
CA-I SERPL-SCNC: 1.06 MMOL/L — LOW (ref 1.15–1.33)
CA-I SERPL-SCNC: 1.09 MMOL/L — LOW (ref 1.15–1.33)
CALCIUM SERPL-MCNC: 8.8 MG/DL — SIGNIFICANT CHANGE UP (ref 8.4–10.5)
CHLORIDE BLDV-SCNC: 102 MMOL/L — SIGNIFICANT CHANGE UP (ref 96–108)
CHLORIDE BLDV-SCNC: 96 MMOL/L — SIGNIFICANT CHANGE UP (ref 96–108)
CHLORIDE SERPL-SCNC: 98 MMOL/L — SIGNIFICANT CHANGE UP (ref 96–108)
CO2 BLDV-SCNC: 25 MMOL/L — SIGNIFICANT CHANGE UP (ref 22–26)
CO2 BLDV-SCNC: 27 MMOL/L — HIGH (ref 22–26)
CO2 SERPL-SCNC: 25 MMOL/L — SIGNIFICANT CHANGE UP (ref 22–31)
COLOR SPEC: SIGNIFICANT CHANGE UP
CREAT SERPL-MCNC: 0.81 MG/DL — SIGNIFICANT CHANGE UP (ref 0.5–1.3)
DIFF PNL FLD: NEGATIVE — SIGNIFICANT CHANGE UP
EGFR: 94 ML/MIN/1.73M2 — SIGNIFICANT CHANGE UP
EOSINOPHIL # BLD AUTO: 0.01 K/UL — SIGNIFICANT CHANGE UP (ref 0–0.5)
EOSINOPHIL NFR BLD AUTO: 0.2 % — SIGNIFICANT CHANGE UP (ref 0–6)
EPI CELLS # UR: 0 /HPF — SIGNIFICANT CHANGE UP
GAS PNL BLDV: 131 MMOL/L — LOW (ref 136–145)
GAS PNL BLDV: 138 MMOL/L — SIGNIFICANT CHANGE UP (ref 136–145)
GAS PNL BLDV: SIGNIFICANT CHANGE UP
GLUCOSE BLDV-MCNC: 118 MG/DL — HIGH (ref 70–99)
GLUCOSE BLDV-MCNC: 142 MG/DL — HIGH (ref 70–99)
GLUCOSE SERPL-MCNC: 164 MG/DL — HIGH (ref 70–99)
GLUCOSE UR QL: NEGATIVE — SIGNIFICANT CHANGE UP
HCO3 BLDV-SCNC: 24 MMOL/L — SIGNIFICANT CHANGE UP (ref 22–29)
HCO3 BLDV-SCNC: 26 MMOL/L — SIGNIFICANT CHANGE UP (ref 22–29)
HCT VFR BLD CALC: 45.2 % — SIGNIFICANT CHANGE UP (ref 39–50)
HCT VFR BLDA CALC: 47 % — SIGNIFICANT CHANGE UP (ref 39–51)
HCT VFR BLDA CALC: 51 % — SIGNIFICANT CHANGE UP (ref 39–51)
HGB BLD CALC-MCNC: 15.8 G/DL — SIGNIFICANT CHANGE UP (ref 12.6–17.4)
HGB BLD CALC-MCNC: 16.9 G/DL — SIGNIFICANT CHANGE UP (ref 12.6–17.4)
HGB BLD-MCNC: 16 G/DL — SIGNIFICANT CHANGE UP (ref 13–17)
IMM GRANULOCYTES NFR BLD AUTO: 0.2 % — SIGNIFICANT CHANGE UP (ref 0–1.5)
KETONES UR-MCNC: NEGATIVE — SIGNIFICANT CHANGE UP
LACTATE BLDV-MCNC: 2.1 MMOL/L — HIGH (ref 0.7–2)
LACTATE BLDV-MCNC: 4.6 MMOL/L — CRITICAL HIGH (ref 0.7–2)
LACTATE SERPL-SCNC: 1.9 MMOL/L — SIGNIFICANT CHANGE UP (ref 0.7–2)
LEUKOCYTE ESTERASE UR-ACNC: NEGATIVE — SIGNIFICANT CHANGE UP
LYMPHOCYTES # BLD AUTO: 0.39 K/UL — LOW (ref 1–3.3)
LYMPHOCYTES # BLD AUTO: 8.8 % — LOW (ref 13–44)
MAGNESIUM SERPL-MCNC: 1.9 MG/DL — SIGNIFICANT CHANGE UP (ref 1.6–2.6)
MCHC RBC-ENTMCNC: 34.9 PG — HIGH (ref 27–34)
MCHC RBC-ENTMCNC: 35.4 GM/DL — SIGNIFICANT CHANGE UP (ref 32–36)
MCV RBC AUTO: 98.5 FL — SIGNIFICANT CHANGE UP (ref 80–100)
MONOCYTES # BLD AUTO: 0.73 K/UL — SIGNIFICANT CHANGE UP (ref 0–0.9)
MONOCYTES NFR BLD AUTO: 16.6 % — HIGH (ref 2–14)
NEUTROPHILS # BLD AUTO: 3.19 K/UL — SIGNIFICANT CHANGE UP (ref 1.8–7.4)
NEUTROPHILS NFR BLD AUTO: 72.4 % — SIGNIFICANT CHANGE UP (ref 43–77)
NITRITE UR-MCNC: NEGATIVE — SIGNIFICANT CHANGE UP
NRBC # BLD: 0 /100 WBCS — SIGNIFICANT CHANGE UP (ref 0–0)
PCO2 BLDV: 35 MMHG — LOW (ref 42–55)
PCO2 BLDV: 36 MMHG — LOW (ref 42–55)
PCP SPEC-MCNC: SIGNIFICANT CHANGE UP
PH BLDV: 7.44 — HIGH (ref 7.32–7.43)
PH BLDV: 7.46 — HIGH (ref 7.32–7.43)
PH UR: 7.5 — SIGNIFICANT CHANGE UP (ref 5–8)
PHOSPHATE SERPL-MCNC: 1.5 MG/DL — LOW (ref 2.5–4.5)
PLATELET # BLD AUTO: 112 K/UL — LOW (ref 150–400)
PO2 BLDV: 50 MMHG — HIGH (ref 25–45)
PO2 BLDV: 56 MMHG — HIGH (ref 25–45)
POTASSIUM BLDV-SCNC: 3.4 MMOL/L — LOW (ref 3.5–5.1)
POTASSIUM BLDV-SCNC: 3.7 MMOL/L — SIGNIFICANT CHANGE UP (ref 3.5–5.1)
POTASSIUM SERPL-MCNC: 4.2 MMOL/L — SIGNIFICANT CHANGE UP (ref 3.5–5.3)
POTASSIUM SERPL-SCNC: 4.2 MMOL/L — SIGNIFICANT CHANGE UP (ref 3.5–5.3)
PROCALCITONIN SERPL-MCNC: 0.08 NG/ML — SIGNIFICANT CHANGE UP (ref 0.02–0.1)
PROT SERPL-MCNC: 7 G/DL — SIGNIFICANT CHANGE UP (ref 6–8.3)
PROT UR-MCNC: ABNORMAL
RBC # BLD: 4.59 M/UL — SIGNIFICANT CHANGE UP (ref 4.2–5.8)
RBC # FLD: 13.7 % — SIGNIFICANT CHANGE UP (ref 10.3–14.5)
RBC CASTS # UR COMP ASSIST: 2 /HPF — SIGNIFICANT CHANGE UP (ref 0–4)
SAO2 % BLDV: 83.1 % — SIGNIFICANT CHANGE UP (ref 67–88)
SAO2 % BLDV: 88.4 % — HIGH (ref 67–88)
SARS-COV-2 RNA SPEC QL NAA+PROBE: SIGNIFICANT CHANGE UP
SODIUM SERPL-SCNC: 136 MMOL/L — SIGNIFICANT CHANGE UP (ref 135–145)
SP GR SPEC: 1.01 — SIGNIFICANT CHANGE UP (ref 1.01–1.02)
UROBILINOGEN FLD QL: NEGATIVE — SIGNIFICANT CHANGE UP
WBC # BLD: 4.41 K/UL — SIGNIFICANT CHANGE UP (ref 3.8–10.5)
WBC # FLD AUTO: 4.41 K/UL — SIGNIFICANT CHANGE UP (ref 3.8–10.5)
WBC UR QL: 0 /HPF — SIGNIFICANT CHANGE UP (ref 0–5)

## 2022-04-01 PROCEDURE — 71045 X-RAY EXAM CHEST 1 VIEW: CPT | Mod: 26

## 2022-04-01 PROCEDURE — 76700 US EXAM ABDOM COMPLETE: CPT | Mod: 26

## 2022-04-01 PROCEDURE — 99223 1ST HOSP IP/OBS HIGH 75: CPT

## 2022-04-01 RX ORDER — DEXTROSE 50 % IN WATER 50 %
25 SYRINGE (ML) INTRAVENOUS ONCE
Refills: 0 | Status: DISCONTINUED | OUTPATIENT
Start: 2022-04-01 | End: 2022-04-11

## 2022-04-01 RX ORDER — SODIUM CHLORIDE 9 MG/ML
1000 INJECTION, SOLUTION INTRAVENOUS
Refills: 0 | Status: DISCONTINUED | OUTPATIENT
Start: 2022-04-01 | End: 2022-04-11

## 2022-04-01 RX ORDER — ACETAMINOPHEN 500 MG
650 TABLET ORAL EVERY 6 HOURS
Refills: 0 | Status: DISCONTINUED | OUTPATIENT
Start: 2022-04-01 | End: 2022-04-11

## 2022-04-01 RX ORDER — FOLIC ACID 0.8 MG
1 TABLET ORAL DAILY
Refills: 0 | Status: DISCONTINUED | OUTPATIENT
Start: 2022-04-01 | End: 2022-04-11

## 2022-04-01 RX ORDER — SODIUM CHLORIDE 9 MG/ML
1000 INJECTION INTRAMUSCULAR; INTRAVENOUS; SUBCUTANEOUS
Refills: 0 | Status: DISCONTINUED | OUTPATIENT
Start: 2022-04-01 | End: 2022-04-11

## 2022-04-01 RX ORDER — DEXTROSE 50 % IN WATER 50 %
15 SYRINGE (ML) INTRAVENOUS ONCE
Refills: 0 | Status: DISCONTINUED | OUTPATIENT
Start: 2022-04-01 | End: 2022-04-11

## 2022-04-01 RX ORDER — MAGNESIUM SULFATE 500 MG/ML
1 VIAL (ML) INJECTION ONCE
Refills: 0 | Status: COMPLETED | OUTPATIENT
Start: 2022-04-01 | End: 2022-04-01

## 2022-04-01 RX ORDER — THIAMINE MONONITRATE (VIT B1) 100 MG
500 TABLET ORAL EVERY 8 HOURS
Refills: 0 | Status: COMPLETED | OUTPATIENT
Start: 2022-04-01 | End: 2022-04-02

## 2022-04-01 RX ORDER — AMLODIPINE BESYLATE 2.5 MG/1
5 TABLET ORAL DAILY
Refills: 0 | Status: DISCONTINUED | OUTPATIENT
Start: 2022-04-01 | End: 2022-04-03

## 2022-04-01 RX ORDER — THIAMINE MONONITRATE (VIT B1) 100 MG
250 TABLET ORAL DAILY
Refills: 0 | Status: COMPLETED | OUTPATIENT
Start: 2022-04-03 | End: 2022-04-07

## 2022-04-01 RX ORDER — DEXTROSE 50 % IN WATER 50 %
12.5 SYRINGE (ML) INTRAVENOUS ONCE
Refills: 0 | Status: DISCONTINUED | OUTPATIENT
Start: 2022-04-01 | End: 2022-04-11

## 2022-04-01 RX ORDER — GLUCAGON INJECTION, SOLUTION 0.5 MG/.1ML
1 INJECTION, SOLUTION SUBCUTANEOUS ONCE
Refills: 0 | Status: DISCONTINUED | OUTPATIENT
Start: 2022-04-01 | End: 2022-04-11

## 2022-04-01 RX ORDER — ACETAMINOPHEN 500 MG
650 TABLET ORAL ONCE
Refills: 0 | Status: COMPLETED | OUTPATIENT
Start: 2022-04-01 | End: 2022-04-01

## 2022-04-01 RX ADMIN — Medication 2 MILLIGRAM(S): at 18:52

## 2022-04-01 RX ADMIN — Medication 1 MILLIGRAM(S): at 03:58

## 2022-04-01 RX ADMIN — Medication 105 MILLIGRAM(S): at 21:30

## 2022-04-01 RX ADMIN — Medication 650 MILLIGRAM(S): at 16:19

## 2022-04-01 RX ADMIN — Medication 100 MILLIGRAM(S): at 03:16

## 2022-04-01 RX ADMIN — Medication 2 MILLIGRAM(S): at 14:37

## 2022-04-01 RX ADMIN — AMLODIPINE BESYLATE 5 MILLIGRAM(S): 2.5 TABLET ORAL at 13:37

## 2022-04-01 RX ADMIN — Medication 1 MILLIGRAM(S): at 15:26

## 2022-04-01 RX ADMIN — Medication 650 MILLIGRAM(S): at 21:29

## 2022-04-01 RX ADMIN — Medication 650 MILLIGRAM(S): at 20:48

## 2022-04-01 RX ADMIN — Medication 2 MILLIGRAM(S): at 06:28

## 2022-04-01 RX ADMIN — Medication 85 MILLIMOLE(S): at 10:18

## 2022-04-01 RX ADMIN — Medication 1 MILLIGRAM(S): at 12:07

## 2022-04-01 RX ADMIN — Medication 2 MILLIGRAM(S): at 10:06

## 2022-04-01 RX ADMIN — Medication 105 MILLIGRAM(S): at 06:12

## 2022-04-01 RX ADMIN — Medication 1 MILLIGRAM(S): at 01:35

## 2022-04-01 RX ADMIN — SODIUM CHLORIDE 60 MILLILITER(S): 9 INJECTION INTRAMUSCULAR; INTRAVENOUS; SUBCUTANEOUS at 16:21

## 2022-04-01 RX ADMIN — Medication 1 TABLET(S): at 12:07

## 2022-04-01 RX ADMIN — Medication 105 MILLIGRAM(S): at 15:07

## 2022-04-01 RX ADMIN — Medication 2 MILLIGRAM(S): at 21:30

## 2022-04-01 RX ADMIN — Medication 100 GRAM(S): at 12:09

## 2022-04-01 RX ADMIN — Medication 200 MILLIGRAM(S): at 09:09

## 2022-04-01 RX ADMIN — Medication 2 MILLIGRAM(S): at 09:27

## 2022-04-01 NOTE — H&P ADULT - HISTORY OF PRESENT ILLNESS
NIGHT HOSPITALIST:   Patient UNKNOWN to me previously, assigned to me at this point via the ER and by Dr. 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   Patient admits to liquor cocktails but refused to quantify amount for examiner.  Spouse also admitted to examiner that she also drinks ethanol with spouse, due to family recently with a cancer diagnosis.  Spouse denies domestic violence issues.   Patient with abrasion LEFT forearm.  Patient offers no complaint, but conversation desultory.  Patient awake, hypervigilant at times, but denies HA, focal weakness.  Denies arm pain, chest pain, abdominal pain, pelvic or leg pain.  Spouse reports that patient is concerned about his chronic DVT but it is not clear if patient is compliant with his direct anticoagulant.

## 2022-04-01 NOTE — H&P ADULT - PROBLEM SELECTOR PLAN 4
Transitions of Care Status:  1.  Name of PCP:   Saud Queen DO (PCP) [phone]  2.  PCP Contacted on Admission: [ ] Y    [ x] N    3.  PCP contacted at Discharge: [ ] Y    [ ] N    [ ] N/A  4.  Post-Discharge Appointment Date and Location:  5.  Summary of Handoff given to PCP:

## 2022-04-01 NOTE — H&P ADULT - NSHPADDITIONALINFOADULT_GEN_ALL_CORE
NIGHT HOSPITALIST:    Spouse aware of course and agrees with plan/care as above.  Given patient's comorbidities, patient's long term prognosis is guarded.  Emotional support provided to patient/ spouse.   Care reviewed with covering NP/PA for endorsement to the [facility].    Antoni Ken MD

## 2022-04-01 NOTE — H&P ADULT - PROBLEM SELECTOR PLAN 1
See above.   High risk CIWA with Ativan taper with rescue.  Social work.   Patient is NOT a candidate for chronic benzodiazepines with active alcohol abuse.  Thiamine with Wernicke protocol.  ON 1:1 for safety.

## 2022-04-01 NOTE — H&P ADULT - NSHPLABSRESULTS_GEN_ALL_CORE
WBC 6.5   normal differential.    Hgb 17.6    Platelets of 140K    Random glucose of 105  Cr 0.7  K+ 4.1          Lactate 5.9>>5.6>>4.6 likely from alcoholic source    COVID-19 PCR>>negative.        Chest radiograph reviewed with no infilrate or effusion.    Pelvis radiograph with no fracture.    CTT head/cervical>>no trauma or bleed.  CTT trunk with no trauma, +hepatic steatosis. WBC 6.5   normal differential.    Hgb 17.6    Platelets of 140K    Random glucose of 105  Cr 0.7  K+ 4.1          Lactate 5.9>>5.6>>4.6 likely from alcoholic source    COVID-19 PCR>>negative.        Chest radiograph reviewed with no infilrate or effusion.    Pelvis radiograph with no fracture.    CTT head/cervical>>no trauma or bleed.  CTT trunk with no trauma, +hepatic steatosis.    EKG tracing reviewed with NSR at 84. LAD.  QTc 458

## 2022-04-01 NOTE — H&P ADULT - REASON FOR ADMISSION
Found at the bottom of the stairs at home by spouse with patient intoxicated, with spouse also intoxicated per EMS

## 2022-04-01 NOTE — H&P ADULT - MENTAL STATUS
Alert to self/hospital/ date.   Poor short term recall and poor insight.  Desultory content by patient during interview by examiner.

## 2022-04-01 NOTE — PATIENT PROFILE ADULT - FALL HARM RISK - HARM RISK INTERVENTIONS
Assistance with ambulation/Assistance OOB with selected safe patient handling equipment/Communicate Risk of Fall with Harm to all staff/Monitor for mental status changes/Monitor gait and stability/Reinforce activity limits and safety measures with patient and family/Tailored Fall Risk Interventions/Toileting schedule using arm’s reach rule for commode and bathroom/Use of alarms - bed, chair and/or voice tab/Visual Cue: Yellow wristband and red socks/Bed in lowest position, wheels locked, appropriate side rails in place/Call bell, personal items and telephone in reach/Instruct patient to call for assistance before getting out of bed or chair/Non-slip footwear when patient is out of bed/[place] to call system/Physically safe environment - no spills, clutter or unnecessary equipment/Purposeful Proactive Rounding/Room/bathroom lighting operational, light cord in reach

## 2022-04-01 NOTE — H&P ADULT - PROBLEM SELECTOR PLAN 3
See above.  Will defer to the DAYTIME Provider review of patient's Xarelto as above. See above.  Will defer to the DAYTIME Provider review of patient's Xarelto as above.    Dopplers ordered.

## 2022-04-01 NOTE — H&P ADULT - ASSESSMENT
NIGHT HOSPITALIST:  S/P fall at home with negative trauma workup by the ER except for LEFT forearm abrasion in the setting of ethanol abuse and withdrawal with mild to moderate delirium>>patient with inadequate benzodiazepine course by the ER>>will provide CIWA high risk protocol but will have to use lorazepam due to patient's age and history of cirrhosis.  Will provide Ativan taper with also symptom control.   Will provide thiamine at Wernicke protocol as above.   1:1 for safety.    Patient is clearly NOT a candidate for home benzodiazepine (see [place] I Stop in chart) and would consider referral for inpatient detoxification off benzodiazepines altogether.  Social work.    Will check H/H and lactate this AM past IVF--suspect secondary erythrocytosis from dehydration but patient with mild HTN and tachycardia from withdrawal.    Will defer to the DAYTIME provider review of patient's Xarelto regimen with risk/benefit given fall from stairwell and with apparent active alcohol and benzodiazepine use.    NPO x medications until review by DAYTIME provider this AM.  Aspiration precautions.   Follow FS to monitor for hypoglycemia.

## 2022-04-01 NOTE — CONSULT NOTE ADULT - SUBJECTIVE AND OBJECTIVE BOX
Pt is a 72M admitted for mechanical fall. Hx of EtOH abuse. Admitted for withdrawal.    ID c/s for further evaluation of fisolated fever to 100.8F  Chart reviewed. Labs reviewed, no leukocytosis, +lactate.   CT C/A/P w/ hepatomegaly; US Abdo w/ hepatic steatosis.     A/P:  Suspect overall clinical picture 2/2 dehydration in the setting of EtOH abuse.   Imaging w/o obvious infection, would send UA/UCx and BCx x2  Can monitor off Abx, however if repeatedly febrile, low threshold to start on zosyn pending additional w/u.     Full consult note to follow by Dr. Jackie Holland  Infectious Diseases will continue to follow. Please call with any questions.   Katty Nguyen M.D.  [facility], Division of Infectious Diseases [phone]   Pt is a 72M admitted for mechanical fall. Hx of EtOH abuse. Admitted for withdrawal.    ID c/s for further evaluation of fisolated fever to 100.8F  Chart reviewed. Labs reviewed, no leukocytosis, +lactate.   CT C/A/P w/ hepatomegaly; US Abdo w/ hepatic steatosis.     A/P:  Suspect overall clinical picture 2/2 dehydration in the setting of EtOH abuse.   Imaging w/o obvious infection, would send UA/UCx and BCx x2  Can monitor off Abx, however if repeatedly febrile, low threshold to start on zosyn pending additional w/u.     Full consult note to follow in AM  Infectious Diseases will continue to follow. Please call with any questions.   Katty Nguyen M.D.  [facility], Division of Infectious Diseases [phone]

## 2022-04-01 NOTE — H&P ADULT - NSICDXPASTMEDICALHX_GEN_ALL_CORE_FT
PAST MEDICAL HISTORY:  ALC (alcoholic liver cirrhosis)     Deep venous thrombosis     History of alcohol use

## 2022-04-01 NOTE — H&P ADULT - NSHPSOCIALHISTORY_GEN_ALL_CORE
No tobacco.   Patient admits to liquor mixed drinks but refuses to quantify amount for examiner.   Spouse admits to examiner that she drinks herself.   Spouse denies domestic violence issues.

## 2022-04-01 NOTE — ED ADULT NURSE REASSESSMENT NOTE - NS ED NURSE REASSESS COMMENT FT1
Spoke with ACP Anige regarding CIWA score of 9 and patient reporting to feeling moderately agitated. awaiting medication orders. Spoke with ACP Angie regarding CIWA score of 9 and patient reporting to feeling moderately "agitated." awaiting medication orders.

## 2022-04-01 NOTE — H&P ADULT - NSHPSOURCEINFOTX_GEN_ALL_CORE
Unable to confirm full Medex from patient with history not reliable from patient. Unable to confirm full Medex from patient with history not reliable from patient.  Incomplete history from spouse by phone, spouse also admits to ethanol intake.  [facility] I STOP  # [phone] Unable to confirm full Medex from patient with history not reliable from patient.  Incomplete history from spouse by phone, Nay Winkler, [phone] spouse also admits to ethanol intake.  [facility] I STOP  # [phone]

## 2022-04-01 NOTE — H&P ADULT - NSHPREVIEWOFSYSTEMS_GEN_ALL_CORE
Partial ROS from patient and from spouse.    NO fever, no chills, no rigors.  No HA, no focal weakness.  NO chest pain/pressure.  NO cough, no hemoptysis.  NO back pain, no tearing back pain.    NO suicidal or homicidal ideation.  No dysuria, no hematuria.  NO anorexia or weight loss  NO rash.  NO joint pain.

## 2022-04-02 LAB
-  STAPHYLOCOCCUS EPIDERMIDIS: SIGNIFICANT CHANGE UP
A1C WITH ESTIMATED AVERAGE GLUCOSE RESULT: 5.6 % — SIGNIFICANT CHANGE UP (ref 4–5.6)
ALBUMIN SERPL ELPH-MCNC: 3.9 G/DL — SIGNIFICANT CHANGE UP (ref 3.3–5)
ALP SERPL-CCNC: 57 U/L — SIGNIFICANT CHANGE UP (ref 40–120)
ALT FLD-CCNC: 84 U/L — HIGH (ref 10–45)
ANION GAP SERPL CALC-SCNC: 16 MMOL/L — SIGNIFICANT CHANGE UP (ref 5–17)
AST SERPL-CCNC: 81 U/L — HIGH (ref 10–40)
BILIRUB SERPL-MCNC: 1.3 MG/DL — HIGH (ref 0.2–1.2)
BUN SERPL-MCNC: 8 MG/DL — SIGNIFICANT CHANGE UP (ref 7–23)
CALCIUM SERPL-MCNC: 8.4 MG/DL — SIGNIFICANT CHANGE UP (ref 8.4–10.5)
CHLORIDE SERPL-SCNC: 98 MMOL/L — SIGNIFICANT CHANGE UP (ref 96–108)
CO2 SERPL-SCNC: 21 MMOL/L — LOW (ref 22–31)
CREAT SERPL-MCNC: 0.83 MG/DL — SIGNIFICANT CHANGE UP (ref 0.5–1.3)
CULTURE RESULTS: SIGNIFICANT CHANGE UP
EGFR: 93 ML/MIN/1.73M2 — SIGNIFICANT CHANGE UP
ESTIMATED AVERAGE GLUCOSE: 114 MG/DL — SIGNIFICANT CHANGE UP (ref 68–114)
GLUCOSE SERPL-MCNC: 113 MG/DL — HIGH (ref 70–99)
GRAM STN FLD: SIGNIFICANT CHANGE UP
GRAM STN FLD: SIGNIFICANT CHANGE UP
HCT VFR BLD CALC: 44.8 % — SIGNIFICANT CHANGE UP (ref 39–50)
HCV AB S/CO SERPL IA: 0.11 S/CO — SIGNIFICANT CHANGE UP (ref 0–0.99)
HCV AB SERPL-IMP: SIGNIFICANT CHANGE UP
HGB BLD-MCNC: 15.8 G/DL — SIGNIFICANT CHANGE UP (ref 13–17)
LACTATE SERPL-SCNC: 1.6 MMOL/L — SIGNIFICANT CHANGE UP (ref 0.7–2)
MAGNESIUM SERPL-MCNC: 2 MG/DL — SIGNIFICANT CHANGE UP (ref 1.6–2.6)
MCHC RBC-ENTMCNC: 35.2 PG — HIGH (ref 27–34)
MCHC RBC-ENTMCNC: 35.3 GM/DL — SIGNIFICANT CHANGE UP (ref 32–36)
MCV RBC AUTO: 99.8 FL — SIGNIFICANT CHANGE UP (ref 80–100)
METHOD TYPE: SIGNIFICANT CHANGE UP
NRBC # BLD: 0 /100 WBCS — SIGNIFICANT CHANGE UP (ref 0–0)
PHOSPHATE SERPL-MCNC: 2.3 MG/DL — LOW (ref 2.5–4.5)
PLATELET # BLD AUTO: 96 K/UL — LOW (ref 150–400)
POTASSIUM SERPL-MCNC: 3.6 MMOL/L — SIGNIFICANT CHANGE UP (ref 3.5–5.3)
POTASSIUM SERPL-SCNC: 3.6 MMOL/L — SIGNIFICANT CHANGE UP (ref 3.5–5.3)
PROT SERPL-MCNC: 6.6 G/DL — SIGNIFICANT CHANGE UP (ref 6–8.3)
RBC # BLD: 4.49 M/UL — SIGNIFICANT CHANGE UP (ref 4.2–5.8)
RBC # FLD: 13.4 % — SIGNIFICANT CHANGE UP (ref 10.3–14.5)
SODIUM SERPL-SCNC: 135 MMOL/L — SIGNIFICANT CHANGE UP (ref 135–145)
SPECIMEN SOURCE: SIGNIFICANT CHANGE UP
WBC # BLD: 4.35 K/UL — SIGNIFICANT CHANGE UP (ref 3.8–10.5)
WBC # FLD AUTO: 4.35 K/UL — SIGNIFICANT CHANGE UP (ref 3.8–10.5)

## 2022-04-02 RX ADMIN — Medication 1.5 MILLIGRAM(S): at 05:43

## 2022-04-02 RX ADMIN — Medication 105 MILLIGRAM(S): at 05:43

## 2022-04-02 RX ADMIN — Medication 1 MILLIGRAM(S): at 11:28

## 2022-04-02 RX ADMIN — AMLODIPINE BESYLATE 5 MILLIGRAM(S): 2.5 TABLET ORAL at 05:43

## 2022-04-02 RX ADMIN — Medication 650 MILLIGRAM(S): at 11:28

## 2022-04-02 RX ADMIN — Medication 650 MILLIGRAM(S): at 11:58

## 2022-04-02 RX ADMIN — Medication 105 MILLIGRAM(S): at 15:29

## 2022-04-02 RX ADMIN — Medication 200 MILLIGRAM(S): at 02:57

## 2022-04-02 RX ADMIN — SODIUM CHLORIDE 60 MILLILITER(S): 9 INJECTION INTRAMUSCULAR; INTRAVENOUS; SUBCUTANEOUS at 09:31

## 2022-04-02 RX ADMIN — Medication 2 MILLIGRAM(S): at 02:29

## 2022-04-02 RX ADMIN — Medication 1.5 MILLIGRAM(S): at 09:28

## 2022-04-02 RX ADMIN — Medication 2 MILLIGRAM(S): at 00:09

## 2022-04-02 RX ADMIN — Medication 1.5 MILLIGRAM(S): at 21:26

## 2022-04-02 RX ADMIN — Medication 1 TABLET(S): at 11:28

## 2022-04-02 RX ADMIN — Medication 1.5 MILLIGRAM(S): at 14:47

## 2022-04-02 RX ADMIN — Medication 1.5 MILLIGRAM(S): at 17:57

## 2022-04-02 NOTE — PROVIDER CONTACT NOTE (CRITICAL VALUE NOTIFICATION) - ASSESSMENT
vitals checked q1hr and maura checked q1 hr
Alert and responsive with confusion. Skin color good warm and dry to touch. Respirations regular and unlabored. Denies pain or discomfort.

## 2022-04-02 NOTE — PROGRESS NOTE ADULT - SUBJECTIVE AND OBJECTIVE BOX
Patient is a 72y old  Male who presents with a chief complaint of Found at the bottom of the stairs at home by spouse with patient intoxicated, with spouse also intoxicated per EMS (2022 09:35)      SUBJECTIVE / OVERNIGHT EVENTS:    Patient seen and examined. co restlessness, anxious, tremulous. per aid, climbing out of bed. denies complaints. more confused today.   pt thinks he is home not in hospital  t max 100.6 yesterday. pt denies cough sputum dysuria abd pain nvd.    Vital Signs Last 24 Hrs  T(C): 36.8 (2022 08:01), Max: 38.2 (2022 16:00)  T(F): 98.3 (2022 08:01), Max: 100.8 (2022 16:00)  HR: 101 (2022 08:01) (88 - 101)  BP: 141/81 (2022 08:01) (141/81 - 182/85)  BP(mean): --  RR: 18 (2022 08:) (18 - 20)  SpO2: 95% (2022 08:01) (93% - 97%)  I&O's Summary    2022 07:01  -  2022 07:00  --------------------------------------------------------  IN: 950 mL / OUT: 900 mL / NET: 50 mL    2022 07:01  -  2022 10:37  --------------------------------------------------------  IN: 0 mL / OUT: 200 mL / NET: -200 mL        PE:  GENERAL: NAD, AAOx2, self and date  HEAD:  Atraumatic, Normocephalic  CHEST/LUNG: CTABL, No wheeze  HEART: Regular rate and rhythm; no murmur  ABDOMEN: Soft, Nontender, Nondistended; Bowel sounds present  EXTREMITIES:  2+ Peripheral Pulses, No clubbing, cyanosis, or edema  SKIN: flushed  NEURO: No focal deficits. confused    LABS:                        15.8   4.35  )-----------( 96       ( 2022 08:12 )             44.8     04-02    135  |  98  |  8   ----------------------------<  113<H>  3.6   |  21<L>  |  0.83    Ca    8.4      2022 08:12  Phos  2.3     04-  Mg     2.0     04-02    TPro  6.6  /  Alb  3.9  /  TBili  1.3<H>  /  DBili  x   /  AST  81<H>  /  ALT  84<H>  /  AlkPhos  57  04-02    PT/INR - ( 31 Mar 2022 17:23 )   PT: 10.8 sec;   INR: 0.93 ratio         PTT - ( 31 Mar 2022 17:23 )  PTT:29.4 sec  CAPILLARY BLOOD GLUCOSE      POCT Blood Glucose.: 148 mg/dL (2022 08:41)  POCT Blood Glucose.: 137 mg/dL (2022 18:12)    CARDIAC MARKERS ( 31 Mar 2022 17:23 )  x     / x     / 108 U/L / x     / x          Urinalysis Basic - ( 2022 19:11 )    Color: Light Yellow / Appearance: Clear / S.014 / pH: x  Gluc: x / Ketone: Negative  / Bili: Negative / Urobili: Negative   Blood: x / Protein: 30 mg/dL / Nitrite: Negative   Leuk Esterase: Negative / RBC: 2 /hpf / WBC 0 /HPF   Sq Epi: x / Non Sq Epi: 0 /hpf / Bacteria: Negative        RADIOLOGY & ADDITIONAL TESTS:    Imaging Personally Reviewed:  [x] YES  [ ] NO    Consultant(s) Notes Reviewed:  [x] YES  [ ] NO    MEDICATIONS  (STANDING):  amLODIPine   Tablet 5 milliGRAM(s) Oral daily  dextrose 5%. 1000 milliLiter(s) (100 mL/Hr) IV Continuous <Continuous>  dextrose 5%. 1000 milliLiter(s) (50 mL/Hr) IV Continuous <Continuous>  dextrose 50% Injectable 25 Gram(s) IV Push once  dextrose 50% Injectable 12.5 Gram(s) IV Push once  dextrose 50% Injectable 25 Gram(s) IV Push once  folic acid 1 milliGRAM(s) Oral daily  glucagon  Injectable 1 milliGRAM(s) IntraMuscular once  LORazepam     Tablet   Oral   LORazepam     Tablet 1.5 milliGRAM(s) Oral every 4 hours  multivitamin 1 Tablet(s) Oral daily  sodium chloride 0.9%. 1000 milliLiter(s) (60 mL/Hr) IV Continuous <Continuous>  thiamine IVPB 250 milliGRAM(s) IV Intermittent daily  thiamine IVPB 500 milliGRAM(s) IV Intermittent every 8 hours    MEDICATIONS  (PRN):  acetaminophen     Tablet .. 650 milliGRAM(s) Oral every 6 hours PRN Temp greater or equal to 38C (100.4F), Moderate Pain (4 - 6)  dextrose Oral Gel 15 Gram(s) Oral once PRN Blood Glucose LESS THAN 70 milliGRAM(s)/deciliter  LORazepam     Tablet 2 milliGRAM(s) Oral every 2 hours PRN Symptom-triggered: 2 point increase in CIWA -Ar score and a total score of 7 or LESS  LORazepam   Injectable 2 milliGRAM(s) IV Push every 1 hour PRN Symptom-triggered: each CIWA -Ar score 8 or GREATER      Care Discussed with Consultants/Other Providers [x] YES  [ ] NO    HEALTH ISSUES - PROBLEM Dx:  Alcohol withdrawal    Elevated lactic acid level    History of deep venous thrombosis    Discharge planning issues

## 2022-04-02 NOTE — CONSULT NOTE ADULT - SUBJECTIVE AND OBJECTIVE BOX
[facility], Division of Infectious Diseases  PIOTR Fields S. Shah, Y. Patel, G. 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 History from patient was not reliable, partial history was obtained from spouse above by phone on admission. Patient admits to liquor cocktails but refused to quantify amount for examiner.  Spouse also admitted to examiner that she also drinks ethanol with spouse, due to family recently with a cancer diagnosis.  Spouse denies domestic violence issues.   Patient with abrasion LEFT forearm.  Patient offers no complaint, but conversation desultory.  Patient awake, hypervigilant at times, but denies HA, focal weakness.  Denies arm pain, chest pain, abdominal pain, pelvic or leg pain.  Spouse reports that patient is concerned about his chronic DVT but it is not clear if patient is compliant with his direct anticoagulant. (2022 02:14)  Patient seen and examined at bedside this morning. Patient awake, oriented to self, able to tell in hospital but not name, knows year, says "kandi" is the president. Patient states he feels fine, denies fever, chills, chest pain, cough, dyspnea, abdominal pain, nausea, vomiting, diarrhea, dysuria. 1:1 at bedside.   ROS: 14 point review of systems completed, pertinent positives and negatives as per HPI.    Allergies: No Known Allergies    PMH -- Alcohol abuse  History of alcohol use  ALC (alcoholic liver cirrhosis)  Deep venous thrombosis    PSH -- No significant past surgical history  FH -- No pertinent family history in first degree relatives  Social History -- Patient admitted to liquor mixed drinks but refused to quantify amount. Spouse admitted that she drinks herself. Denies tobacco or illicit drug use    Physical Exam--  Vital Signs Last 24 Hrs  T(F): 98.3 (2022 08:01), Max: 100.8 (2022 16:00)  HR: 101 (2022 08:01) (88 - 101)  BP: 141/81 (2022 08:01) (141/81 - 182/85)  RR: 18 (2022 08:01) (18 - 20)  SpO2: 95% (2022 08:01) (93% - 97%)  General: no acute distress  HEENT: NC/AT, EOMI, anicteric, neck supple  Lungs: Clear bilaterally without rales, wheezing or rhonchi  Heart: S1 and S2 present, normal rate   Abdomen: Soft. Nondistended. Nontender. BS present.   Neuro: AAOx2-3, no obvious focal deficits   Extremities: No cyanosis or clubbing. No edema.    Skin: Warm. Dry. No rash. L forearm skin tear  Lines: PIV    Laboratory & Imaging Data--  CBC:                       15.8   4.35  )-----------( 96       ( 2022 08:12 )             44.8     WBC Count: 4.35 K/uL (22 @ 08:12)  WBC Count: 4.41 K/uL (22 @ 06:43)  WBC Count: 6.51 K/uL (22 @ 17:23)    CMP:     135  |  98  |  8   ----------------------------<  113<H>  3.6   |  21<L>  |  0.83    Ca    8.4      2022 08:12  Phos  2.3     04-  Mg     2.0     -    TPro  6.6  /  Alb  3.9  /  TBili  1.3<H>  /  DBili  x   /  AST  81<H>  /  ALT  84<H>  /  AlkPhos  57  04-    LIVER FUNCTIONS - ( 2022 08:12 )  Alb: 3.9 g/dL / Pro: 6.6 g/dL / ALK PHOS: 57 U/L / ALT: 84 U/L / AST: 81 U/L / GGT: x           Urinalysis Basic - ( 2022 19:11 )  Color: Light Yellow / Appearance: Clear / S.014 / pH: x  Gluc: x / Ketone: Negative  / Bili: Negative / Urobili: Negative   Blood: x / Protein: 30 mg/dL / Nitrite: Negative   Leuk Esterase: Negative / RBC: 2 /hpf / WBC 0 /HPF   Sq Epi: x / Non Sq Epi: 0 /hpf / Bacteria: Negative    Microbiology:   3/31 - COVID-19 PCR - negative    Radiology--  US Abdomen Complete (US Abdomen Complete .) (22 @ 10:57) >IMPRESSION: Mild hepatomegaly and diffuse hepatic steatosis.  CT Chest Abdomen and Pelvis w/ IV Cont (22 @ 20:46) >IMPRESSION: No traumatic injury. Hepatic steatosis  CT Head and Cervical Spine No Cont (22 @ 20:07) >IMPRESSION: No acute intracranial hemorrhage. No acute fracturecervical spine. If pain persists, follow-up MRI exam recommended  Xray Chest 1 View- PORTABLE-Urgent (Xray Chest 1 View- PORTABLE-Urgent .) (22 @ 17:25) >IMPRESSION: Clear lungs. No pneumothorax.    Active Medications--  acetaminophen     Tablet .. 650 milliGRAM(s) Oral every 6 hours PRN  amLODIPine   Tablet 5 milliGRAM(s) Oral daily  dextrose 5%. 1000 milliLiter(s) IV Continuous <Continuous>  dextrose 5%. 1000 milliLiter(s) IV Continuous <Continuous>  dextrose 50% Injectable 25 Gram(s) IV Push once  dextrose 50% Injectable 12.5 Gram(s) IV Push once  dextrose 50% Injectable 25 Gram(s) IV Push once  dextrose Oral Gel 15 Gram(s) Oral once PRN  folic acid 1 milliGRAM(s) Oral daily  glucagon  Injectable 1 milliGRAM(s) IntraMuscular once  LORazepam     Tablet   Oral   LORazepam     Tablet 2 milliGRAM(s) Oral every 2 hours PRN  LORazepam     Tablet 1.5 milliGRAM(s) Oral every 4 hours  LORazepam   Injectable 2 milliGRAM(s) IV Push every 1 hour PRN  multivitamin 1 Tablet(s) Oral daily  sodium chloride 0.9%. 1000 milliLiter(s) IV Continuous <Continuous>  thiamine IVPB 250 milliGRAM(s) IV Intermittent daily  thiamine IVPB 500 milliGRAM(s) IV Intermittent every 8 hours

## 2022-04-02 NOTE — CONSULT NOTE ADULT - ASSESSMENT
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     Fever   Alcohol abuse with withdrawal  Dehydration due to alcohol abuse   Thrombocytopenia likely due to above   Transaminitis in setting of above and hepatic steatosis    - had low grade temps yesterday Tm 100.8F, +lactate, normal WBC    -- afebrile overnight, lactate normalized, WBC remains wnl   - COVID PCR negative    - CXR with no consolidation/infiltrates    - CT C/A/P with no infectious source, noted with hepatic steatosis    - UA negative, no urinary symptoms    - L forearm with skin tear - does not appear infected    - follow blood cultures in process     - continue to monitor off antibiotics    - on CIWA protocol, management per primary team    - monitor temps/WBC      Jackie Holland M.D.  [facility], Division of Infectious Diseases  [phone]  After 5pm on weekdays and all day on weekends - please call [phone]

## 2022-04-02 NOTE — PROVIDER CONTACT NOTE (CHANGE IN STATUS NOTIFICATION) - ASSESSMENT
Alert and responsive with confusion. Skin color good warm and dry to touch. Respiratioons regular and unlabored. Denies pain or discomfort. Alert and responsive with confusion. Skin color good warm and dry to touch. Respirations regular and unlabored. Denies pain or discomfort.

## 2022-04-02 NOTE — PROGRESS NOTE ADULT - ASSESSMENT
71yo M PMHx etoh abuse with past ictal episodes and delirium, with admission to [place] in August 2021 with an extensive LEFT LE DVT, 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     # ETOH abuse  # cirrhosis  etoh cessation discussed, pt interested in quitting  SW consult  states last drink 3 days ago, drinks 2-4 shots vodka daily  CIWA high risk protocol but will have to use lorazepam due to patient's age and history of cirrhosis.  Will provide Ativan taper with also symptom control.   Will provide thiamine at Wernicke protocol as above.   1:1 for safety.    # hx extensive LLE DVT sp thrombectomy and bl PE august 2021  will cont xarelto as no signs of bleeding  prior admission, pt refused hypercoagulable work up and malignancy work up including EGD and colon, given son with metastatic colon cancer  unclear if pt has followed up  will check doppler US   heme consult for thrombophilic work-up and determination of duration of anticoagulation.      [facility] with questions [phone].       71yo M PMHx etoh abuse with past ictal episodes and delirium, with admission to [place] in August 2021 with an extensive LEFT LE DVT, 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     # ETOH abuse  # cirrhosis  etoh cessation discussed, pt interested in quitting  SW consult  states last drink 3 days ago, drinks 2-4 shots vodka daily  CIWA high risk protocol but will have to use lorazepam due to patient's age and history of cirrhosis.  Will provide Ativan taper with also symptom control.   Will provide thiamine at Wernicke protocol as above.   1:1 for safety.  ABD US hepatic steatosis    # fever  fu cultures  afebrile, monitor temps  likely from withdrawal and not infectious  ID consult    # hx extensive LLE DVT sp thrombectomy and bl PE august 2021  will cont xarelto as no signs of bleeding  prior admission, pt refused hypercoagulable work up and malignancy work up including EGD and colon, given son with metastatic colon cancer  unclear if pt has followed up  will check doppler US   possible heme consult for thrombophilic work-up and determination of duration of anticoagulation as pt has not followed up    Please call [facility] with questions [phone].

## 2022-04-03 LAB
ANION GAP SERPL CALC-SCNC: 16 MMOL/L — SIGNIFICANT CHANGE UP (ref 5–17)
BILIRUB SERPL-MCNC: 1 MG/DL — SIGNIFICANT CHANGE UP (ref 0.2–1.2)
BUN SERPL-MCNC: 8 MG/DL — SIGNIFICANT CHANGE UP (ref 7–23)
CALCIUM SERPL-MCNC: 8.7 MG/DL — SIGNIFICANT CHANGE UP (ref 8.4–10.5)
CHLORIDE SERPL-SCNC: 98 MMOL/L — SIGNIFICANT CHANGE UP (ref 96–108)
CO2 SERPL-SCNC: 20 MMOL/L — LOW (ref 22–31)
CREAT SERPL-MCNC: 0.79 MG/DL — SIGNIFICANT CHANGE UP (ref 0.5–1.3)
CULTURE RESULTS: SIGNIFICANT CHANGE UP
EGFR: 94 ML/MIN/1.73M2 — SIGNIFICANT CHANGE UP
GLUCOSE SERPL-MCNC: 98 MG/DL — SIGNIFICANT CHANGE UP (ref 70–99)
HCT VFR BLD CALC: 46.8 % — SIGNIFICANT CHANGE UP (ref 39–50)
HGB BLD-MCNC: 16.7 G/DL — SIGNIFICANT CHANGE UP (ref 13–17)
INR BLD: 1.09 RATIO — SIGNIFICANT CHANGE UP (ref 0.88–1.16)
MCHC RBC-ENTMCNC: 35.5 PG — HIGH (ref 27–34)
MCHC RBC-ENTMCNC: 35.7 GM/DL — SIGNIFICANT CHANGE UP (ref 32–36)
MCV RBC AUTO: 99.6 FL — SIGNIFICANT CHANGE UP (ref 80–100)
MELD SCORE WITH DIALYSIS: 22 POINTS — SIGNIFICANT CHANGE UP
MELD SCORE WITHOUT DIALYSIS: 7 POINTS — SIGNIFICANT CHANGE UP
NRBC # BLD: 0 /100 WBCS — SIGNIFICANT CHANGE UP (ref 0–0)
ORGANISM # SPEC MICROSCOPIC CNT: SIGNIFICANT CHANGE UP
ORGANISM # SPEC MICROSCOPIC CNT: SIGNIFICANT CHANGE UP
PLATELET # BLD AUTO: 75 K/UL — LOW (ref 150–400)
POTASSIUM SERPL-MCNC: 3.5 MMOL/L — SIGNIFICANT CHANGE UP (ref 3.5–5.3)
POTASSIUM SERPL-SCNC: 3.5 MMOL/L — SIGNIFICANT CHANGE UP (ref 3.5–5.3)
PROTHROM AB SERPL-ACNC: 12.6 SEC — SIGNIFICANT CHANGE UP (ref 10.5–13.4)
RBC # BLD: 4.7 M/UL — SIGNIFICANT CHANGE UP (ref 4.2–5.8)
RBC # FLD: 13.1 % — SIGNIFICANT CHANGE UP (ref 10.3–14.5)
SODIUM SERPL-SCNC: 134 MMOL/L — LOW (ref 135–145)
SPECIMEN SOURCE: SIGNIFICANT CHANGE UP
WBC # BLD: 3.12 K/UL — LOW (ref 3.8–10.5)
WBC # FLD AUTO: 3.12 K/UL — LOW (ref 3.8–10.5)

## 2022-04-03 PROCEDURE — 99222 1ST HOSP IP/OBS MODERATE 55: CPT

## 2022-04-03 PROCEDURE — 93970 EXTREMITY STUDY: CPT | Mod: 26

## 2022-04-03 RX ORDER — SENNA PLUS 8.6 MG/1
2 TABLET ORAL AT BEDTIME
Refills: 0 | Status: DISCONTINUED | OUTPATIENT
Start: 2022-04-03 | End: 2022-04-11

## 2022-04-03 RX ORDER — POLYETHYLENE GLYCOL 3350 17 G/17G
17 POWDER, FOR SOLUTION ORAL DAILY
Refills: 0 | Status: DISCONTINUED | OUTPATIENT
Start: 2022-04-03 | End: 2022-04-11

## 2022-04-03 RX ORDER — AMLODIPINE BESYLATE 2.5 MG/1
10 TABLET ORAL DAILY
Refills: 0 | Status: DISCONTINUED | OUTPATIENT
Start: 2022-04-03 | End: 2022-04-11

## 2022-04-03 RX ORDER — IPRATROPIUM/ALBUTEROL SULFATE 18-103MCG
3 AEROSOL WITH ADAPTER (GRAM) INHALATION ONCE
Refills: 0 | Status: COMPLETED | OUTPATIENT
Start: 2022-04-03 | End: 2022-04-03

## 2022-04-03 RX ORDER — HYDRALAZINE HCL 50 MG
25 TABLET ORAL EVERY 8 HOURS
Refills: 0 | Status: DISCONTINUED | OUTPATIENT
Start: 2022-04-03 | End: 2022-04-11

## 2022-04-03 RX ORDER — RIVAROXABAN 15 MG-20MG
20 KIT ORAL
Refills: 0 | Status: DISCONTINUED | OUTPATIENT
Start: 2022-04-03 | End: 2022-04-04

## 2022-04-03 RX ADMIN — Medication 200 MILLIGRAM(S): at 10:04

## 2022-04-03 RX ADMIN — Medication 2 MILLIGRAM(S): at 15:02

## 2022-04-03 RX ADMIN — RIVAROXABAN 20 MILLIGRAM(S): KIT at 18:00

## 2022-04-03 RX ADMIN — Medication 1 MILLIGRAM(S): at 09:54

## 2022-04-03 RX ADMIN — Medication 1 MILLIGRAM(S): at 17:27

## 2022-04-03 RX ADMIN — Medication 25 MILLIGRAM(S): at 08:23

## 2022-04-03 RX ADMIN — Medication 3 MILLILITER(S): at 14:44

## 2022-04-03 RX ADMIN — Medication 1 MILLIGRAM(S): at 05:44

## 2022-04-03 RX ADMIN — Medication 1 TABLET(S): at 11:38

## 2022-04-03 RX ADMIN — SENNA PLUS 2 TABLET(S): 8.6 TABLET ORAL at 22:02

## 2022-04-03 RX ADMIN — Medication 1 MILLIGRAM(S): at 11:38

## 2022-04-03 RX ADMIN — SODIUM CHLORIDE 60 MILLILITER(S): 9 INJECTION INTRAMUSCULAR; INTRAVENOUS; SUBCUTANEOUS at 11:41

## 2022-04-03 RX ADMIN — Medication 25 MILLIGRAM(S): at 21:32

## 2022-04-03 RX ADMIN — Medication 200 MILLIGRAM(S): at 21:32

## 2022-04-03 RX ADMIN — Medication 2 MILLIGRAM(S): at 10:21

## 2022-04-03 RX ADMIN — Medication 25 MILLIGRAM(S): at 15:03

## 2022-04-03 RX ADMIN — Medication 1 MILLIGRAM(S): at 21:32

## 2022-04-03 RX ADMIN — Medication 1.5 MILLIGRAM(S): at 01:34

## 2022-04-03 RX ADMIN — Medication 1 MILLIGRAM(S): at 14:08

## 2022-04-03 RX ADMIN — AMLODIPINE BESYLATE 5 MILLIGRAM(S): 2.5 TABLET ORAL at 05:45

## 2022-04-03 RX ADMIN — Medication 102.5 MILLIGRAM(S): at 11:37

## 2022-04-03 RX ADMIN — Medication 2 MILLIGRAM(S): at 19:45

## 2022-04-03 NOTE — PROGRESS NOTE ADULT - ASSESSMENT
73yo M PMHx etoh abuse with past ictal episodes and delirium, with admission to [place] in August 2021 with an extensive LEFT LE DVT, 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     # ETOH abuse  # cirrhosis  etoh cessation discussed, pt interested in quitting  SW consult  states last drink 3 days ago, drinks 2-4 shots vodka daily  CIWA high risk protocol but will have to use lorazepam due to patient's age and history of cirrhosis.  Will provide Ativan taper with also symptom control.   Will provide thiamine at Wernicke protocol as above.   1:1 for safety.  ABD US hepatic steatosis    # fever  afebrile  BC strep epi likely contaminant  repeat BC  no abx  ID following    # hx extensive LLE DVT sp thrombectomy and bl PE august 2021  will cont xarelto as no signs of bleeding, pt may be fall risk during intoxication but higher risk for VTE if not on AC  prior admission, pt refused hypercoagulable work up and malignancy work up including EGD and colon  pt states son has "40 year old son has bone cancer, cancer of skin, and was told he had colon cancer but not any more"?  pt has not followed up with heme or vascular cardiology since discharge  pending doppler US to assess DVT  heme consult for thrombophilic work-up and determination of duration of anticoagulation as pt has not followed up     dvt ppx xarelto    Please call [facility] with questions [phone]

## 2022-04-03 NOTE — CONSULT NOTE ADULT - SUBJECTIVE AND OBJECTIVE BOX
HPI:  This patient is a 71yo gentleman with PMH of alcohol use disorder with history of hospitalizations for seizures and delirium, extensive thrombosis who presents to the ED after being found at the bottom of the stairs in setting of alcohol use.  Hematology team was consulted to evaluate the patient due to his history of extensive thrombosis.   Per [place] record, the patient was last hospitalized from -21. CT C/A/P found bilateral PE, infrarenal IVC thrombus extending to L common iliac and L external iliac and L common femoral vein.   He was treated with heparin gtt, and underwent IR iliocaval venous thrombosis mechanical thrombectomy. Post-thrombectomy venogram found patent IVC and iliofemoral venous system.   US Abdomen identified early cirrhosis. CT A/P found bilobar hypodense hepatic lesions. MRI abdomen found liver of normal morphology with mild diffuse steatosis, and lesions likely hemangiomas. AFP was <1.8. CA 19-9 was 9. CEA was 0.8, all WNL. The patient was discharged on xarelto 15mg BID x 21 days, with transition to 20mg qdaily.   He was advised to have follow up with hematology for evaluation of thrombosis and gastroenterology for colonoscopy and age appropriate cancer screening.   The patient did not follow up outpatient, thus hematology team consulted for hypercoagulable workup.       PAST MEDICAL & SURGICAL HISTORY:  History of alcohol use    ALC (alcoholic liver cirrhosis)    Deep venous thrombosis    Deep venous thrombosis  S/P IR thrombectomy      FAMILY HISTORY:  No pertinent family history in first degree relatives      Social History:  No tobacco.   Patient admits to liquor mixed drinks but refuses to quantify amount for examiner.   Spouse admits to examiner that she drinks herself.   Spouse denies domestic violence issues. (2022 02:14)      REVIEW OF SYSTEMS  CONSTITUTIONAL: No fever, no chills, no fatigue  EYES: No eye pain, no vision changes  ENMT:  No difficulty hearing, no throat pain  RESPIRATORY: No cough,  No shortness of breath  CARDIOVASCULAR: No chest pain, no palpitations  GASTROINTESTINAL: No abdominal pain, no nausea, no vomiting, no diarrhea, no constipation,  GENITOURINARY: No dysuria, no hematuria  NEUROLOGICAL: No numbness , no loss of strength  SKIN: No itching, no rashes,  HEME/LYMPH: No easy bruising, bleeding      >>> <<<>>> <<<  >>> <<<  Allergies  Allergies    No Known Allergies    Intolerances        Medications  MEDICATIONS  (STANDING):  amLODIPine   Tablet 10 milliGRAM(s) Oral daily  bisacodyl Suppository 10 milliGRAM(s) Rectal once  dextrose 5%. 1000 milliLiter(s) (100 mL/Hr) IV Continuous <Continuous>  dextrose 5%. 1000 milliLiter(s) (50 mL/Hr) IV Continuous <Continuous>  dextrose 50% Injectable 25 Gram(s) IV Push once  dextrose 50% Injectable 12.5 Gram(s) IV Push once  dextrose 50% Injectable 25 Gram(s) IV Push once  folic acid 1 milliGRAM(s) Oral daily  glucagon  Injectable 1 milliGRAM(s) IntraMuscular once  hydrALAZINE 25 milliGRAM(s) Oral every 8 hours  LORazepam     Tablet   Oral   LORazepam     Tablet 1 milliGRAM(s) Oral every 4 hours  multivitamin 1 Tablet(s) Oral daily  rivaroxaban 20 milliGRAM(s) Oral with dinner  senna 2 Tablet(s) Oral at bedtime  sodium chloride 0.9%. 1000 milliLiter(s) (60 mL/Hr) IV Continuous <Continuous>  thiamine IVPB 250 milliGRAM(s) IV Intermittent daily    MEDICATIONS  (PRN):  acetaminophen     Tablet .. 650 milliGRAM(s) Oral every 6 hours PRN Temp greater or equal to 38C (100.4F), Moderate Pain (4 - 6)  dextrose Oral Gel 15 Gram(s) Oral once PRN Blood Glucose LESS THAN 70 milliGRAM(s)/deciliter  LORazepam     Tablet 2 milliGRAM(s) Oral every 2 hours PRN Symptom-triggered: 2 point increase in CIWA -Ar score and a total score of 7 or LESS  LORazepam   Injectable 2 milliGRAM(s) IV Push every 1 hour PRN Symptom-triggered: each CIWA -Ar score 8 or GREATER  polyethylene glycol 3350 17 Gram(s) Oral daily PRN Constipation      PHYSICAL EXAM:  GENERAL: NAD, well-groomed  HEAD:  Atraumatic, Normocephalic  EYES: EOMI, PERRLA, conjunctiva and sclera clear  ENMT: No oropharyngeal exudates, Moist mucous membranes  NECK: Supple, no cervical lymphadenopathy  NERVOUS SYSTEM:  alert and conversant, moving all extremities spontaneously   CHEST/LUNG: Clear to auscultation bilaterally; no rhonchi  HEART: Regular rate and rhythm; No murmurs  ABDOMEN: Soft, Nontender, Nondistended  EXTREMITIES:  2+ radial Pulses, No cyanosis or edema  SKIN: warm, dry    LABS:                        16.7   3.12  )-----------( 75       ( 2022 07:37 )             46.8     04-03    134<L>  |  98  |  8   ----------------------------<  98  3.5   |  20<L>  |  0.79    Ca    8.7      2022 07:34  Phos  2.3     04-02  Mg     2.0     04-02    TPro  x   /  Alb  x   /  TBili  1.0  /  DBili  x   /  AST  x   /  ALT  x   /  AlkPhos  x   04-03    PT/INR - ( 2022 07:34 )   PT: 12.6 sec;   INR: 1.09 ratio           Urinalysis Basic - ( 2022 19:11 )    Color: Light Yellow / Appearance: Clear / S.014 / pH: x  Gluc: x / Ketone: Negative  / Bili: Negative / Urobili: Negative   Blood: x / Protein: 30 mg/dL / Nitrite: Negative   Leuk Esterase: Negative / RBC: 2 /hpf / WBC 0 /HPF   Sq Epi: x / Non Sq Epi: 0 /hpf / Bacteria: Negative        RADIOLOGY & ADDITIONAL STUDIES:  PATHOLOGY:     HPI:  This patient is a 73yo gentleman with PMH of alcohol use disorder with history of hospitalizations for seizures and delirium, extensive thrombosis who presents to the ED after being found at the bottom of the stairs in setting of alcohol use.  Hematology team was consulted to evaluate the patient due to his history of extensive thrombosis.   Per [place] record, the patient was last hospitalized from -21. CT C/A/P found bilateral PE, infrarenal IVC thrombus extending to L common iliac and L external iliac and L common femoral vein.   He was treated with heparin gtt, and underwent IR iliocaval venous thrombosis mechanical thrombectomy. Post-thrombectomy venogram found patent IVC and iliofemoral venous system.   US Abdomen identified early cirrhosis. CT A/P found bilobar hypodense hepatic lesions. MRI abdomen found liver of normal morphology with mild diffuse steatosis, and lesions likely hemangiomas. AFP was <1.8. CA 19-9 was 9. CEA was 0.8, all WNL. The patient was discharged on xarelto 15mg BID x 21 days, with transition to 20mg qdaily.   He was advised to have follow up with hematology for evaluation of thrombosis and gastroenterology for colonoscopy and age appropriate cancer screening.   The hematology team was consulted for hypercoagulable workup.   The patient reports he had been taking xarelto up to 1 week ago. He denies having outpatient follow up with a hematologist. He endorses poor memory. He reports he had a screening colonoscopy about 8 years ago which was reportedly unremarkable.  He denies any history of thrombosis aside from the thromboses he experienced during his previous hospital stay. He denies any episodes of blood per rectum or hematuria.     PAST MEDICAL & SURGICAL HISTORY:  History of alcohol use  ALC (alcoholic liver cirrhosis)  Deep venous thrombosis  Deep venous thrombosis  S/P IR thrombectomy    FAMILY HISTORY:  No pertinent family history of thrombosis     Social History:  No tobacco.   Patient admits to liquor mixed drinks but refuses to quantify amount for examiner.   Spouse admits to examiner that she drinks herself.   Spouse denies domestic violence issues. (2022 02:14)    REVIEW OF SYSTEMS  CONSTITUTIONAL: No fever, no chills, no fatigue  EYES: No eye pain, no vision changes  ENMT:  No difficulty hearing, no throat pain  RESPIRATORY: No cough,  No shortness of breath  CARDIOVASCULAR: No chest pain, no palpitations  GASTROINTESTINAL: No abdominal pain, no nausea, no vomiting, no diarrhea, no constipation,  GENITOURINARY: No dysuria, no hematuria  NEUROLOGICAL: No numbness , no loss of strength, + memory loss  SKIN: No itching, no rashes,  HEME/LYMPH: +LUE easy bruising, no bleeding    >>> <<<>>> <<<  >>> <<<  Allergies  Allergies    No Known Allergies    Intolerances        Medications  MEDICATIONS  (STANDING):  amLODIPine   Tablet 10 milliGRAM(s) Oral daily  bisacodyl Suppository 10 milliGRAM(s) Rectal once  dextrose 5%. 1000 milliLiter(s) (100 mL/Hr) IV Continuous <Continuous>  dextrose 5%. 1000 milliLiter(s) (50 mL/Hr) IV Continuous <Continuous>  dextrose 50% Injectable 25 Gram(s) IV Push once  dextrose 50% Injectable 12.5 Gram(s) IV Push once  dextrose 50% Injectable 25 Gram(s) IV Push once  folic acid 1 milliGRAM(s) Oral daily  glucagon  Injectable 1 milliGRAM(s) IntraMuscular once  hydrALAZINE 25 milliGRAM(s) Oral every 8 hours  LORazepam     Tablet   Oral   LORazepam     Tablet 1 milliGRAM(s) Oral every 4 hours  multivitamin 1 Tablet(s) Oral daily  rivaroxaban 20 milliGRAM(s) Oral with dinner  senna 2 Tablet(s) Oral at bedtime  sodium chloride 0.9%. 1000 milliLiter(s) (60 mL/Hr) IV Continuous <Continuous>  thiamine IVPB 250 milliGRAM(s) IV Intermittent daily    MEDICATIONS  (PRN):  acetaminophen     Tablet .. 650 milliGRAM(s) Oral every 6 hours PRN Temp greater or equal to 38C (100.4F), Moderate Pain (4 - 6)  dextrose Oral Gel 15 Gram(s) Oral once PRN Blood Glucose LESS THAN 70 milliGRAM(s)/deciliter  LORazepam     Tablet 2 milliGRAM(s) Oral every 2 hours PRN Symptom-triggered: 2 point increase in CIWA -Ar score and a total score of 7 or LESS  LORazepam   Injectable 2 milliGRAM(s) IV Push every 1 hour PRN Symptom-triggered: each CIWA -Ar score 8 or GREATER  polyethylene glycol 3350 17 Gram(s) Oral daily PRN Constipation      PHYSICAL EXAM:  GENERAL: NAD, well-groomed  HEAD:  Atraumatic, Normocephalic  EYES: EOMI, PERRLA, conjunctiva and sclera clear  ENMT: No oropharyngeal exudates, Moist mucous membranes  NECK: Supple, no cervical lymphadenopathy  NERVOUS SYSTEM:  alert and conversant, moving all extremities spontaneously   CHEST/LUNG: Clear to auscultation bilaterally; no rhonchi  HEART: Regular rate and rhythm; No murmurs  ABDOMEN: Soft, Nontender, Nondistended  EXTREMITIES:  2+ radial Pulses, No cyanosis or edema  SKIN: warm, dry    LABS:                        16.7   3.12  )-----------( 75       ( 2022 07:37 )             46.8     04-03    134<L>  |  98  |  8   ----------------------------<  98  3.5   |  20<L>  |  0.79    Ca    8.7      2022 07:34  Phos  2.3     04-02  Mg     2.0     04-02    TPro  x   /  Alb  x   /  TBili  1.0  /  DBili  x   /  AST  x   /  ALT  x   /  AlkPhos  x   04-03    PT/INR - ( 2022 07:34 )   PT: 12.6 sec;   INR: 1.09 ratio           Urinalysis Basic - ( 2022 19:11 )    Color: Light Yellow / Appearance: Clear / S.014 / pH: x  Gluc: x / Ketone: Negative  / Bili: Negative / Urobili: Negative   Blood: x / Protein: 30 mg/dL / Nitrite: Negative   Leuk Esterase: Negative / RBC: 2 /hpf / WBC 0 /HPF   Sq Epi: x / Non Sq Epi: 0 /hpf / Bacteria: Negative        RADIOLOGY & ADDITIONAL STUDIES:  PATHOLOGY:

## 2022-04-03 NOTE — PROGRESS NOTE ADULT - SUBJECTIVE AND OBJECTIVE BOX
Patient is a 72y old  Male who presents with a chief complaint of Found at the bottom of the stairs at home by spouse with patient intoxicated, with spouse also intoxicated per EMS (2022 08:33)      SUBJECTIVE / OVERNIGHT EVENTS:    Patient seen and examined. co anxious and jittery but feels better than yesterday. pt states he has been taking xarelto daily. pt has not followed up with doctors since prior admission with dx PE and VTE.       Vital Signs Last 24 Hrs  T(C): 37.7 (2022 08:20), Max: 38.5 (2022 11:00)  T(F): 99.8 (2022 08:20), Max: 101.3 (2022 11:00)  HR: 89 (2022 08:20) (89 - 102)  BP: 151/81 (2022 08:20) (151/81 - 187/99)  BP(mean): --  RR: 18 (2022 08:20) (18 - 18)  SpO2: 93% (2022 08:20) (93% - 99%)  I&O's Summary    2022 07:01  -  2022 07:00  --------------------------------------------------------  IN:  mL / OUT: 2240 mL / NET: -230 mL        PE:  GENERAL: NAD, AAOx2  HEAD:  Atraumatic, Normocephalic  CHEST/LUNG: CTABL, No wheeze  HEART: Regular rate and rhythm; no murmur  ABDOMEN: Soft, Nontender, Nondistended; Bowel sounds present  EXTREMITIES:  2+ Peripheral Pulses, No clubbing, cyanosis, or edema  NEURO: No focal deficits. tremors  psych: jittery anxious    LABS:                        16.7   3.12  )-----------( 75       ( 2022 07:37 )             46.8     04-03    134<L>  |  98  |  8   ----------------------------<  98  3.5   |  20<L>  |  0.79    Ca    8.7      2022 07:34  Phos  2.3     04-02  Mg     2.0     04-02    TPro  x   /  Alb  x   /  TBili  1.0  /  DBili  x   /  AST  x   /  ALT  x   /  AlkPhos  x   04-03    PT/INR - ( 2022 07:34 )   PT: 12.6 sec;   INR: 1.09 ratio           CAPILLARY BLOOD GLUCOSE      POCT Blood Glucose.: 123 mg/dL (2022 22:01)  POCT Blood Glucose.: 114 mg/dL (2022 17:52)  POCT Blood Glucose.: 127 mg/dL (2022 12:15)        Urinalysis Basic - ( 2022 19:11 )    Color: Light Yellow / Appearance: Clear / S.014 / pH: x  Gluc: x / Ketone: Negative  / Bili: Negative / Urobili: Negative   Blood: x / Protein: 30 mg/dL / Nitrite: Negative   Leuk Esterase: Negative / RBC: 2 /hpf / WBC 0 /HPF   Sq Epi: x / Non Sq Epi: 0 /hpf / Bacteria: Negative        RADIOLOGY & ADDITIONAL TESTS:    Imaging Personally Reviewed:  [x] YES  [ ] NO    Consultant(s) Notes Reviewed:  [x] YES  [ ] NO    MEDICATIONS  (STANDING):  amLODIPine   Tablet 10 milliGRAM(s) Oral daily  dextrose 5%. 1000 milliLiter(s) (50 mL/Hr) IV Continuous <Continuous>  dextrose 5%. 1000 milliLiter(s) (100 mL/Hr) IV Continuous <Continuous>  dextrose 50% Injectable 25 Gram(s) IV Push once  dextrose 50% Injectable 12.5 Gram(s) IV Push once  dextrose 50% Injectable 25 Gram(s) IV Push once  folic acid 1 milliGRAM(s) Oral daily  glucagon  Injectable 1 milliGRAM(s) IntraMuscular once  hydrALAZINE 25 milliGRAM(s) Oral every 8 hours  LORazepam     Tablet   Oral   LORazepam     Tablet 1 milliGRAM(s) Oral every 4 hours  multivitamin 1 Tablet(s) Oral daily  rivaroxaban 20 milliGRAM(s) Oral with dinner  sodium chloride 0.9%. 1000 milliLiter(s) (60 mL/Hr) IV Continuous <Continuous>  thiamine IVPB 250 milliGRAM(s) IV Intermittent daily    MEDICATIONS  (PRN):  acetaminophen     Tablet .. 650 milliGRAM(s) Oral every 6 hours PRN Temp greater or equal to 38C (100.4F), Moderate Pain (4 - 6)  dextrose Oral Gel 15 Gram(s) Oral once PRN Blood Glucose LESS THAN 70 milliGRAM(s)/deciliter  LORazepam     Tablet 2 milliGRAM(s) Oral every 2 hours PRN Symptom-triggered: 2 point increase in CIWA -Ar score and a total score of 7 or LESS  LORazepam   Injectable 2 milliGRAM(s) IV Push every 1 hour PRN Symptom-triggered: each CIWA -Ar score 8 or GREATER      Care Discussed with Consultants/Other Providers [x] YES  [ ] NO    HEALTH ISSUES - PROBLEM Dx:  Alcohol withdrawal    Elevated lactic acid level    History of deep venous thrombosis    Discharge planning issues

## 2022-04-03 NOTE — CONSULT NOTE ADULT - ASSESSMENT
This patient is a 71yo gentleman with PMH of alcohol use disorder with history of hospitalizations for seizures and delirium, extensive thrombosis who presents to the ED after being found at the bottom of the stairs in setting of alcohol use.  Hematology team was consulted to evaluate the patient due to his history of extensive thrombosis.     Hx of thrombosis:    Thrombocytopenia:  Will review peripheral smear  Thrombocytopenia can be related to marrow suppression from alcohol consumption. His home medications are not likely contributing to his thrombocytopenia.  Renal function is at baseline      This patient is a 71yo gentleman with PMH of alcohol use disorder with history of hospitalizations for seizures and delirium, extensive thrombosis who presents to the ED after being found at the bottom of the stairs in setting of alcohol use. Hematology team was consulted to evaluate the patient due to his history of extensive thrombosis.     Hx of thrombosis:  Given the great extent of this patient's previous thrombotic event, we would generally recommend that this patient be continued on full dose anticoagulation. However, in light of the fact that this patient has known alcohol use, and recent fall, benefit of anticoagulation must be weighed against the risk of major bleed.   For hypercoagulable evaluation, will send of lupus anticoagulant, B2 microglobulin, anticardiolipin antibody, protein C, protein S, prothrombin mutation, factor V leiden.  This patient reports to us today that he fell while roller skating. We are concerned about confabulation and would like confirmation from his PMD if he had completed age appropriate cancer screening (PSA, colonoscopy). The patient's CT C/A/P did not identify suspicious lesions.    Thrombocytopenia:  Peripheral smear identified spherocytic RBCs, decreased neutrophils and monocytes and decreased platelet count (only 2-6 per HPF) with Spurr cells.   Thrombocytopenia can be related to marrow suppression from alcohol consumption. Hepatic sequestration can also be contributing to this patient's thrombocytopenia. His home medications are not likely contributing to his thrombocytopenia.  Renal function appears to be at baseline.  - Will check B12 and folate levels.     Please do not hesitate to page with questions.     Lissett Ca MD PGY4   [phone]  Hematology-Oncology Fellow       This patient is a 71yo gentleman with PMH of alcohol use disorder with history of hospitalizations for seizures and delirium, extensive thrombosis who presents to the ED after being found at the bottom of the stairs in setting of alcohol use. Hematology team was consulted to evaluate the patient due to his history of extensive thrombosis.     Hx of thrombosis:  Given the great extent of this patient's previous thrombotic event, we would generally recommend that this patient be continued on full dose anticoagulation. However, in light of the fact that this patient has known alcohol use, and recent fall, benefit of anticoagulation must be weighed against the risk of major bleed.   For hypercoagulable evaluation, please send of lupus anticoagulant, B2 glycoprotein, anticardiolipin antibody, protein C, protein S, prothrombin mutation, factor V leiden.  This patient reports to us today that he fell while roller skating. We are concerned about confabulation and would like confirmation from his PMD if he had completed age appropriate cancer screening (PSA, colonoscopy). The patient's CT C/A/P did not identify suspicious lesions.    Thrombocytopenia:  Peripheral smear identified spherocytic RBCs, decreased neutrophils and monocytes and decreased platelet count (only 2-6 per HPF) with Spurr cells.   Thrombocytopenia can be related to marrow suppression from alcohol consumption. Hepatic sequestration can also be contributing to this patient's thrombocytopenia. His home medications are not likely contributing to his thrombocytopenia.  Renal function appears to be at baseline.  - Will check B12 and folate levels.     Please do not hesitate to page with questions.     Lissett Ca MD PGY4   [phone]  Hematology-Oncology Fellow

## 2022-04-03 NOTE — CONSULT NOTE ADULT - ATTENDING COMMENTS
Asked to see pt with history of ETOH with extensive thrombosis for hypercoagulable work up. Pt denies FH of blood clot or cancer. He is not close to his sister and does not know about her history. He has never had prior blood clot other than admission in 9/2021. He thinks the blood clot was minor and has been taking the Xarelto 20mg daily consistently. He denies any lightheadedness or frequent falls and attributes his present fall that brought him to the hospital from roller skating and needing to grow up. Since this admission, he has stopped drinking but feels he may be in danger of drinking upon return home. He denies any blood in stool or night sweats or weight loss or abdominal pain or cough. On exam, lungs clear, + BS, distended NT, extremities large area of ecchymoses over the LUE and bruising over BLE, no edema over the BLE, no rash. We reviewed his CT of the chest, abd/ pelvis which does not show any evidence of malignancy. We reviewed his peripheral smear which showed decreased WBC, neutrophil predominance, wisam cells, decreased plts, few large plts. Given his history of extensive thrombosis, we can perform the hypercoagulable work up as outlined above. Even if the work up is negative, extensive thrombosis would warrant long term anticoagulation in a pt that had no contraindication to anticoagulation and was not a fall risk. It would have to be a risk / benefit conversation and outpatient follow up to continue to determine if pt can be trusted to remain on blood thinner without falls given ETOH history.

## 2022-04-03 NOTE — DIETITIAN INITIAL EVALUATION ADULT. - ORAL INTAKE PTA/DIET HISTORY
Pt interviewed at bedside though noted to be confused therefore question accuracy of information obtained. Pt implies prior hospitalizations with fair PO intake, noted pt was admitted at [facility] August 2021, prior hospitalization at [facility], and was discharged 9/1/21. States was not following any diet PTA, but limits sugar in his diet. Confirms NKFA. Per H&P, was taking a multivitamin and folic acid PTA.

## 2022-04-03 NOTE — DIETITIAN INITIAL EVALUATION ADULT. - ADD RECOMMEND
1) Recommend Regular diet in setting of A1c and BG WNL. However, if pt wishes to remain on Consistent Carbohydrate diet, may do so.         2) Recommend continue multivitamin, folic acid, and thiamine, pending no medical contraindications.         3) Monitor PO intake, GI tolerance, skin integrity, labs, weight, and bowel movement regularity.          4) Honor food preferences as feasible. Assist with meals PRN and encourage PO intake.        5) RD remains available upon request and will follow-up per protocol.       6) malnutrition alert placed in chart

## 2022-04-03 NOTE — DIETITIAN INITIAL EVALUATION ADULT. - PERTINENT MEDS FT
MEDICATIONS  (STANDING):  amLODIPine   Tablet 10 milliGRAM(s) Oral daily  dextrose 5%. 1000 milliLiter(s) (100 mL/Hr) IV Continuous <Continuous>  dextrose 5%. 1000 milliLiter(s) (50 mL/Hr) IV Continuous <Continuous>  dextrose 50% Injectable 25 Gram(s) IV Push once  dextrose 50% Injectable 12.5 Gram(s) IV Push once  dextrose 50% Injectable 25 Gram(s) IV Push once  folic acid 1 milliGRAM(s) Oral daily  glucagon  Injectable 1 milliGRAM(s) IntraMuscular once  hydrALAZINE 25 milliGRAM(s) Oral every 8 hours  LORazepam     Tablet   Oral   LORazepam     Tablet 1 milliGRAM(s) Oral every 4 hours  multivitamin 1 Tablet(s) Oral daily  rivaroxaban 20 milliGRAM(s) Oral with dinner  sodium chloride 0.9%. 1000 milliLiter(s) (60 mL/Hr) IV Continuous <Continuous>  thiamine IVPB 250 milliGRAM(s) IV Intermittent daily    MEDICATIONS  (PRN):  acetaminophen     Tablet .. 650 milliGRAM(s) Oral every 6 hours PRN Temp greater or equal to 38C (100.4F), Moderate Pain (4 - 6)  dextrose Oral Gel 15 Gram(s) Oral once PRN Blood Glucose LESS THAN 70 milliGRAM(s)/deciliter  LORazepam     Tablet 2 milliGRAM(s) Oral every 2 hours PRN Symptom-triggered: 2 point increase in CIWA -Ar score and a total score of 7 or LESS  LORazepam   Injectable 2 milliGRAM(s) IV Push every 1 hour PRN Symptom-triggered: each CIWA -Ar score 8 or GREATER

## 2022-04-03 NOTE — PROGRESS NOTE ADULT - SUBJECTIVE AND OBJECTIVE BOX
[facility], Division of Infectious Diseases  [address] Y. Patel, S. Shah, G. Casimir  [phone]  ([phone] - weekdays after 5pm and weekends)    Name: OVIDIO TOBIAS  Age/Gender: 72y Male  MRN: [id number]    Interval History:  Patient seen and examined this morning.   Denies fever, dyspnea, any pain, n/v/d.   Notes reviewed. No concerning overnight events.    Allergies: No Known Allergies    Objective:  Vitals:   T(F): 99.8 (22 @ 08:20), Max: 101.3 (22 @ 11:00)  HR: 89 (22 @ 08:20) (89 - 102)  BP: 151/81 (22 @ 08:20) (151/81 - 187/99)  RR: 18 (22 @ 08:20) (18 - 18)  SpO2: 93% (22 @ 08:20) (93% - 99%)  Physical Examination:  General: no acute distress  HEENT: NC/AT, facial flushing, anicteric, neck supple  Cardio: S1, S2 present, normal rate, no murmur  Resp: clear to auscultation bilaterally  Abd: soft, NT, ND, + BS  Neuro: awake, alert, no obvious focal deficits  Ext: no edema or cyanosis, moving extremities  Skin: warm, dry, no visible rash  Lines: PIV    Laboratory Studies:  CBC:                       16.7   3.12  )-----------( 75       ( 2022 07:37 )             46.8     WBC Trend:  3.12 22 @ 07:37  4.35 22 @ 08:12  4.41 22 @ 06:43  6.51 22 @ 17:23    CMP: 04-03    134<L>  |  98  |  8   ----------------------------<  98  3.5   |  20<L>  |  0.79    Ca    8.7      2022 07:34  Phos  2.3     04-02  Mg     2.0     04-02    TPro  x   /  Alb  x   /  TBili  1.0  /  DBili  x   /  AST  x   /  ALT  x   /  AlkPhos  x       Creatinine, Serum: 0.79 mg/dL (22 @ 07:34)  Creatinine, Serum: 0.83 mg/dL (22 @ 08:12)  Creatinine, Serum: 0.81 mg/dL (22 @ 06:43)  Creatinine, Serum: 0.75 mg/dL (22 @ 20:05)  Creatinine, Serum: 0.82 mg/dL (22 @ 17:23)    LIVER FUNCTIONS - ( 2022 08:12 )  Alb: 3.9 g/dL / Pro: 6.6 g/dL / ALK PHOS: 57 U/L / ALT: 84 U/L / AST: 81 U/L / GGT: x             Urinalysis Basic - ( 2022 19:11 )  Color: Light Yellow / Appearance: Clear / S.014 / pH: x  Gluc: x / Ketone: Negative  / Bili: Negative / Urobili: Negative   Blood: x / Protein: 30 mg/dL / Nitrite: Negative   Leuk Esterase: Negative / RBC: 2 /hpf / WBC 0 /HPF   Sq Epi: x / Non Sq Epi: 0 /hpf / Bacteria: Negative    Microbiology: reviewed   Culture - Urine (collected 22 @ 22:04)  Source: Clean Catch Clean Catch (Midstream)  Final Report (22 @ 19:11):    <10,000 CFU/mL Normal Urogenital Diane    Culture - Blood (collected 22 @ 22:04)  Source: .Blood Blood  Gram Stain (22 @ 17:23):    Growth in anaerobic bottle: Gram Positive Cocci in Clusters    Growth in aerobic bottle: Gram Positive Cocci in Clusters  Preliminary Report (22 @ 17:24):    Growth in anaerobic bottle: Gram Positive Cocci in Clusters    ***Blood Panel PCR results on this specimen are available    approximately 3 hours after the Gram stain result.***    Gram stain, PCR, and/or culture results may not always    correspond due to difference in methodologies.    ************************************************************    This PCR assay was performed by multiplex PCR. This    Assay tests for 66 bacterial and resistance gene targets.    Please refer to the [facility] Labs testdirectory    at [email or web address] for details.    Growth in aerobic bottle: Gram Positive Cocci in Clusters  Organism: Blood Culture PCR (22 @ 16:59)  Organism: Blood Culture PCR (22 @ 16:59)      -  Staphylococcus epidermidis: Detec      Method Type: PCR    Culture - Blood (collected 22 @ 22:04)  Source: .Blood Blood  Preliminary Report (22 @ 23:01):    No growth to date.    Radiology: reviewed     Medications:  acetaminophen     Tablet .. 650 milliGRAM(s) Oral every 6 hours PRN  amLODIPine   Tablet 10 milliGRAM(s) Oral daily  dextrose 5%. 1000 milliLiter(s) IV Continuous <Continuous>  dextrose 5%. 1000 milliLiter(s) IV Continuous <Continuous>  dextrose 50% Injectable 25 Gram(s) IV Push once  dextrose 50% Injectable 12.5 Gram(s) IV Push once  dextrose 50% Injectable 25 Gram(s) IV Push once  dextrose Oral Gel 15 Gram(s) Oral once PRN  folic acid 1 milliGRAM(s) Oral daily  glucagon  Injectable 1 milliGRAM(s) IntraMuscular once  hydrALAZINE 25 milliGRAM(s) Oral every 8 hours  LORazepam     Tablet   Oral   LORazepam     Tablet 2 milliGRAM(s) Oral every 2 hours PRN  LORazepam     Tablet 1 milliGRAM(s) Oral every 4 hours  LORazepam   Injectable 2 milliGRAM(s) IV Push every 1 hour PRN  multivitamin 1 Tablet(s) Oral daily  rivaroxaban 20 milliGRAM(s) Oral with dinner  sodium chloride 0.9%. 1000 milliLiter(s) IV Continuous <Continuous>  thiamine IVPB 250 milliGRAM(s) IV Intermittent daily    Antimicrobials:

## 2022-04-03 NOTE — DIETITIAN INITIAL EVALUATION ADULT. - PROBLEM SELECTOR PLAN 3
See above.  Will defer to the DAYTIME Provider review of patient's Xarelto as above.    Dopplers ordered.

## 2022-04-03 NOTE — DIETITIAN INITIAL EVALUATION ADULT. - CHIEF COMPLAINT
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 "

## 2022-04-03 NOTE — DIETITIAN INITIAL EVALUATION ADULT. - PROBLEM SELECTOR PLAN 4
Transitions of Care Status:  1.  Name of PCP:   Saud Queen DO (PCP) [phone]  2.  PCP Contacted on Admission: [ ] Y    [ x] N    3.  PCP contacted at Discharge: [ ] Y    [ ] N    [ ] N/A  4.  Post-Discharge Appointment Date and Location:  5.  Summary of Handoff given to PCP:

## 2022-04-03 NOTE — DIETITIAN INITIAL EVALUATION ADULT. - OTHER INFO
Pt reports fair PO intake in-house secondary to dislike of institutional foods.   No chewing/swallowing difficulty noted.     No nausea, vomiting, constipation, diarrhea noted. No BM yet noted per chart. Pt currently on bowel regimen ().     Per chart, pt currently ordered for thiamine, folic acid, and multivitamin in-house.    Pt unsure of UBW but states weight loss (? accuracy secondary to confusion and inability to elaborate)  Dosing wt: 175 lbs (03-31)  Wt history per chart: 180 lbs (02-06), 177 lbs (08/25/21), 170 lbs (08/23/21), 160 lbs (07/16/2020). Indicates 2.7% wt loss x 2 months - not clinically significant. RD to continue to monitor weight trends as able/available.     Some food preferences provided, will be honored by RD. Pt doesn't prefer red meat or fried foods. Pt declining oral nutrition supplements at this time. Pt made aware RD remains available. Pt reports fair PO intake in-house secondary to dislike of institutional foods.   No chewing/swallowing difficulty noted.     No nausea, vomiting, constipation, diarrhea noted. No BM yet noted per chart. Pt currently on bowel regimen (dulcolax, miralax, senna).     Per chart, pt currently ordered for thiamine, folic acid, and multivitamin in-house.    Pt unsure of UBW but states weight loss (? accuracy secondary to confusion and inability to elaborate)  Dosing wt: 175 lbs (03-31)  Wt history per chart: 180 lbs (02-06), 177 lbs (08/25/21), 170 lbs (08/23/21), 160 lbs (07/16/2020). Indicates 2.7% wt loss x 2 months - not clinically significant. RD to continue to monitor weight trends as able/available.     Some food preferences provided, will be honored by RD. Pt doesn't prefer red meat or fried foods. Pt declining oral nutrition supplements at this time. Pt made aware RD remains available.

## 2022-04-03 NOTE — DIETITIAN INITIAL EVALUATION ADULT. - REASON INDICATOR FOR ASSESSMENT
CT report called to Dr. Benedicto Alva. Dr. Benedicto Alva spoke with the patient. Patient was discharged at this time.      Donny Thompson RN  12/19/21 1 Linwood Hollis RN  12/19/21 [id number] RD consult for assessment. Source: pt, medical record.

## 2022-04-03 NOTE — DIETITIAN INITIAL EVALUATION ADULT. - PERTINENT LABORATORY DATA
04-03 Na134 mmol/L<L> Glu 98 mg/dL K+ 3.5 mmol/L Cr  0.79 mg/dL BUN 8 mg/dL     A1C with Estimated Average Glucose Result: 5.6 % (04-02-22 @ 12:19)    CAPILLARY BLOOD GLUCOSE  POCT Blood Glucose.: 123 mg/dL (02 Apr 2022 22:01)  POCT Blood Glucose.: 114 mg/dL (02 Apr 2022 17:52)  POCT Blood Glucose.: 127 mg/dL (02 Apr 2022 12:15)

## 2022-04-03 NOTE — PROGRESS NOTE ADULT - ASSESSMENT
Patient is a 72 year old male with PMH of ethanol abuse with past ictal episodes and delirium, with admission to [place] in August 2021 with an extensive LEFT LE DVT, with a prior hospitalization at [place] prior to the [place] admission, with patient with a prior infrarenal IVC thrombus into the LEFT  common iliac and external iliac vein to the common femoral vein with B/L pulmonary emboli on CTT chest imaging with IR iliocaval venous thrombosis mechanical thrombectomy, cirrhosis on US, with patient discharged 9/1/21, but now brought by EMS following spouse found patient at the bottom of a staircase on his back.     Fever  Alcohol abuse with withdrawal  Dehydration due to alcohol abuse   Thrombocytopenia likely due to above   Transaminitis in setting of above and hepatic steatosis    - intermittent fever - last fever 4/2, lactic acidosis normalized, WBC trended down    -- suspect fevers likely due to alcohol withdrawal   - COVID PCR negative    - CXR with no consolidation/infiltrates    - CT C/A/P with no infectious source, noted with hepatic steatosis    - UA negative, no urinary symptoms    - L forearm with skin tear - does not appear infected    - 1/2 blood cultures with CoNS-Staph epi      -- likely skin contaminant, would not treat at this time   - will repeat blood cultures x2   - continue to monitor off antibiotics    - on CIWA protocol, management per primary team    - monitor temps/WBC      Jackie Holland M.D.  [facility], Division of Infectious Diseases  [phone]  After 5pm on weekdays and all day on weekends - please call [phone]

## 2022-04-04 LAB
ANION GAP SERPL CALC-SCNC: 18 MMOL/L — HIGH (ref 5–17)
BUN SERPL-MCNC: 13 MG/DL — SIGNIFICANT CHANGE UP (ref 7–23)
CALCIUM SERPL-MCNC: 8.7 MG/DL — SIGNIFICANT CHANGE UP (ref 8.4–10.5)
CHLORIDE SERPL-SCNC: 100 MMOL/L — SIGNIFICANT CHANGE UP (ref 96–108)
CO2 SERPL-SCNC: 15 MMOL/L — LOW (ref 22–31)
CREAT SERPL-MCNC: 0.8 MG/DL — SIGNIFICANT CHANGE UP (ref 0.5–1.3)
EGFR: 94 ML/MIN/1.73M2 — SIGNIFICANT CHANGE UP
FOLATE SERPL-MCNC: >20 NG/ML — SIGNIFICANT CHANGE UP
GLUCOSE SERPL-MCNC: 104 MG/DL — HIGH (ref 70–99)
HCT VFR BLD CALC: 48.1 % — SIGNIFICANT CHANGE UP (ref 39–50)
HGB BLD-MCNC: 17.2 G/DL — HIGH (ref 13–17)
MAGNESIUM SERPL-MCNC: 2.2 MG/DL — SIGNIFICANT CHANGE UP (ref 1.6–2.6)
MCHC RBC-ENTMCNC: 35.2 PG — HIGH (ref 27–34)
MCHC RBC-ENTMCNC: 35.8 GM/DL — SIGNIFICANT CHANGE UP (ref 32–36)
MCV RBC AUTO: 98.4 FL — SIGNIFICANT CHANGE UP (ref 80–100)
NRBC # BLD: 0 /100 WBCS — SIGNIFICANT CHANGE UP (ref 0–0)
PHOSPHATE SERPL-MCNC: 3.1 MG/DL — SIGNIFICANT CHANGE UP (ref 2.5–4.5)
PLATELET # BLD AUTO: 88 K/UL — LOW (ref 150–400)
POTASSIUM SERPL-MCNC: 4 MMOL/L — SIGNIFICANT CHANGE UP (ref 3.5–5.3)
POTASSIUM SERPL-SCNC: 4 MMOL/L — SIGNIFICANT CHANGE UP (ref 3.5–5.3)
RBC # BLD: 4.89 M/UL — SIGNIFICANT CHANGE UP (ref 4.2–5.8)
RBC # FLD: 13.2 % — SIGNIFICANT CHANGE UP (ref 10.3–14.5)
SODIUM SERPL-SCNC: 133 MMOL/L — LOW (ref 135–145)
VIT B12 SERPL-MCNC: 887 PG/ML — SIGNIFICANT CHANGE UP (ref 232–1245)
WBC # BLD: 3.95 K/UL — SIGNIFICANT CHANGE UP (ref 3.8–10.5)
WBC # FLD AUTO: 3.95 K/UL — SIGNIFICANT CHANGE UP (ref 3.8–10.5)

## 2022-04-04 PROCEDURE — 93306 TTE W/DOPPLER COMPLETE: CPT | Mod: 26

## 2022-04-04 RX ORDER — ENOXAPARIN SODIUM 100 MG/ML
40 INJECTION SUBCUTANEOUS EVERY 24 HOURS
Refills: 0 | Status: DISCONTINUED | OUTPATIENT
Start: 2022-04-05 | End: 2022-04-11

## 2022-04-04 RX ADMIN — Medication 2 MILLIGRAM(S): at 10:10

## 2022-04-04 RX ADMIN — Medication 25 MILLIGRAM(S): at 14:21

## 2022-04-04 RX ADMIN — Medication 1 MILLIGRAM(S): at 02:09

## 2022-04-04 RX ADMIN — Medication 25 MILLIGRAM(S): at 20:59

## 2022-04-04 RX ADMIN — Medication 10 MILLIGRAM(S): at 17:31

## 2022-04-04 RX ADMIN — Medication 102.5 MILLIGRAM(S): at 11:10

## 2022-04-04 RX ADMIN — Medication 100 MILLIGRAM(S): at 17:30

## 2022-04-04 RX ADMIN — Medication 0.5 MILLIGRAM(S): at 17:30

## 2022-04-04 RX ADMIN — Medication 1 MILLIGRAM(S): at 11:10

## 2022-04-04 RX ADMIN — Medication 0.5 MILLIGRAM(S): at 14:21

## 2022-04-04 RX ADMIN — Medication 1 TABLET(S): at 11:10

## 2022-04-04 RX ADMIN — Medication 25 MILLIGRAM(S): at 06:13

## 2022-04-04 RX ADMIN — Medication 0.5 MILLIGRAM(S): at 20:59

## 2022-04-04 RX ADMIN — AMLODIPINE BESYLATE 10 MILLIGRAM(S): 2.5 TABLET ORAL at 06:13

## 2022-04-04 RX ADMIN — SENNA PLUS 2 TABLET(S): 8.6 TABLET ORAL at 21:02

## 2022-04-04 RX ADMIN — Medication 0.5 MILLIGRAM(S): at 06:13

## 2022-04-04 RX ADMIN — Medication 2 MILLIGRAM(S): at 15:18

## 2022-04-04 RX ADMIN — Medication 0.5 MILLIGRAM(S): at 09:41

## 2022-04-04 NOTE — PHYSICAL THERAPY INITIAL EVALUATION ADULT - PLANNED THERAPY INTERVENTIONS, PT EVAL
GOAL: Pt will ascend/descend one flight of steps (I) with U HR and step to step pattern in 2 weeks./balance training/bed mobility training/gait training/strengthening/transfer training

## 2022-04-04 NOTE — PHYSICAL THERAPY INITIAL EVALUATION ADULT - PERTINENT HX OF CURRENT PROBLEM, REHAB EVAL
Pt is a 73 y/o M with PMH: Infrarenal IVC Thrombus / LLE DVT / B/LPE s/p IR iliocaval venous thrombosis mechanical thrombectomy  (Sept 2021, on Xarelto as per EMR),  Cirrhosis, Alcohol use disorder (recent hospitalization for Alcohol Withdrawal symptoms of Seizures and Delirium) brought to [facility] s/p fall , found down at the bottom of the staircase on his back per EMS. Pt a/w alchol intoxication, placed on CIWA protocol

## 2022-04-04 NOTE — PHYSICAL THERAPY INITIAL EVALUATION ADULT - STRENGTHENING, PT EVAL
GOAL: Pt will improve bilateral LE strength to 5/5  for increased limb stability, to improve gait and facilitate stair negotiation in 2 weeks.

## 2022-04-04 NOTE — PROGRESS NOTE ADULT - SUBJECTIVE AND OBJECTIVE BOX
Patient is a 72y old  Male who presents with a chief complaint of Found at the bottom of the stairs at home by spouse with patient intoxicated, with spouse also intoxicated per EMS (03 Apr 2022 16:16)      SUBJECTIVE / OVERNIGHT EVENTS:    Patient seen and examined. denies cp sob. still feels anxious and jittery.   pt tells me a couple months ago in DWI and went to court and was told he had to go to 30 days rehab. pt states that is one of the reasons he came to hospital.      Vital Signs Last 24 Hrs  T(C): 37 (04 Apr 2022 06:18), Max: 37.4 (03 Apr 2022 12:10)  T(F): 98.6 (04 Apr 2022 06:18), Max: 99.4 (03 Apr 2022 12:10)  HR: 78 (04 Apr 2022 06:18) (78 - 100)  BP: 150/78 (04 Apr 2022 06:18) (132/80 - 152/83)  BP(mean): --  RR: 18 (04 Apr 2022 06:18) (18 - 18)  SpO2: 95% (04 Apr 2022 06:18) (94% - 96%)  I&O's Summary    03 Apr 2022 07:01  -  04 Apr 2022 07:00  --------------------------------------------------------  IN: 1285 mL / OUT: 2000 mL / NET: -715 mL        PE:  GENERAL: NAD, AAOx2  HEAD:  Atraumatic, Normocephalic  CHEST/LUNG: CTABL, No wheeze  HEART: Regular rate and rhythm; no murmur  ABDOMEN: Soft, Nontender, Nondistended; Bowel sounds present  EXTREMITIES:  2+ Peripheral Pulses, No clubbing, cyanosis, or edema  NEURO: No focal deficits. tremors  psych: jittery anxious    LABS:                        17.2   3.95  )-----------( 88       ( 04 Apr 2022 07:23 )             48.1     04-04    133<L>  |  100  |  13  ----------------------------<  104<H>  4.0   |  15<L>  |  0.80    Ca    8.7      04 Apr 2022 07:24  Phos  3.1     04-04  Mg     2.2     04-04    TPro  x   /  Alb  x   /  TBili  1.0  /  DBili  x   /  AST  x   /  ALT  x   /  AlkPhos  x   04-03    PT/INR - ( 03 Apr 2022 07:34 )   PT: 12.6 sec;   INR: 1.09 ratio           CAPILLARY BLOOD GLUCOSE                RADIOLOGY & ADDITIONAL TESTS:    Imaging Personally Reviewed:  [x] YES  [ ] NO    Consultant(s) Notes Reviewed:  [x] YES  [ ] NO    MEDICATIONS  (STANDING):  amLODIPine   Tablet 10 milliGRAM(s) Oral daily  bisacodyl Suppository 10 milliGRAM(s) Rectal once  dextrose 5%. 1000 milliLiter(s) (50 mL/Hr) IV Continuous <Continuous>  dextrose 5%. 1000 milliLiter(s) (100 mL/Hr) IV Continuous <Continuous>  dextrose 50% Injectable 25 Gram(s) IV Push once  dextrose 50% Injectable 12.5 Gram(s) IV Push once  dextrose 50% Injectable 25 Gram(s) IV Push once  folic acid 1 milliGRAM(s) Oral daily  glucagon  Injectable 1 milliGRAM(s) IntraMuscular once  hydrALAZINE 25 milliGRAM(s) Oral every 8 hours  LORazepam     Tablet   Oral   LORazepam     Tablet 0.5 milliGRAM(s) Oral every 4 hours  multivitamin 1 Tablet(s) Oral daily  senna 2 Tablet(s) Oral at bedtime  sodium chloride 0.9%. 1000 milliLiter(s) (60 mL/Hr) IV Continuous <Continuous>  thiamine IVPB 250 milliGRAM(s) IV Intermittent daily    MEDICATIONS  (PRN):  acetaminophen     Tablet .. 650 milliGRAM(s) Oral every 6 hours PRN Temp greater or equal to 38C (100.4F), Moderate Pain (4 - 6)  dextrose Oral Gel 15 Gram(s) Oral once PRN Blood Glucose LESS THAN 70 milliGRAM(s)/deciliter  LORazepam     Tablet 2 milliGRAM(s) Oral every 2 hours PRN Symptom-triggered: 2 point increase in CIWA -Ar score and a total score of 7 or LESS  LORazepam   Injectable 2 milliGRAM(s) IV Push every 1 hour PRN Symptom-triggered: each CIWA -Ar score 8 or GREATER  polyethylene glycol 3350 17 Gram(s) Oral daily PRN Constipation      Care Discussed with Consultants/Other Providers [x] YES  [ ] NO    HEALTH ISSUES - PROBLEM Dx:  Alcohol withdrawal    Elevated lactic acid level    History of deep venous thrombosis    Discharge planning issues

## 2022-04-04 NOTE — PROGRESS NOTE ADULT - ASSESSMENT
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     # ETOH abuse  # cirrhosis  etoh cessation discussed, pt states he is interested in quitting  pt states he had a DWI couple months ago and was mandated by court to go to 30 day rehab program - will ask SW to fu  cont CIWA,  Ativan taper  Abd US hepatic steatosis    # fever  afebrile  BC strep epi likely contaminant  fu repeat BC  no abx  ID following    # hx extensive LLE DVT sp thrombectomy and bl PE august 2021  appreciate heme recs re AC  pt is a unreliable historian, he has not followed up with any doctor since previous admission  repeat doppler US neg for DVT  given high fall risk and unreliable follow up, will stop xarelto  instructed patient to fu with heme outpt for hypercoagulable work up, pt repeated this information to me    dvt ppx lovenox    dc when taper finished  SW to fu as pt high risk to drink again if discharged home  wife currently hospitalized    [facility] [phone] 71yo M PMHx etoh abuse with past ictal episodes and delirium, with admission to [place] in August 2021 with an extensive LEFT LE DVT, 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     # ETOH abuse  # hepatic steatosis  etoh cessation discussed, pt states he is interested in quitting  pt states he had a DWI couple months ago and was mandated by court to go to 30 day rehab program - will ask SW to fu  cont CIWA,  Ativan taper  Abd US hepatic steatosis    # fever  afebrile  BC strep epi likely contaminant  fu repeat BC  no abx  ID following    # hx extensive LLE DVT sp thrombectomy and bl PE august 2021  appreciate heme recs re AC  pt is a unreliable historian, he has not followed up with any doctor since previous admission  repeat doppler US neg for DVT  given high fall risk and unreliable follow up, will stop xarelto  instructed patient to fu with heme outpt for hypercoagulable work up, pt repeated this information to me    dvt ppx lovenox    dc when taper finished  SW to fu as pt high risk to drink again if discharged home  wife currently hospitalized    [facility] [phone]

## 2022-04-04 NOTE — PROGRESS NOTE ADULT - ASSESSMENT
Patient is a 72 year old male with PMH of ethanol abuse with past ictal episodes and delirium, with admission to [place] in August 2021 with an extensive LEFT LE DVT, with a prior hospitalization at [place] prior to the [place] admission, with patient with a prior infrarenal IVC thrombus into the LEFT  common iliac and external iliac vein to the common femoral vein with B/L pulmonary emboli on CTT chest imaging with IR iliocaval venous thrombosis mechanical thrombectomy, cirrhosis on US, with patient discharged 9/1/21, but now brought by EMS following spouse found patient at the bottom of a staircase on his back.     Fever  Alcohol abuse with withdrawal  Dehydration due to alcohol abuse   Thrombocytopenia likely due to above   Transaminitis in setting of above and hepatic steatosis    - intermittent fever - last fever 4/2, lactic acidosis normalized, WBC trended down    -- suspect fevers likely due to alcohol withdrawal   - COVID PCR negative    - CXR with no consolidation/infiltrates    - CT C/A/P with no infectious source, noted with hepatic steatosis    - UA negative, no urinary symptoms    - L forearm with skin tear - does not appear infected    - 1/2 blood cultures with CoNS-Staph epi      -- likely skin contaminant, would not treat at this time   - will repeat blood cultures x2   - continue to monitor off antibiotics    - on CIWA protocol, management per primary team    - monitor temps/WBC    Infectious Diseases will continue to follow. Please call with any questions.   Katty Nguyen M.D.  [facility], Division of Infectious Diseases [phone]

## 2022-04-04 NOTE — PHYSICAL THERAPY INITIAL EVALUATION ADULT - PRECAUTIONS/LIMITATIONS, REHAB EVAL
XRAY PELVIS (3/31): (-) XRAY CHEST (3/31): clear. CT HEAD (3/31): (-). CT CHEST (3/31): Hepatic steatosis. CT C/S (3/31): (-) fracture. CT ABD/PELVIS (3/31): (-); hepatic steatosis. US ABD (4/1): Mild hepatomegaly and diffuse hepatic steatosis. VA DUPLEX BLE (4/1): no DVT in either LE.

## 2022-04-04 NOTE — PROGRESS NOTE ADULT - SUBJECTIVE AND OBJECTIVE BOX
[facility], Division of Infectious Diseases  [address] Y. Patel, S. Shah, [facility]  [phone]    Name: OVIDIO TOBIAS  Age: 72y  Gender: Male  MRN: [id number]    Interval History:  Patient seen and examined at bedside this morning  No acute overnight events. Afebrile  Still having some tremors  Denies fever  Notes reviewed    Antibiotics:      Medications:  acetaminophen     Tablet .. 650 milliGRAM(s) Oral every 6 hours PRN  amLODIPine   Tablet 10 milliGRAM(s) Oral daily  bisacodyl Suppository 10 milliGRAM(s) Rectal once  dextrose 5%. 1000 milliLiter(s) IV Continuous <Continuous>  dextrose 5%. 1000 milliLiter(s) IV Continuous <Continuous>  dextrose 50% Injectable 25 Gram(s) IV Push once  dextrose 50% Injectable 12.5 Gram(s) IV Push once  dextrose 50% Injectable 25 Gram(s) IV Push once  dextrose Oral Gel 15 Gram(s) Oral once PRN  folic acid 1 milliGRAM(s) Oral daily  glucagon  Injectable 1 milliGRAM(s) IntraMuscular once  hydrALAZINE 25 milliGRAM(s) Oral every 8 hours  LORazepam     Tablet   Oral   LORazepam     Tablet 2 milliGRAM(s) Oral every 2 hours PRN  LORazepam     Tablet 0.5 milliGRAM(s) Oral every 4 hours  LORazepam   Injectable 2 milliGRAM(s) IV Push every 1 hour PRN  multivitamin 1 Tablet(s) Oral daily  polyethylene glycol 3350 17 Gram(s) Oral daily PRN  senna 2 Tablet(s) Oral at bedtime  sodium chloride 0.9%. 1000 milliLiter(s) IV Continuous <Continuous>  thiamine IVPB 250 milliGRAM(s) IV Intermittent daily      Review of Systems:  Review of systems otherwise negative except as previously noted.    Allergies: No Known Allergies    For details regarding the patient's past medical history, social history, family history, and other miscellaneous elements, please refer the initial infectious diseases consultation and/or the admitting history and physical examination for this admission.    Objective:  Vitals:   T(C): 37 (04-04-22 @ 06:18), Max: 37.4 (04-03-22 @ 12:10)  HR: 78 (04-04-22 @ 06:18) (78 - 100)  BP: 150/78 (04-04-22 @ 06:18) (132/80 - 152/83)  RR: 18 (04-04-22 @ 06:18) (18 - 18)  SpO2: 95% (04-04-22 @ 06:18) (94% - 96%)    Physical Examination:  General: no acute distress  HEENT: NC/AT, EOMI, face appears flushed  Cardio: S1, S2 heard, RRR, no murmurs  Resp: breath sounds heard bilaterally  Abd: soft, NT, ND  Ext: no edema or cyanosis  Skin: warm, dry, no visible rash      Laboratory Studies:  CBC:                       17.2   3.95  )-----------( 88       ( 04 Apr 2022 07:23 )             48.1     CMP: 04-04    133<L>  |  100  |  13  ----------------------------<  104<H>  4.0   |  15<L>  |  0.80    Ca    8.7      04 Apr 2022 07:24  Phos  3.1     04-04  Mg     2.2     04-04    TPro  x   /  Alb  x   /  TBili  1.0  /  DBili  x   /  AST  x   /  ALT  x   /  AlkPhos  x   04-03          Microbiology: reviewed    Culture - Urine (collected 04-01-22 @ 22:04)  Source: Clean Catch Clean Catch (Midstream)  Final Report (04-02-22 @ 19:11):    <10,000 CFU/mL Normal Urogenital Diane    Culture - Blood (collected 04-01-22 @ 22:04)  Source: .Blood Blood  Gram Stain (04-02-22 @ 17:23):    Growth in anaerobic bottle: Gram Positive Cocci in Clusters    Growth in aerobic bottle: Gram Positive Cocci in Clusters  Final Report (04-03-22 @ 17:37):    Growth in aerobic and anaerobic bottles: Staphylococcus capitis    Growth in anaerobic bottle: Staphylococcus epidermidis    Coag Negative Staphylococcus    Single set isolate, possible contaminant. Contact    Microbiology if susceptibility testing clinically    indicated.    ***Blood Panel PCR results on this specimen are available    approximately 3 hours after the Gram stain result.***    Gram stain, PCR, and/or culture results may not always    correspond due to difference in methodologies.    ************************************************************    This PCR assay was performed by multiplex PCR. This    Assay tests for 66 bacterial and resistance gene targets.    Please refer to the [facility] test directory    at [email or web address] for details.  Organism: Blood Culture PCR (04-03-22 @ 17:37)  Organism: Blood Culture PCR (04-03-22 @ 17:37)      -  Staphylococcus epidermidis: Detec      Method Type: PCR    Culture - Blood (collected 04-01-22 @ 22:04)  Source: .Blood Blood  Preliminary Report (04-02-22 @ 23:01):    No growth to date.        Radiology: reviewed

## 2022-04-05 RX ADMIN — Medication 0.5 MILLIGRAM(S): at 18:24

## 2022-04-05 RX ADMIN — Medication 1 TABLET(S): at 14:26

## 2022-04-05 RX ADMIN — Medication 102.5 MILLIGRAM(S): at 14:35

## 2022-04-05 RX ADMIN — SENNA PLUS 2 TABLET(S): 8.6 TABLET ORAL at 21:37

## 2022-04-05 RX ADMIN — Medication 25 MILLIGRAM(S): at 05:33

## 2022-04-05 RX ADMIN — ENOXAPARIN SODIUM 40 MILLIGRAM(S): 100 INJECTION SUBCUTANEOUS at 05:34

## 2022-04-05 RX ADMIN — Medication 1 MILLIGRAM(S): at 14:25

## 2022-04-05 RX ADMIN — Medication 2 MILLIGRAM(S): at 09:52

## 2022-04-05 RX ADMIN — AMLODIPINE BESYLATE 10 MILLIGRAM(S): 2.5 TABLET ORAL at 05:32

## 2022-04-05 RX ADMIN — Medication 100 MILLIGRAM(S): at 20:36

## 2022-04-05 RX ADMIN — Medication 0.5 MILLIGRAM(S): at 01:37

## 2022-04-05 RX ADMIN — Medication 100 MILLIGRAM(S): at 10:08

## 2022-04-05 RX ADMIN — Medication 25 MILLIGRAM(S): at 14:29

## 2022-04-05 RX ADMIN — Medication 25 MILLIGRAM(S): at 21:37

## 2022-04-05 RX ADMIN — Medication 2 MILLIGRAM(S): at 11:31

## 2022-04-05 NOTE — PROGRESS NOTE ADULT - NSPROGADDITIONALINFOA_GEN_ALL_CORE
Dr. Kevin will take over care tomorrow.  Please contact with any questions or concerns [phone].

## 2022-04-05 NOTE — PROGRESS NOTE ADULT - ASSESSMENT
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     # ETOH abuse  # hepatic steatosis  etoh cessation discussed, pt states he is interested in quitting  pt states he had a DUI couple months ago and was mandated by court to go to 30 day rehab program - [facility]  cont CIWA, Aivan taper  Abd US hepatic steatosis    # fever  afebrile  BC strep epi likely contaminant  repeat BC NTD, no abx, ID following    # hx extensive LLE DVT sp thrombectomy and bl PE august 2021  appreciate heme recs re AC  pt is a unreliable historian, he has not followed up with any doctor since previous admission  repeat doppler US neg for DVT  given high fall risk and unreliable follow up, will stop xarelto  fu hyper coag linton    dvt ppx lovenox    dc when taper finished  SW to fu as pt high risk for relapse, safe dc plan  wife currently hospitalized    Please call [facility] with questions [phone]

## 2022-04-05 NOTE — PROGRESS NOTE ADULT - ASSESSMENT
Patient is a 72 year old male with PMH of ethanol abuse with past ictal episodes and delirium, with admission to [facility] in August 2021 with an extensive LEFT LE DVT, with a prior hospitalization at [place] prior to the [facility] admission, with patient with a prior infrarenal IVC thrombus into the LEFT  common iliac and external iliac vein to the common femoral vein with B/L pulmonary emboli on CTT chest imaging with IR iliocaval venous thrombosis mechanical thrombectomy, cirrhosis on US, with patient discharged 9/1/21, but now brought by EMS following spouse found patient at the bottom of a staircase on his back.     Fever  Alcohol abuse with withdrawal  Dehydration due to alcohol abuse   Thrombocytopenia likely due to above   Transaminitis in setting of above and hepatic steatosis    - intermittent fever - last fever 4/2, lactic acidosis normalized, WBC trended down    -- suspect fevers likely due to alcohol withdrawal   - COVID PCR negative    - CXR with no consolidation/infiltrates    - CT C/A/P with no infectious source, noted with hepatic steatosis    - UA negative, no urinary symptoms    - L forearm with skin tear - does not appear infected    - 1/2 blood cultures with CoNS-Staph epi, repeat from 4/3 NGTD     -- likely skin contaminant, would not treat at this time   - continue to monitor off antibiotics    - on CIWA protocol, management per primary team    - monitor temps/WBC    D/c planning per primary team when clinically stable    Infectious Diseases will continue to follow. Please call with any questions.   Katty Nguyen M.D.  [facility], Division of Infectious Diseases [phone]

## 2022-04-05 NOTE — PROGRESS NOTE ADULT - SUBJECTIVE AND OBJECTIVE BOX
Patient is a 72y old  Male who presents with a chief complaint of Found at the bottom of the stairs at home by spouse with patient intoxicated, with spouse also intoxicated per EMS (05 Apr 2022 12:48)      SUBJECTIVE / OVERNIGHT EVENTS:    Patient seen and examined. no acute events. co jittery. no cp sob.      Vital Signs Last 24 Hrs  T(C): 37.3 (05 Apr 2022 05:22), Max: 37.3 (05 Apr 2022 05:22)  T(F): 99.1 (05 Apr 2022 05:22), Max: 99.1 (05 Apr 2022 05:22)  HR: 92 (05 Apr 2022 05:22) (92 - 98)  BP: 145/76 (05 Apr 2022 05:22) (145/76 - 158/84)  BP(mean): --  RR: 18 (05 Apr 2022 05:22) (18 - 18)  SpO2: 96% (05 Apr 2022 05:22) (96% - 96%)  I&O's Summary    04 Apr 2022 07:01  -  05 Apr 2022 07:00  --------------------------------------------------------  IN: 480 mL / OUT: 200 mL / NET: 280 mL        PE:  GENERAL: NAD, AAOx2  HEAD:  Atraumatic, Normocephalic  CHEST/LUNG: CTABL, No wheeze  HEART: Regular rate and rhythm; no murmur  ABDOMEN: Soft, Nontender, Nondistended; Bowel sounds present  EXTREMITIES:  2+ Peripheral Pulses, No clubbing, cyanosis, or edema  NEURO: No focal deficits  psych: jittery anxious    LABS:                        17.2   3.95  )-----------( 88       ( 04 Apr 2022 07:23 )             48.1     04-04    133<L>  |  100  |  13  ----------------------------<  104<H>  4.0   |  15<L>  |  0.80    Ca    8.7      04 Apr 2022 07:24  Phos  3.1     04-04  Mg     2.2     04-04        CAPILLARY BLOOD GLUCOSE                RADIOLOGY & ADDITIONAL TESTS:    Imaging Personally Reviewed:  [x] YES  [ ] NO    Consultant(s) Notes Reviewed:  [x] YES  [ ] NO    MEDICATIONS  (STANDING):  amLODIPine   Tablet 10 milliGRAM(s) Oral daily  dextrose 5%. 1000 milliLiter(s) (100 mL/Hr) IV Continuous <Continuous>  dextrose 5%. 1000 milliLiter(s) (50 mL/Hr) IV Continuous <Continuous>  dextrose 50% Injectable 25 Gram(s) IV Push once  dextrose 50% Injectable 12.5 Gram(s) IV Push once  dextrose 50% Injectable 25 Gram(s) IV Push once  enoxaparin Injectable 40 milliGRAM(s) SubCutaneous every 24 hours  folic acid 1 milliGRAM(s) Oral daily  glucagon  Injectable 1 milliGRAM(s) IntraMuscular once  hydrALAZINE 25 milliGRAM(s) Oral every 8 hours  LORazepam     Tablet   Oral   LORazepam     Tablet 0.5 milliGRAM(s) Oral every 12 hours  multivitamin 1 Tablet(s) Oral daily  senna 2 Tablet(s) Oral at bedtime  sodium chloride 0.9%. 1000 milliLiter(s) (60 mL/Hr) IV Continuous <Continuous>  thiamine IVPB 250 milliGRAM(s) IV Intermittent daily    MEDICATIONS  (PRN):  acetaminophen     Tablet .. 650 milliGRAM(s) Oral every 6 hours PRN Temp greater or equal to 38C (100.4F), Moderate Pain (4 - 6)  dextrose Oral Gel 15 Gram(s) Oral once PRN Blood Glucose LESS THAN 70 milliGRAM(s)/deciliter  guaiFENesin Oral Liquid (Sugar-Free) 100 milliGRAM(s) Oral every 6 hours PRN Cough  LORazepam     Tablet 2 milliGRAM(s) Oral every 2 hours PRN Symptom-triggered: 2 point increase in CIWA -Ar score and a total score of 7 or LESS  LORazepam   Injectable 2 milliGRAM(s) IV Push every 1 hour PRN Symptom-triggered: each CIWA -Ar score 8 or GREATER  polyethylene glycol 3350 17 Gram(s) Oral daily PRN Constipation      Care Discussed with Consultants/Other Providers [x] YES  [ ] NO    HEALTH ISSUES - PROBLEM Dx:  Alcohol withdrawal    Elevated lactic acid level    History of deep venous thrombosis    Discharge planning issues

## 2022-04-06 LAB
AT III ACT/NOR PPP CHRO: 70 % — LOW (ref 85–135)
CULTURE RESULTS: SIGNIFICANT CHANGE UP
DRVVT SCREEN TO CONFIRM RATIO: SIGNIFICANT CHANGE UP
LA NT DPL PPP QL: 36.7 SEC — SIGNIFICANT CHANGE UP
NORMALIZED SCT PPP-RTO: 0.99 RATIO — SIGNIFICANT CHANGE UP (ref 0–1.16)
NORMALIZED SCT PPP-RTO: SIGNIFICANT CHANGE UP
PROT C ACT/NOR PPP: 108 % — SIGNIFICANT CHANGE UP (ref 74–150)
PROT S FREE AG PPP IA-ACNC: 83 % — SIGNIFICANT CHANGE UP (ref 67–141)
SPECIMEN SOURCE: SIGNIFICANT CHANGE UP

## 2022-04-06 RX ORDER — SIMETHICONE 80 MG/1
80 TABLET, CHEWABLE ORAL ONCE
Refills: 0 | Status: COMPLETED | OUTPATIENT
Start: 2022-04-06 | End: 2022-04-06

## 2022-04-06 RX ADMIN — Medication 1 MILLIGRAM(S): at 13:04

## 2022-04-06 RX ADMIN — Medication 0.5 MILLIGRAM(S): at 17:10

## 2022-04-06 RX ADMIN — Medication 2 MILLIGRAM(S): at 09:34

## 2022-04-06 RX ADMIN — Medication 25 MILLIGRAM(S): at 13:25

## 2022-04-06 RX ADMIN — Medication 102.5 MILLIGRAM(S): at 13:05

## 2022-04-06 RX ADMIN — SENNA PLUS 2 TABLET(S): 8.6 TABLET ORAL at 21:44

## 2022-04-06 RX ADMIN — Medication 650 MILLIGRAM(S): at 22:32

## 2022-04-06 RX ADMIN — Medication 1 TABLET(S): at 13:04

## 2022-04-06 RX ADMIN — Medication 25 MILLIGRAM(S): at 05:14

## 2022-04-06 RX ADMIN — SIMETHICONE 80 MILLIGRAM(S): 80 TABLET, CHEWABLE ORAL at 20:06

## 2022-04-06 RX ADMIN — Medication 25 MILLIGRAM(S): at 21:43

## 2022-04-06 RX ADMIN — SODIUM CHLORIDE 60 MILLILITER(S): 9 INJECTION INTRAMUSCULAR; INTRAVENOUS; SUBCUTANEOUS at 15:34

## 2022-04-06 RX ADMIN — Medication 650 MILLIGRAM(S): at 21:43

## 2022-04-06 RX ADMIN — Medication 0.5 MILLIGRAM(S): at 05:14

## 2022-04-06 RX ADMIN — Medication 100 MILLIGRAM(S): at 08:43

## 2022-04-06 RX ADMIN — Medication 2 MILLIGRAM(S): at 13:04

## 2022-04-06 RX ADMIN — AMLODIPINE BESYLATE 10 MILLIGRAM(S): 2.5 TABLET ORAL at 05:14

## 2022-04-06 RX ADMIN — ENOXAPARIN SODIUM 40 MILLIGRAM(S): 100 INJECTION SUBCUTANEOUS at 05:14

## 2022-04-06 NOTE — CHART NOTE - NSCHARTNOTEFT_GEN_A_CORE
Nutrition Follow Up Note  Patient seen for malnutrition follow up    Chart reviewed, events noted.    Source: [x] Patient       [x] EMR        [x] RN        [] Family at bedside       [] Other:    -If unable to interview patient: [] Trach/Vent/BiPAP  [] Disoriented/confused/inappropriate to interview as per chart     Pt confused as per chart - confirmed information with RN. Pt reports poor PO intake due to food dislike. States food is "high in fat" - refused DASH/TLC diet. RN states pt consuming 50-75% of the meals. Pt agreed to try Reduced Sugar Mighty Shakes -refused all other supplements due to "sugar content". Denies difficulty chewing/swallowing. Pt denies nausea, vomiting, diarrhea, or constipation, last BM (04/03). Reviewed recommendations to optimize kcal and protein intake. Pt denies having further questions/concerns about diet and nutrition - made aware RD remains available.     Diet Order:   Diet, Regular:   Consistent Carbohydrate {No Snacks} (CSTCHO) (04-01-22)    Weights: (03/31) 175 pounds -?accuracy due to fluid accumulation.     Nutritionally Pertinent MEDICATIONS  (STANDING):  amLODIPine   Tablet  dextrose 5%.  dextrose 5%.  dextrose 50% Injectable  dextrose 50% Injectable  dextrose 50% Injectable  folic acid  glucagon  Injectable  hydrALAZINE  multivitamin  senna  sodium chloride 0.9%.  thiamine IVPB    Altered pertinent Labs: (04/04) Na 133,     A1C with Estimated Average Glucose Result: 5.6 % (04-02-22 @ 12:19)    Finger Sticks: none     Skin per nursing documentation: no pressure injuries.   Edema as per flow sheets: +1 jan. foot.     Estimated Needs:   [x] no change since previous assessment  [] recalculated:     Previous Nutrition Diagnosis: Malnutrition; mild     Nutrition Diagnosis is: [x] ongoing - being addressed with PO intake encouragement and recommendations for nutritional supplements.     New Nutrition Diagnosis: [x] Not applicable    Nutrition Care Plan:  [x] In Progress     Nutrition Interventions:     Education Provided:       [x] Yes  [] No    Recommendations:      1. Recommend regular diet. Will continue to monitor as able and adjust as needed.   2. RD will provide Diet Mighty Shakes in each meal to optimize kcal and protein intake.  3. Encourage PO intake and honor food preferences - reviewed menu order procedures.   4. Continue micronutrient supplementation as ordered if no medical contraindications.   5. Reviewed recommendations to optimize kcal and protein intake.  6. Obtain current weight as able to identify changes if any.     Monitoring and Evaluation:   Continue to monitor nutritional intake, tolerance to diet prescription, weights, labs, skin integrity    RD remains available upon request and will follow up per protocol  Sil Mello MS RDN CDN #[phone]

## 2022-04-06 NOTE — PROGRESS NOTE ADULT - ASSESSMENT
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     # ETOH abuse  # hepatic steatosis  etoh cessation discussed, pt states he is interested in quitting  pt states he had a DUI couple months ago and was mandated by court to go to 30 day rehab program - [facility]  cont CIWA, Aivan taper  Abd US hepatic steatosis    # fever  afebrile  BC strep epi likely contaminant  repeat BC NTD, no abx, ID following    # hx extensive LLE DVT sp thrombectomy and bl PE august 2021  appreciate heme recs re AC  pt is a unreliable historian, he has not followed up with any doctor since previous admission  repeat doppler US neg for DVT  given high fall risk and unreliable follow up, will stop xarelto  fu hyper coag linton    dvt ppx lovenox    dc when taper finished  SW to fu as pt high risk for relapse, safe dc plan  wife currently hospitalized    Please call [facility] with questions [phone]

## 2022-04-06 NOTE — PROGRESS NOTE ADULT - ASSESSMENT
Patient is a 72 year old male with PMH of ethanol abuse with past ictal episodes and delirium, with admission to [place] in August 2021 with an extensive LEFT LE DVT, with a prior hospitalization at [facility] prior to the [facility] admission, with patient with a prior infrarenal IVC thrombus into the LEFT  common iliac and external iliac vein to the common femoral vein with B/L pulmonary emboli on CTT chest imaging with IR iliocaval venous thrombosis mechanical thrombectomy, cirrhosis on US, with patient discharged 9/1/21, but now brought by EMS following spouse found patient at the bottom of a staircase on his back.     Fever  Alcohol abuse with withdrawal  Dehydration due to alcohol abuse   Thrombocytopenia likely due to above   Transaminitis in setting of above and hepatic steatosis    - intermittent fever - last fever 4/2, lactic acidosis normalized, WBC trended down    -- suspect fevers likely due to alcohol withdrawal   - COVID PCR negative    - CXR with no consolidation/infiltrates    - CT C/A/P with no infectious source, noted with hepatic steatosis    - UA negative, no urinary symptoms    - L forearm with skin tear - does not appear infected    - 1/2 blood cultures with CoNS-Staph epi, repeat from 4/3 NGTD     -- likely skin contaminant, would not treat at this time   - continue to monitor off antibiotics    - on CIWA protocol, management per primary team    - monitor temps/WBC    D/c planning per primary team when clinically stable    Infectious Diseases will continue to follow. Please call with any questions.   Katty Nguyen M.D.  [facility], Division of Infectious Diseases [phone]

## 2022-04-06 NOTE — PROGRESS NOTE ADULT - SUBJECTIVE AND OBJECTIVE BOX
[facility], Division of Infectious Diseases  PIOTR Fielsd Y. Patel, S. Shah, G. [facility]  [phone]    Name: OVIDIO TOBIAS  Age: 72y  Gender: Male  MRN: [id number]    Interval History:  Patient seen and examined at bedside this morning  No acute overnight events. Afebrile  Notes reviewed    Antibiotics:      Medications:  acetaminophen     Tablet .. 650 milliGRAM(s) Oral every 6 hours PRN  amLODIPine   Tablet 10 milliGRAM(s) Oral daily  dextrose 5%. 1000 milliLiter(s) IV Continuous <Continuous>  dextrose 5%. 1000 milliLiter(s) IV Continuous <Continuous>  dextrose 50% Injectable 25 Gram(s) IV Push once  dextrose 50% Injectable 12.5 Gram(s) IV Push once  dextrose 50% Injectable 25 Gram(s) IV Push once  dextrose Oral Gel 15 Gram(s) Oral once PRN  enoxaparin Injectable 40 milliGRAM(s) SubCutaneous every 24 hours  folic acid 1 milliGRAM(s) Oral daily  glucagon  Injectable 1 milliGRAM(s) IntraMuscular once  guaiFENesin Oral Liquid (Sugar-Free) 100 milliGRAM(s) Oral every 6 hours PRN  hydrALAZINE 25 milliGRAM(s) Oral every 8 hours  LORazepam     Tablet   Oral   LORazepam     Tablet 0.5 milliGRAM(s) Oral every 12 hours  LORazepam     Tablet 2 milliGRAM(s) Oral every 2 hours PRN  LORazepam   Injectable 2 milliGRAM(s) IV Push every 1 hour PRN  multivitamin 1 Tablet(s) Oral daily  polyethylene glycol 3350 17 Gram(s) Oral daily PRN  senna 2 Tablet(s) Oral at bedtime  sodium chloride 0.9%. 1000 milliLiter(s) IV Continuous <Continuous>  thiamine IVPB 250 milliGRAM(s) IV Intermittent daily      Review of Systems:  Review of systems otherwise negative except as previously noted.    Allergies: No Known Allergies    For details regarding the patient's past medical history, social history, family history, and other miscellaneous elements, please refer the initial infectious diseases consultation and/or the admitting history and physical examination for this admission.    Objective:  Vitals:   T(C): 36.9 (04-06-22 @ 09:37), Max: 37.4 (04-06-22 @ 05:08)  HR: 97 (04-06-22 @ 09:37) (68 - 97)  BP: 139/81 (04-06-22 @ 09:37) (103/61 - 160/77)  RR: 18 (04-06-22 @ 09:37) (18 - 18)  SpO2: 95% (04-06-22 @ 09:37) (95% - 97%)    Physical Examination:  General: no acute distress  HEENT: NC/AT, EOMI  Cardio: S1, S2 heard, RRR, no murmurs  Resp: breath sounds heard bilaterally, no rales, wheezes or rhonchi  Abd: soft, NT, ND  Ext: no edema or cyanosis  Skin: warm, dry, no visible rash      Laboratory Studies:  CBC:   CMP:             Microbiology: reviewed    Culture - Blood (collected 04-03-22 @ 16:18)  Source: .Blood Blood  Preliminary Report (04-04-22 @ 17:01):    No growth to date.    Culture - Blood (collected 04-03-22 @ 16:18)  Source: .Blood Blood  Preliminary Report (04-04-22 @ 17:01):    No growth to date.    Culture - Urine (collected 04-01-22 @ 22:04)  Source: Clean Catch Clean Catch (Midstream)  Final Report (04-02-22 @ 19:11):    <10,000 CFU/mL Normal Urogenital Diane    Culture - Blood (collected 04-01-22 @ 22:04)  Source: .Blood Blood  Gram Stain (04-02-22 @ 17:23):    Growth in anaerobic bottle: Gram Positive Cocci in Clusters    Growth in aerobic bottle: Gram Positive Cocci in Clusters  Final Report (04-03-22 @ 17:37):    Growth in aerobic and anaerobic bottles: Staphylococcus capitis    Growth in anaerobic bottle: Staphylococcus epidermidis    Coag Negative Staphylococcus    Single set isolate, possible contaminant. Contact    Microbiology if susceptibility testing clinically    indicated.    ***Blood Panel PCR results on this specimen are available    approximately 3 hours after the Gram stain result.***    Gram stain, PCR, and/or culture results may not always    correspond due to difference in methodologies.    ************************************************************    This PCR assay was performed by multiplex PCR. This    Assay tests for 66 bacterial and resistance gene targets.    Please refer to the [facility] test directory    at [email or web address] for details.  Organism: Blood Culture PCR (04-03-22 @ 17:37)  Organism: Blood Culture PCR (04-03-22 @ 17:37)      -  Staphylococcus epidermidis: Detec      Method Type: PCR    Culture - Blood (collected 04-01-22 @ 22:04)  Source: .Blood Blood  Preliminary Report (04-02-22 @ 23:01):    No growth to date.        Radiology: reviewed       [facility], Division of Infectious Diseases  PIOTR Fields Y. Patel, S. Shah, G. [facility]  [phone]    Name: OVIDIO TOBIAS  Age: 72y  Gender: Male  MRN: [id number]    Interval History:  Patient seen and examined at bedside this morning  No acute overnight events. Afebrile  Upset, he wants to know his wifes condition  Denies f/c/n/v/d  Notes reviewed    Antibiotics:      Medications:  acetaminophen     Tablet .. 650 milliGRAM(s) Oral every 6 hours PRN  amLODIPine   Tablet 10 milliGRAM(s) Oral daily  dextrose 5%. 1000 milliLiter(s) IV Continuous <Continuous>  dextrose 5%. 1000 milliLiter(s) IV Continuous <Continuous>  dextrose 50% Injectable 25 Gram(s) IV Push once  dextrose 50% Injectable 12.5 Gram(s) IV Push once  dextrose 50% Injectable 25 Gram(s) IV Push once  dextrose Oral Gel 15 Gram(s) Oral once PRN  enoxaparin Injectable 40 milliGRAM(s) SubCutaneous every 24 hours  folic acid 1 milliGRAM(s) Oral daily  glucagon  Injectable 1 milliGRAM(s) IntraMuscular once  guaiFENesin Oral Liquid (Sugar-Free) 100 milliGRAM(s) Oral every 6 hours PRN  hydrALAZINE 25 milliGRAM(s) Oral every 8 hours  LORazepam     Tablet   Oral   LORazepam     Tablet 0.5 milliGRAM(s) Oral every 12 hours  LORazepam     Tablet 2 milliGRAM(s) Oral every 2 hours PRN  LORazepam   Injectable 2 milliGRAM(s) IV Push every 1 hour PRN  multivitamin 1 Tablet(s) Oral daily  polyethylene glycol 3350 17 Gram(s) Oral daily PRN  senna 2 Tablet(s) Oral at bedtime  sodium chloride 0.9%. 1000 milliLiter(s) IV Continuous <Continuous>  thiamine IVPB 250 milliGRAM(s) IV Intermittent daily      Review of Systems:  Review of systems otherwise negative except as previously noted.    Allergies: No Known Allergies    For details regarding the patient's past medical history, social history, family history, and other miscellaneous elements, please refer the initial infectious diseases consultation and/or the admitting history and physical examination for this admission.    Objective:  Vitals:   T(C): 36.9 (04-06-22 @ 09:37), Max: 37.4 (04-06-22 @ 05:08)  HR: 97 (04-06-22 @ 09:37) (68 - 97)  BP: 139/81 (04-06-22 @ 09:37) (103/61 - 160/77)  RR: 18 (04-06-22 @ 09:37) (18 - 18)  SpO2: 95% (04-06-22 @ 09:37) (95% - 97%)    Physical Examination:  General: no acute distress  HEENT: NC/AT, EOMI, face appears flushed, dry skin around mouth  Cardio: S1, S2 heard, RRR, no murmurs  Resp: breath sounds heard bilaterally, no rales, wheezes or rhonchi  Abd: soft, NT, ND  Ext: no edema or cyanosis  Skin: warm, dry, no visible rash      Laboratory Studies:  CBC:   CMP:             Microbiology: reviewed    Culture - Blood (collected 04-03-22 @ 16:18)  Source: .Blood Blood  Preliminary Report (04-04-22 @ 17:01):    No growth to date.    Culture - Blood (collected 04-03-22 @ 16:18)  Source: .Blood Blood  Preliminary Report (04-04-22 @ 17:01):    No growth to date.    Culture - Urine (collected 04-01-22 @ 22:04)  Source: Clean Catch Clean Catch (Midstream)  Final Report (04-02-22 @ 19:11):    <10,000 CFU/mL Normal Urogenital Diane    Culture - Blood (collected 04-01-22 @ 22:04)  Source: .Blood Blood  Gram Stain (04-02-22 @ 17:23):    Growth in anaerobic bottle: Gram Positive Cocci in Clusters    Growth in aerobic bottle: Gram Positive Cocci in Clusters  Final Report (04-03-22 @ 17:37):    Growth in aerobic and anaerobic bottles: Staphylococcus capitis    Growth in anaerobic bottle: Staphylococcus epidermidis    Coag Negative Staphylococcus    Single set isolate, possible contaminant. Contact    Microbiology if susceptibility testing clinically    indicated.    ***Blood Panel PCR results on this specimen are available    approximately 3 hours after the Gram stain result.***    Gram stain, PCR, and/or culture results may not always    correspond due to difference in methodologies.    ************************************************************    This PCR assay was performed by multiplex PCR. This    Assay tests for 66 bacterial and resistance gene targets.    Please refer to the [facility] Labs test directory    at [email or web address] for details.  Organism: Blood Culture PCR (04-03-22 @ 17:37)  Organism: Blood Culture PCR (04-03-22 @ 17:37)      -  Staphylococcus epidermidis: Detec      Method Type: PCR    Culture - Blood (collected 04-01-22 @ 22:04)  Source: .Blood Blood  Preliminary Report (04-02-22 @ 23:01):    No growth to date.        Radiology: reviewed

## 2022-04-06 NOTE — PROGRESS NOTE ADULT - SUBJECTIVE AND OBJECTIVE BOX
Patient is a 72y old  Male who presents with a chief complaint of Found at the bottom of the stairs at home by spouse with patient intoxicated, with spouse also intoxicated per EMS (06 Apr 2022 12:39)      INTERVAL HPI/OVERNIGHT EVENTS: noted  pt seen and examined this am   events noted  shakiness, worried about wife who is hospitalized      Vital Signs Last 24 Hrs  T(C): 37.2 (06 Apr 2022 18:07), Max: 37.4 (06 Apr 2022 05:08)  T(F): 99 (06 Apr 2022 18:07), Max: 99.3 (06 Apr 2022 05:08)  HR: 93 (06 Apr 2022 18:07) (73 - 97)  BP: 127/75 (06 Apr 2022 18:07) (103/61 - 160/77)  BP(mean): --  RR: 18 (06 Apr 2022 18:07) (18 - 18)  SpO2: 96% (06 Apr 2022 18:07) (95% - 97%)    acetaminophen     Tablet .. 650 milliGRAM(s) Oral every 6 hours PRN  amLODIPine   Tablet 10 milliGRAM(s) Oral daily  dextrose 5%. 1000 milliLiter(s) IV Continuous <Continuous>  dextrose 5%. 1000 milliLiter(s) IV Continuous <Continuous>  dextrose 50% Injectable 25 Gram(s) IV Push once  dextrose 50% Injectable 12.5 Gram(s) IV Push once  dextrose 50% Injectable 25 Gram(s) IV Push once  dextrose Oral Gel 15 Gram(s) Oral once PRN  enoxaparin Injectable 40 milliGRAM(s) SubCutaneous every 24 hours  folic acid 1 milliGRAM(s) Oral daily  glucagon  Injectable 1 milliGRAM(s) IntraMuscular once  guaiFENesin Oral Liquid (Sugar-Free) 100 milliGRAM(s) Oral every 6 hours PRN  hydrALAZINE 25 milliGRAM(s) Oral every 8 hours  LORazepam     Tablet   Oral   LORazepam     Tablet 2 milliGRAM(s) Oral every 2 hours PRN  LORazepam     Tablet 0.5 milliGRAM(s) Oral every 12 hours  LORazepam   Injectable 2 milliGRAM(s) IV Push every 1 hour PRN  multivitamin 1 Tablet(s) Oral daily  polyethylene glycol 3350 17 Gram(s) Oral daily PRN  senna 2 Tablet(s) Oral at bedtime  sodium chloride 0.9%. 1000 milliLiter(s) IV Continuous <Continuous>  thiamine IVPB 250 milliGRAM(s) IV Intermittent daily      PHYSICAL EXAM:  GENERAL: NAD,   EYES: conjunctiva and sclera clear  ENMT: Moist mucous membranes  NECK: Supple, No JVD, Normal thyroid  CHEST/LUNG: non labored, cta b/l  HEART: Regular rate and rhythm; No murmurs, rubs, or gallops  ABDOMEN: Soft, Nontender, Nondistended; Bowel sounds present  EXTREMITIES:  2+ Peripheral Pulses, No clubbing, cyanosis, or edema  LYMPH: No lymphadenopathy noted  SKIN: No rashes or lesions    Consultant(s) Notes Reviewed:  [x ] YES  [ ] NO  Care Discussed with Consultants/Other Providers [ x] YES  [ ] NO    LABS:              CAPILLARY BLOOD GLUCOSE                  RADIOLOGY & ADDITIONAL TESTS:    Imaging Personally Reviewed:  [x ] YES  [ ] NO

## 2022-04-07 LAB — SARS-COV-2 RNA SPEC QL NAA+PROBE: SIGNIFICANT CHANGE UP

## 2022-04-07 RX ADMIN — Medication 102.5 MILLIGRAM(S): at 12:13

## 2022-04-07 RX ADMIN — Medication 0.5 MILLIGRAM(S): at 05:17

## 2022-04-07 RX ADMIN — Medication 25 MILLIGRAM(S): at 22:40

## 2022-04-07 RX ADMIN — Medication 1 TABLET(S): at 12:12

## 2022-04-07 RX ADMIN — Medication 25 MILLIGRAM(S): at 12:13

## 2022-04-07 RX ADMIN — Medication 1 MILLIGRAM(S): at 12:13

## 2022-04-07 RX ADMIN — Medication 25 MILLIGRAM(S): at 05:17

## 2022-04-07 RX ADMIN — Medication 2 MILLIGRAM(S): at 16:21

## 2022-04-07 RX ADMIN — Medication 2 MILLIGRAM(S): at 12:18

## 2022-04-07 RX ADMIN — AMLODIPINE BESYLATE 10 MILLIGRAM(S): 2.5 TABLET ORAL at 05:17

## 2022-04-07 RX ADMIN — SENNA PLUS 2 TABLET(S): 8.6 TABLET ORAL at 22:40

## 2022-04-07 RX ADMIN — ENOXAPARIN SODIUM 40 MILLIGRAM(S): 100 INJECTION SUBCUTANEOUS at 05:17

## 2022-04-07 NOTE — PROGRESS NOTE ADULT - SUBJECTIVE AND OBJECTIVE BOX
[facility], Division of Infectious Diseases  PIOTR Fields Y. Patel, S. Shah, G. [facility]  [phone]    Name: OVIDIO TOBIAS  Age: 72y  Gender: Male  MRN: [id number]    Interval History:  No acute overnight events. Afebrile  Worried about wife  Notes reviewed    Antibiotics:      Medications:  acetaminophen     Tablet .. 650 milliGRAM(s) Oral every 6 hours PRN  amLODIPine   Tablet 10 milliGRAM(s) Oral daily  dextrose 5%. 1000 milliLiter(s) IV Continuous <Continuous>  dextrose 5%. 1000 milliLiter(s) IV Continuous <Continuous>  dextrose 50% Injectable 25 Gram(s) IV Push once  dextrose 50% Injectable 12.5 Gram(s) IV Push once  dextrose 50% Injectable 25 Gram(s) IV Push once  dextrose Oral Gel 15 Gram(s) Oral once PRN  enoxaparin Injectable 40 milliGRAM(s) SubCutaneous every 24 hours  folic acid 1 milliGRAM(s) Oral daily  glucagon  Injectable 1 milliGRAM(s) IntraMuscular once  guaiFENesin Oral Liquid (Sugar-Free) 100 milliGRAM(s) Oral every 6 hours PRN  hydrALAZINE 25 milliGRAM(s) Oral every 8 hours  LORazepam     Tablet 2 milliGRAM(s) Oral every 2 hours PRN  LORazepam   Injectable 2 milliGRAM(s) IV Push every 1 hour PRN  multivitamin 1 Tablet(s) Oral daily  polyethylene glycol 3350 17 Gram(s) Oral daily PRN  senna 2 Tablet(s) Oral at bedtime  sodium chloride 0.9%. 1000 milliLiter(s) IV Continuous <Continuous>  thiamine IVPB 250 milliGRAM(s) IV Intermittent daily      Review of Systems:  Review of systems otherwise negative except as previously noted.    Allergies: No Known Allergies    For details regarding the patient's past medical history, social history, family history, and other miscellaneous elements, please refer the initial infectious diseases consultation and/or the admitting history and physical examination for this admission.    Objective:  Vitals:   T(C): 37 (04-07-22 @ 04:50), Max: 37.2 (04-06-22 @ 18:07)  HR: 83 (04-07-22 @ 04:50) (83 - 93)  BP: 113/68 (04-07-22 @ 04:50) (113/68 - 163/84)  RR: 18 (04-07-22 @ 04:50) (18 - 18)  SpO2: 94% (04-07-22 @ 04:50) (94% - 96%)    Physical Examination:  General: no acute distress  HEENT: NC/AT, EOMI, face appears flushed, dry skin around mouth  Cardio: S1, S2 heard, RRR, no murmurs  Resp: breath sounds heard bilaterally, no rales, wheezes or rhonchi  Abd: soft, NT, ND  Ext: no edema or cyanosis  Skin: warm, dry, no visible rash      Laboratory Studies:  CBC:   CMP:             Microbiology: reviewed    Culture - Blood (collected 04-03-22 @ 16:18)  Source: .Blood Blood  Preliminary Report (04-04-22 @ 17:01):    No growth to date.    Culture - Blood (collected 04-03-22 @ 16:18)  Source: .Blood Blood  Preliminary Report (04-04-22 @ 17:01):    No growth to date.    Culture - Urine (collected 04-01-22 @ 22:04)  Source: Clean Catch Clean Catch (Midstream)  Final Report (04-02-22 @ 19:11):    <10,000 CFU/mL Normal Urogenital Diane    Culture - Blood (collected 04-01-22 @ 22:04)  Source: .Blood Blood  Gram Stain (04-02-22 @ 17:23):    Growth in anaerobic bottle: Gram Positive Cocci in Clusters    Growth in aerobic bottle: Gram Positive Cocci in Clusters  Final Report (04-03-22 @ 17:37):    Growth in aerobic and anaerobic bottles: Staphylococcus capitis    Growth in anaerobic bottle: Staphylococcus epidermidis    Coag Negative Staphylococcus    Single set isolate, possible contaminant. Contact    Microbiology if susceptibility testing clinically    indicated.    ***Blood Panel PCR results on this specimen are available    approximately 3 hours after the Gram stain result.***    Gram stain, PCR, and/or culture results may not always    correspond due to difference in methodologies.    ************************************************************    This PCR assay was performed by multiplex PCR. This    Assay tests for 66 bacterial and resistance gene targets.    Please refer to the [facility] Labs test directory    at [email or web address] for details.  Organism: Blood Culture PCR (04-03-22 @ 17:37)  Organism: Blood Culture PCR (04-03-22 @ 17:37)      -  Staphylococcus epidermidis: Detec      Method Type: PCR    Culture - Blood (collected 04-01-22 @ 22:04)  Source: .Blood Blood  Final Report (04-06-22 @ 23:01):    No Growth Final        Radiology: reviewed

## 2022-04-07 NOTE — PROGRESS NOTE ADULT - ASSESSMENT
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     Fever--resolved  Alcohol abuse with withdrawal  Dehydration due to alcohol abuse   Thrombocytopenia likely due to above   Transaminitis in setting of above and hepatic steatosis    -infectious w/u reviewed/imaging reviewed    -- COVID PCR negative     -- CXR with no consolidation/infiltrates     -- CT C/A/P with no infectious source, noted with hepatic steatosis     -- 1/2 blood cultures with CoNS-Staph epi, repeat from 4/3 NGTD -- likely skin contaminant, would not treat at this time   - continue to monitor off antibiotics    - on CIWA protocol, management per primary team    - monitor temps/WBC    D/c planning per primary team when clinically stable    Infectious Diseases will continue to follow. Please call with any questions.   Katty Nguyen M.D.  [facility], Division of Infectious Diseases [phone]

## 2022-04-07 NOTE — PROGRESS NOTE ADULT - ASSESSMENT
73yo M PMHx etoh abuse with past ictal episodes and delirium, with admission to [place] in August 2021 with an extensive LEFT LE DVT, 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     # ETOH abuse  # hepatic steatosis  etoh cessation discussed, pt states he is interested in quitting  pt states he had a DUI couple months ago and was mandated by court to go to 30 day rehab program - [facility]  cont CIWA, Aivan taper  Abd US hepatic steatosis    # fever  afebrile  BC strep epi likely contaminant  repeat BC NTD, no abx, ID following    # hx extensive LLE DVT sp thrombectomy and bl PE august 2021  appreciate heme recs re AC  pt is a unreliable historian, he has not followed up with any doctor since previous admission  repeat doppler US neg for DVT  given high fall risk and unreliable follow up, will stop xarelto  fu hyper coag [place]    dvt ppx lovenox    dc when taper finished  SW to fu as pt high risk for relapse, safe dc plan- pt accepted to an inpt alc rehab prior to this admission- to [facility]  wife currently hospitalized    [facility] [phone]

## 2022-04-07 NOTE — PROGRESS NOTE ADULT - SUBJECTIVE AND OBJECTIVE BOX
Patient is a 72y old  Male who presents with a chief complaint of Found at the bottom of the stairs at home by spouse with patient intoxicated, with spouse also intoxicated per EMS (07 Apr 2022 11:36)      INTERVAL HPI/OVERNIGHT EVENTS: noted  pt seen and examined this am   events noted  feels well  ambulating steadily with PT      Vital Signs Last 24 Hrs  T(C): 37 (07 Apr 2022 17:53), Max: 37.1 (06 Apr 2022 21:15)  T(F): 98.6 (07 Apr 2022 17:53), Max: 98.7 (06 Apr 2022 21:15)  HR: 94 (07 Apr 2022 17:53) (83 - 99)  BP: 145/75 (07 Apr 2022 17:53) (113/68 - 163/84)  BP(mean): --  RR: 18 (07 Apr 2022 17:53) (18 - 18)  SpO2: 95% (07 Apr 2022 17:53) (94% - 98%)    acetaminophen     Tablet .. 650 milliGRAM(s) Oral every 6 hours PRN  amLODIPine   Tablet 10 milliGRAM(s) Oral daily  dextrose 5%. 1000 milliLiter(s) IV Continuous <Continuous>  dextrose 5%. 1000 milliLiter(s) IV Continuous <Continuous>  dextrose 50% Injectable 25 Gram(s) IV Push once  dextrose 50% Injectable 12.5 Gram(s) IV Push once  dextrose 50% Injectable 25 Gram(s) IV Push once  dextrose Oral Gel 15 Gram(s) Oral once PRN  enoxaparin Injectable 40 milliGRAM(s) SubCutaneous every 24 hours  folic acid 1 milliGRAM(s) Oral daily  glucagon  Injectable 1 milliGRAM(s) IntraMuscular once  guaiFENesin Oral Liquid (Sugar-Free) 100 milliGRAM(s) Oral every 6 hours PRN  hydrALAZINE 25 milliGRAM(s) Oral every 8 hours  LORazepam     Tablet 2 milliGRAM(s) Oral every 2 hours PRN  LORazepam   Injectable 2 milliGRAM(s) IV Push every 1 hour PRN  multivitamin 1 Tablet(s) Oral daily  polyethylene glycol 3350 17 Gram(s) Oral daily PRN  senna 2 Tablet(s) Oral at bedtime  sodium chloride 0.9%. 1000 milliLiter(s) IV Continuous <Continuous>      PHYSICAL EXAM:  GENERAL: NAD,   EYES: conjunctiva and sclera clear  ENMT: Moist mucous membranes  NECK: Supple, No JVD, Normal thyroid  CHEST/LUNG: non labored, cta b/l  HEART: Regular rate and rhythm; No murmurs, rubs, or gallops  ABDOMEN: Soft, Nontender, Nondistended; Bowel sounds present  EXTREMITIES:  2+ Peripheral Pulses, No clubbing, cyanosis, or edema  LYMPH: No lymphadenopathy noted  SKIN: No rashes or lesions    Consultant(s) Notes Reviewed:  [x ] YES  [ ] NO  Care Discussed with Consultants/Other Providers [ x] YES  [ ] NO    LABS:              CAPILLARY BLOOD GLUCOSE                  RADIOLOGY & ADDITIONAL TESTS:    Imaging Personally Reviewed:  [x ] YES  [ ] NO

## 2022-04-08 ENCOUNTER — TRANSCRIPTION ENCOUNTER (OUTPATIENT)
Age: 72
End: 2022-04-08

## 2022-04-08 LAB
ANION GAP SERPL CALC-SCNC: 17 MMOL/L — SIGNIFICANT CHANGE UP (ref 5–17)
BUN SERPL-MCNC: 11 MG/DL — SIGNIFICANT CHANGE UP (ref 7–23)
CALCIUM SERPL-MCNC: 9.3 MG/DL — SIGNIFICANT CHANGE UP (ref 8.4–10.5)
CHLORIDE SERPL-SCNC: 99 MMOL/L — SIGNIFICANT CHANGE UP (ref 96–108)
CO2 SERPL-SCNC: 18 MMOL/L — LOW (ref 22–31)
CREAT SERPL-MCNC: 0.78 MG/DL — SIGNIFICANT CHANGE UP (ref 0.5–1.3)
CULTURE RESULTS: SIGNIFICANT CHANGE UP
CULTURE RESULTS: SIGNIFICANT CHANGE UP
EGFR: 95 ML/MIN/1.73M2 — SIGNIFICANT CHANGE UP
GLUCOSE SERPL-MCNC: 105 MG/DL — HIGH (ref 70–99)
HCT VFR BLD CALC: 45.4 % — SIGNIFICANT CHANGE UP (ref 39–50)
HGB BLD-MCNC: 16.1 G/DL — SIGNIFICANT CHANGE UP (ref 13–17)
MCHC RBC-ENTMCNC: 34.8 PG — HIGH (ref 27–34)
MCHC RBC-ENTMCNC: 35.5 GM/DL — SIGNIFICANT CHANGE UP (ref 32–36)
MCV RBC AUTO: 98.1 FL — SIGNIFICANT CHANGE UP (ref 80–100)
NRBC # BLD: 0 /100 WBCS — SIGNIFICANT CHANGE UP (ref 0–0)
PLATELET # BLD AUTO: 125 K/UL — LOW (ref 150–400)
POTASSIUM SERPL-MCNC: 4.1 MMOL/L — SIGNIFICANT CHANGE UP (ref 3.5–5.3)
POTASSIUM SERPL-SCNC: 4.1 MMOL/L — SIGNIFICANT CHANGE UP (ref 3.5–5.3)
PROT S FREE PPP-ACNC: 82 % — SIGNIFICANT CHANGE UP (ref 63–140)
RBC # BLD: 4.63 M/UL — SIGNIFICANT CHANGE UP (ref 4.2–5.8)
RBC # FLD: 12.7 % — SIGNIFICANT CHANGE UP (ref 10.3–14.5)
SODIUM SERPL-SCNC: 134 MMOL/L — LOW (ref 135–145)
SPECIMEN SOURCE: SIGNIFICANT CHANGE UP
SPECIMEN SOURCE: SIGNIFICANT CHANGE UP
WBC # BLD: 4.87 K/UL — SIGNIFICANT CHANGE UP (ref 3.8–10.5)
WBC # FLD AUTO: 4.87 K/UL — SIGNIFICANT CHANGE UP (ref 3.8–10.5)

## 2022-04-08 PROCEDURE — G0452: CPT | Mod: 26

## 2022-04-08 RX ORDER — HYDRALAZINE HCL 50 MG
1 TABLET ORAL
Qty: 0 | Refills: 0 | DISCHARGE
Start: 2022-04-08

## 2022-04-08 RX ORDER — AMLODIPINE BESYLATE 2.5 MG/1
1 TABLET ORAL
Qty: 0 | Refills: 0 | DISCHARGE
Start: 2022-04-08

## 2022-04-08 RX ADMIN — Medication 25 MILLIGRAM(S): at 22:59

## 2022-04-08 RX ADMIN — SENNA PLUS 2 TABLET(S): 8.6 TABLET ORAL at 22:59

## 2022-04-08 RX ADMIN — Medication 1 MILLIGRAM(S): at 11:42

## 2022-04-08 RX ADMIN — Medication 2 MILLIGRAM(S): at 16:21

## 2022-04-08 RX ADMIN — AMLODIPINE BESYLATE 10 MILLIGRAM(S): 2.5 TABLET ORAL at 06:13

## 2022-04-08 RX ADMIN — ENOXAPARIN SODIUM 40 MILLIGRAM(S): 100 INJECTION SUBCUTANEOUS at 06:13

## 2022-04-08 RX ADMIN — Medication 1 TABLET(S): at 11:41

## 2022-04-08 RX ADMIN — Medication 2 MILLIGRAM(S): at 08:47

## 2022-04-08 RX ADMIN — Medication 25 MILLIGRAM(S): at 06:13

## 2022-04-08 RX ADMIN — Medication 25 MILLIGRAM(S): at 11:42

## 2022-04-08 NOTE — DISCHARGE NOTE NURSING/CASE MANAGEMENT/SOCIAL WORK - NSDCFUADDAPPT_GEN_ALL_CORE_FT
Scheduled to be discharged to [facility] on 4/9/22  Please follow up with all medical appointments

## 2022-04-08 NOTE — PROGRESS NOTE ADULT - SUBJECTIVE AND OBJECTIVE BOX
Patient is a 72y old  Male who presents with a chief complaint of Found at the bottom of the stairs at home by spouse with patient intoxicated, with spouse also intoxicated per EMS (08 Apr 2022 09:53)      INTERVAL HPI/OVERNIGHT EVENTS: noted  pt seen and examined this am   events noted  feels well      Vital Signs Last 24 Hrs  T(C): 37.7 (08 Apr 2022 12:08), Max: 37.7 (08 Apr 2022 12:08)  T(F): 99.8 (08 Apr 2022 12:08), Max: 99.8 (08 Apr 2022 12:08)  HR: 88 (08 Apr 2022 12:08) (79 - 96)  BP: 137/77 (08 Apr 2022 12:08) (116/61 - 147/74)  BP(mean): --  RR: 18 (08 Apr 2022 12:08) (18 - 18)  SpO2: 95% (08 Apr 2022 12:08) (94% - 98%)    acetaminophen     Tablet .. 650 milliGRAM(s) Oral every 6 hours PRN  amLODIPine   Tablet 10 milliGRAM(s) Oral daily  dextrose 5%. 1000 milliLiter(s) IV Continuous <Continuous>  dextrose 5%. 1000 milliLiter(s) IV Continuous <Continuous>  dextrose 50% Injectable 25 Gram(s) IV Push once  dextrose 50% Injectable 12.5 Gram(s) IV Push once  dextrose 50% Injectable 25 Gram(s) IV Push once  dextrose Oral Gel 15 Gram(s) Oral once PRN  enoxaparin Injectable 40 milliGRAM(s) SubCutaneous every 24 hours  folic acid 1 milliGRAM(s) Oral daily  glucagon  Injectable 1 milliGRAM(s) IntraMuscular once  guaiFENesin Oral Liquid (Sugar-Free) 100 milliGRAM(s) Oral every 6 hours PRN  hydrALAZINE 25 milliGRAM(s) Oral every 8 hours  LORazepam     Tablet 2 milliGRAM(s) Oral every 2 hours PRN  LORazepam   Injectable 2 milliGRAM(s) IV Push every 1 hour PRN  multivitamin 1 Tablet(s) Oral daily  polyethylene glycol 3350 17 Gram(s) Oral daily PRN  senna 2 Tablet(s) Oral at bedtime  sodium chloride 0.9%. 1000 milliLiter(s) IV Continuous <Continuous>      PHYSICAL EXAM:  GENERAL: NAD,   EYES: conjunctiva and sclera clear  ENMT: Moist mucous membranes  NECK: Supple, No JVD, Normal thyroid  CHEST/LUNG: non labored, cta b/l  HEART: Regular rate and rhythm; No murmurs, rubs, or gallops  ABDOMEN: Soft, Nontender, Nondistended; Bowel sounds present  EXTREMITIES:  2+ Peripheral Pulses, No clubbing, cyanosis, or edema  LYMPH: No lymphadenopathy noted  SKIN: No rashes or lesions    Consultant(s) Notes Reviewed:  [x ] YES  [ ] NO  Care Discussed with Consultants/Other Providers [ x] YES  [ ] NO    LABS:              CAPILLARY BLOOD GLUCOSE                  RADIOLOGY & ADDITIONAL TESTS:    Imaging Personally Reviewed:  [x ] YES  [ ] NO

## 2022-04-08 NOTE — DISCHARGE NOTE NURSING/CASE MANAGEMENT/SOCIAL WORK - NSDCVIVACCINE_GEN_ALL_CORE_FT
Tdap; 31-Mar-2022 18:34; Sue De La Fuente (RODY); [employer]; [id number] (Exp. Date: 18-Jan-2022); IntraMuscular; Deltoid Left.; 0.5 milliLiter(s); VIS (VIS Published: 09-May-2013, VIS Presented: 31-Mar-2022);

## 2022-04-08 NOTE — PROGRESS NOTE ADULT - ASSESSMENT
73yo M PMHx etoh abuse with past ictal episodes and delirium, with admission to [place] in August 2021 with an extensive LEFT LE DVT, 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     # ETOH abuse  # hepatic steatosis  etoh cessation discussed, pt states he is interested in quitting  pt states he had a DUI couple months ago and was mandated by court to go to 30 day rehab program -  fu  sp CIWA, Aivan taper  Abd US hepatic steatosis    #HTN- newly dx essential HTN  cont amlodipine and hydralazine for now  outpt mgmt    # fever  afebrile  BC strep epi likely contaminant  repeat BC NTD, no abx, ID following    # hx extensive LLE DVT sp thrombectomy and bl PE august 2021  appreciate heme recs re AC  pt is a unreliable historian, he has not followed up with any doctor since previous admission  repeat doppler US neg for DVT  given high fall risk and unreliable follow up, held xarelto  fu hyper coag linton- check gene studies- Factor V leidon and prothrombin gene mutation  stat cbc and BMP today    dvt ppx lovenox    dc when taper finished  SW to fu as pt high risk for relapse, safe dc plan- had a DUI couple months ago and was mandated by court to go to 30 day rehab program -   wife currently hospitalized    Please call [facility] with questions [phone]

## 2022-04-08 NOTE — PROGRESS NOTE ADULT - ASSESSMENT
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     Fever--resolved  Alcohol abuse with withdrawal  Dehydration due to alcohol abuse   Thrombocytopenia likely due to above   Transaminitis in setting of above and hepatic steatosis    -infectious w/u reviewed/imaging reviewed    -- COVID PCR negative     -- CXR with no consolidation/infiltrates     -- CT C/A/P with no infectious source, noted with hepatic steatosis     -- 1/2 blood cultures with CoNS-Staph epi, repeat from 4/3 NGTD -- likely skin contaminant, would not treat at this time   - continue to monitor off antibiotics    - on CIWA protocol, management per primary team    - monitor temps/WBC    D/c planning per primary team when clinically stable    Infectious Diseases will continue to follow. Please call with any questions.   Katty Nguyen M.D.  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     Fever--resolved  Alcohol abuse with withdrawal  Dehydration due to alcohol abuse   Thrombocytopenia likely due to above   Transaminitis in setting of above and hepatic steatosis    -infectious w/u reviewed/imaging reviewed    -- COVID PCR negative     -- CXR with no consolidation/infiltrates     -- CT C/A/P with no infectious source, noted with hepatic steatosis     -- 1/2 blood cultures with CoNS-Staph epi, repeat from 4/3 NGTD -- likely skin contaminant, would not treat at this time   - continue to monitor off antibiotics    - on CIWA protocol, management per primary team    - monitor temps/WBC    D/c planning per primary team when clinically stable    Infectious Diseases will sign off. Please call with any questions.   Katty Nguyen M.D.  [facility], Division of Infectious Diseases [phone]

## 2022-04-08 NOTE — DISCHARGE NOTE NURSING/CASE MANAGEMENT/SOCIAL WORK - PATIENT PORTAL LINK FT
You can access the FollowMyHealth Patient Portal offered by [facility] by registering at the following website: [email or web address]. By joining [employer]’s FollowMyHealth portal, you will also be able to view your health information using other applications (apps) compatible with our system.

## 2022-04-08 NOTE — DISCHARGE NOTE NURSING/CASE MANAGEMENT/SOCIAL WORK - NSDCPEFALRISK_GEN_ALL_CORE
For information on Fall & Injury Prevention, visit: [email or web address] OR  [email or web address] OR  https://www.cdc.gov/steadi/patient.html

## 2022-04-08 NOTE — CHART NOTE - NSCHARTNOTEFT_GEN_A_CORE
HEMATOLOGY FELLOW NOTE    Hematology was initially consulted for a hypercoagulable work-up in this patient with a history of VTE.     Hypercoagulable w/u thus far has been unrevealing:  -LA negative  -ATIII mildly low at 70  -Prot C/S negative  -Factor V Leiden and Prothrombin gene mutation were ordered but have not been collected despite calls to primary team and multiple orders placed    Noted that patient is deemed too high of a fall risk and is not currently on therapeutic AC. Agree with this plan.    Patient can follow HEMATOLOGY FELLOW NOTE    Hematology was initially consulted for a hypercoagulable work-up in this patient with a history of VTE.     Hypercoagulable w/u thus far has been unrevealing:  -LA negative  -ATIII mildly low at 70  -Prot C/S negative  -Factor V Leiden and Prothrombin gene mutation were ordered but have not been collected despite multiple attempts    Noted that patient is deemed too high of a fall risk and is not currently on therapeutic AC. Agree with this plan.    Patient can follow up with his PCP. Full hypercoagulable w/u can be completed with PCP (any genetic testing) if deemed appropriate. Patient can be referred to [facility] by PCP if he is deemed safe to be started on AC in the future.     Hematology will sign off. Please page or call with questions.     Diana Seo MD  Hematology/Oncology Fellow, PGY-5  Pager: [phone]  After 5pm and on weekends please page on-call fellow

## 2022-04-08 NOTE — PROGRESS NOTE ADULT - SUBJECTIVE AND OBJECTIVE BOX
[facility], Division of Infectious Diseases  PIOTR Fields Y. Patel, S. Shah, G. [facility]  [phone]    Name: OVIDIO TOBIAS  Age: 72y  Gender: Male  MRN: [id number]    Interval History:  Patient seen and examined at bedside this morning  No acute overnight events.   Notes reviewed    Antibiotics:      Medications:  acetaminophen     Tablet .. 650 milliGRAM(s) Oral every 6 hours PRN  amLODIPine   Tablet 10 milliGRAM(s) Oral daily  dextrose 5%. 1000 milliLiter(s) IV Continuous <Continuous>  dextrose 5%. 1000 milliLiter(s) IV Continuous <Continuous>  dextrose 50% Injectable 25 Gram(s) IV Push once  dextrose 50% Injectable 12.5 Gram(s) IV Push once  dextrose 50% Injectable 25 Gram(s) IV Push once  dextrose Oral Gel 15 Gram(s) Oral once PRN  enoxaparin Injectable 40 milliGRAM(s) SubCutaneous every 24 hours  folic acid 1 milliGRAM(s) Oral daily  glucagon  Injectable 1 milliGRAM(s) IntraMuscular once  guaiFENesin Oral Liquid (Sugar-Free) 100 milliGRAM(s) Oral every 6 hours PRN  hydrALAZINE 25 milliGRAM(s) Oral every 8 hours  LORazepam     Tablet 2 milliGRAM(s) Oral every 2 hours PRN  LORazepam   Injectable 2 milliGRAM(s) IV Push every 1 hour PRN  multivitamin 1 Tablet(s) Oral daily  polyethylene glycol 3350 17 Gram(s) Oral daily PRN  senna 2 Tablet(s) Oral at bedtime  sodium chloride 0.9%. 1000 milliLiter(s) IV Continuous <Continuous>      Review of Systems:  A 10-point review of systems was obtained.     Pertinent positives and negatives--  Constitutional: No fevers. No Chills. No Rigors.   Cardiovascular: No chest pain. No palpitations.  Respiratory: No shortness of breath. No cough.  Gastrointestinal: No nausea or vomiting. No diarrhea or constipation.   Psychiatric: Pleasant. Appropriate affect.    Review of systems otherwise negative except as previously noted.    Allergies: No Known Allergies    For details regarding the patient's past medical history, social history, family history, and other miscellaneous elements, please refer the initial infectious diseases consultation and/or the admitting history and physical examination for this admission.    Objective:  Vitals:   T(C): 37.3 (04-08-22 @ 06:09), Max: 37.5 (04-08-22 @ 00:13)  HR: 79 (04-08-22 @ 06:09) (79 - 96)  BP: 121/65 (04-08-22 @ 06:09) (116/61 - 147/74)  RR: 18 (04-08-22 @ 06:09) (18 - 18)  SpO2: 95% (04-08-22 @ 06:09) (94% - 98%)    Physical Examination:  General: no acute distress  HEENT: NC/AT, EOMI, anicteric, no oral lesions  Neck: supple, no palpable LAD  Cardio: S1, S2 heard, RRR, no murmurs  Resp: breath sounds heard bilaterally, no rales, wheezes or rhonchi  Abd: soft, NT, ND, + bowel sounds  Neuro: no obvious focal deficits  Ext: no edema or cyanosis  Skin: warm, dry, no visible rash      Laboratory Studies:  CBC:   CMP:             Microbiology: reviewed    Culture - Blood (collected 04-03-22 @ 16:18)  Source: .Blood Blood  Preliminary Report (04-04-22 @ 17:01):    No growth to date.    Culture - Blood (collected 04-03-22 @ 16:18)  Source: .Blood Blood  Preliminary Report (04-04-22 @ 17:01):    No growth to date.    Culture - Urine (collected 04-01-22 @ 22:04)  Source: Clean Catch Clean Catch (Midstream)  Final Report (04-02-22 @ 19:11):    <10,000 CFU/mL Normal Urogenital Diane    Culture - Blood (collected 04-01-22 @ 22:04)  Source: .Blood Blood  Gram Stain (04-02-22 @ 17:23):    Growth in anaerobic bottle: Gram Positive Cocci in Clusters    Growth in aerobic bottle: Gram Positive Cocci in Clusters  Final Report (04-03-22 @ 17:37):    Growth in aerobic and anaerobic bottles: Staphylococcus capitis    Growth in anaerobic bottle: Staphylococcus epidermidis    Coag Negative Staphylococcus    Single set isolate, possible contaminant. Contact    Microbiology if susceptibility testing clinically    indicated.    ***Blood Panel PCR results on this specimen are available    approximately 3 hours after the Gram stain result.***    Gram stain, PCR, and/or culture results may not always    correspond due to difference in methodologies.    ************************************************************    This PCR assay was performed by multiplex PCR. This    Assay tests for 66 bacterial and resistance gene targets.    Please refer to the [facility] test directory    at [email or web address] for details.  Organism: Blood Culture PCR (04-03-22 @ 17:37)  Organism: Blood Culture PCR (04-03-22 @ 17:37)      -  Staphylococcus epidermidis: Detec      Method Type: PCR    Culture - Blood (collected 04-01-22 @ 22:04)  Source: .Blood Blood  Final Report (04-06-22 @ 23:01):    No Growth Final        Radiology: reviewed       [facility], Division of Infectious Diseases  PIOTR Fields Y. Patel, S. Shah, G. [facility]  [phone]    Name: OVIDIO TOBIAS  Age: 72y  Gender: Male  MRN: [id number]    Interval History:  Patient seen and examined at bedside this morning  No acute overnight events. Afebrile  Feeling ok  Notes reviewed    Antibiotics:      Medications:  acetaminophen     Tablet .. 650 milliGRAM(s) Oral every 6 hours PRN  amLODIPine   Tablet 10 milliGRAM(s) Oral daily  dextrose 5%. 1000 milliLiter(s) IV Continuous <Continuous>  dextrose 5%. 1000 milliLiter(s) IV Continuous <Continuous>  dextrose 50% Injectable 25 Gram(s) IV Push once  dextrose 50% Injectable 12.5 Gram(s) IV Push once  dextrose 50% Injectable 25 Gram(s) IV Push once  dextrose Oral Gel 15 Gram(s) Oral once PRN  enoxaparin Injectable 40 milliGRAM(s) SubCutaneous every 24 hours  folic acid 1 milliGRAM(s) Oral daily  glucagon  Injectable 1 milliGRAM(s) IntraMuscular once  guaiFENesin Oral Liquid (Sugar-Free) 100 milliGRAM(s) Oral every 6 hours PRN  hydrALAZINE 25 milliGRAM(s) Oral every 8 hours  LORazepam     Tablet 2 milliGRAM(s) Oral every 2 hours PRN  LORazepam   Injectable 2 milliGRAM(s) IV Push every 1 hour PRN  multivitamin 1 Tablet(s) Oral daily  polyethylene glycol 3350 17 Gram(s) Oral daily PRN  senna 2 Tablet(s) Oral at bedtime  sodium chloride 0.9%. 1000 milliLiter(s) IV Continuous <Continuous>      Review of Systems:  A 10-point review of systems was obtained.     Pertinent positives and negatives--  Constitutional: No fevers. No Chills. No Rigors.   Cardiovascular: No chest pain. No palpitations.  Respiratory: No shortness of breath. No cough.  Gastrointestinal: No nausea or vomiting. No diarrhea or constipation.   Psychiatric: Pleasant. Appropriate affect.    Review of systems otherwise negative except as previously noted.    Allergies: No Known Allergies    For details regarding the patient's past medical history, social history, family history, and other miscellaneous elements, please refer the initial infectious diseases consultation and/or the admitting history and physical examination for this admission.    Objective:  Vitals:   T(C): 37.3 (04-08-22 @ 06:09), Max: 37.5 (04-08-22 @ 00:13)  HR: 79 (04-08-22 @ 06:09) (79 - 96)  BP: 121/65 (04-08-22 @ 06:09) (116/61 - 147/74)  RR: 18 (04-08-22 @ 06:09) (18 - 18)  SpO2: 95% (04-08-22 @ 06:09) (94% - 98%)    Physical Examination:  General: no acute distress  HEENT: NC/AT, EOMI  Cardio: S1, S2 heard, RRR, no murmurs  Resp: breath sounds heard bilaterally, no rales, wheezes or rhonchi  Abd: soft, NT, ND  Ext: no edema or cyanosis  Skin: warm, dry, no visible rash      Laboratory Studies:  CBC:   CMP:             Microbiology: reviewed    Culture - Blood (collected 04-03-22 @ 16:18)  Source: .Blood Blood  Preliminary Report (04-04-22 @ 17:01):    No growth to date.    Culture - Blood (collected 04-03-22 @ 16:18)  Source: .Blood Blood  Preliminary Report (04-04-22 @ 17:01):    No growth to date.    Culture - Urine (collected 04-01-22 @ 22:04)  Source: Clean Catch Clean Catch (Midstream)  Final Report (04-02-22 @ 19:11):    <10,000 CFU/mL Normal Urogenital Diane    Culture - Blood (collected 04-01-22 @ 22:04)  Source: .Blood Blood  Gram Stain (04-02-22 @ 17:23):    Growth in anaerobic bottle: Gram Positive Cocci in Clusters    Growth in aerobic bottle: Gram Positive Cocci in Clusters  Final Report (04-03-22 @ 17:37):    Growth in aerobic and anaerobic bottles: Staphylococcus capitis    Growth in anaerobic bottle: Staphylococcus epidermidis    Coag Negative Staphylococcus    Single set isolate, possible contaminant. Contact    Microbiology if susceptibility testing clinically    indicated.    ***Blood Panel PCR results on this specimen are available    approximately 3 hours after the Gram stain result.***    Gram stain, PCR, and/or culture results may not always    correspond due to difference in methodologies.    ************************************************************    This PCR assay was performed by multiplex PCR. This    Assay tests for 66 bacterial and resistance gene targets.    Please refer to the [facility] Labs test directory    at [email or web address] for details.  Organism: Blood Culture PCR (04-03-22 @ 17:37)  Organism: Blood Culture PCR (04-03-22 @ 17:37)      -  Staphylococcus epidermidis: Detec      Method Type: PCR    Culture - Blood (collected 04-01-22 @ 22:04)  Source: .Blood Blood  Final Report (04-06-22 @ 23:01):    No Growth Final        Radiology: reviewed

## 2022-04-09 RX ORDER — ALPRAZOLAM 0.25 MG
0.5 TABLET ORAL
Refills: 0 | Status: DISCONTINUED | OUTPATIENT
Start: 2022-04-09 | End: 2022-04-11

## 2022-04-09 RX ADMIN — Medication 25 MILLIGRAM(S): at 21:54

## 2022-04-09 RX ADMIN — Medication 25 MILLIGRAM(S): at 12:37

## 2022-04-09 RX ADMIN — AMLODIPINE BESYLATE 10 MILLIGRAM(S): 2.5 TABLET ORAL at 06:06

## 2022-04-09 RX ADMIN — ENOXAPARIN SODIUM 40 MILLIGRAM(S): 100 INJECTION SUBCUTANEOUS at 06:05

## 2022-04-09 RX ADMIN — Medication 1 TABLET(S): at 12:37

## 2022-04-09 RX ADMIN — Medication 1 MILLIGRAM(S): at 08:25

## 2022-04-09 RX ADMIN — SENNA PLUS 2 TABLET(S): 8.6 TABLET ORAL at 21:53

## 2022-04-09 RX ADMIN — Medication 25 MILLIGRAM(S): at 06:06

## 2022-04-09 RX ADMIN — Medication 1 MILLIGRAM(S): at 12:37

## 2022-04-09 NOTE — CHART NOTE - NSCHARTNOTEFT_GEN_A_CORE
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 Patient with ETOH withdrawal, completed ativan taper. Patient stated feeling anxious and states he takes xanax at home. ISTOP revealed patient prescribe xanax 1mg TID. Concern for benzo withdrawal, will start xanax 0.5mg PO BID PRN for anxiety.  Discussed above with Dr Kevin and in agreement.        Courtney VICTORIAP-c

## 2022-04-09 NOTE — PROVIDER CONTACT NOTE (OTHER) - RECOMMENDATIONS
Robitussin
Notify NP.
tylenol
RN escalated to covering NP Linda & communicated in full the [facility], as well as the need to forward information requested ASAP.
continue to monitor and provider notfication
continue to monitor and provider notification

## 2022-04-09 NOTE — PROVIDER CONTACT NOTE (OTHER) - ASSESSMENT
pt Alertxanxious at times ,tremors  w/ciwa 4 denies any Sob or chest pain noted
pt Alertxanxious at times denies any Sob or Chest pain ciwa maintained q4hrs as 3
no distress, resting
AOx4, vs stable
Pt AOx3, sleeping in between care. VSS, neurological status stable. Pt most recent CIWA score = 2
Pt is A&Ox3, pt is asking for ativan due to tremors. Pt current CIWA score of 4 at 3am assessment, which decreased two points from 2am assessment. Pt HR slightly over 100BPM, and SBP at 3am was 170.

## 2022-04-09 NOTE — PROVIDER CONTACT NOTE (OTHER) - REASON
Pt is asking for ativan
pt had temp of 100.6
sbp 173/89 heart rate 100
sbp 187/99 heart rate 93
Pt has distended abdomen, coughing frequently
Request from inpatient rehab regarding additional info needed for pt discharge

## 2022-04-09 NOTE — PROVIDER CONTACT NOTE (OTHER) - ACTION/TREATMENT ORDERED:
As per Linda, team will follow up in AM, & RN will handoff notes from phone conversation with Jaylin to dayshift RN Brad.
monitor blood pressure after an hour
no new orders NP made aware
NP notified. NP stated she will order a one time dose of .5 ativan, and to continue with ativan taper dose.
Robitussin ordered
Tylenol ordered and given

## 2022-04-09 NOTE — PROGRESS NOTE ADULT - ASSESSMENT
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     # ETOH abuse  # hepatic steatosis  etoh cessation discussed, pt states he is interested in quitting  pt states he had a DUI couple months ago and was mandated by court to go to 30 day rehab program -  fu  sp CIWA, Aivan taper  Abd US hepatic steatosis    #HTN- newly dx essential HTN  cont amlodipine and hydralazine for now  outpt mgmt    # fever  afebrile  BC strep epi likely contaminant  repeat BC NTD, no abx, ID following    # hx extensive LLE DVT sp thrombectomy and bl PE august 2021  appreciate heme recs re AC  pt is a unreliable historian, he has not followed up with any doctor since previous admission  repeat doppler US neg for DVT  given high fall risk and unreliable follow up, held xarelto  fu hyper coag linton- check gene studies- Factor V leidon and prothrombin gene mutation  stat cbc and BMP today    dvt ppx lovenox    dc when taper finished  SW to fu as pt high risk for relapse, safe dc plan- had a DUI couple months ago and was mandated by court to go to 30 day rehab program -   wife currently hospitalized    Please call [facility] with questions [phone]

## 2022-04-09 NOTE — PROGRESS NOTE ADULT - SUBJECTIVE AND OBJECTIVE BOX
Patient is a 72y old  Male who presents with a chief complaint of Found at the bottom of the stairs at home by spouse with patient intoxicated, with spouse also intoxicated per EMS (08 Apr 2022 13:13)      INTERVAL HPI/OVERNIGHT EVENTS: noted  pt seen and examined this am   events noted  feels well      Vital Signs Last 24 Hrs  T(C): 36.9 (09 Apr 2022 12:50), Max: 37.1 (09 Apr 2022 04:46)  T(F): 98.5 (09 Apr 2022 12:50), Max: 98.7 (09 Apr 2022 04:46)  HR: 88 (09 Apr 2022 12:50) (84 - 88)  BP: 135/78 (09 Apr 2022 12:50) (112/56 - 148/73)  BP(mean): --  RR: 18 (09 Apr 2022 12:50) (18 - 18)  SpO2: 97% (09 Apr 2022 12:50) (94% - 97%)    acetaminophen     Tablet .. 650 milliGRAM(s) Oral every 6 hours PRN  ALPRAZolam 0.5 milliGRAM(s) Oral two times a day PRN  amLODIPine   Tablet 10 milliGRAM(s) Oral daily  dextrose 5%. 1000 milliLiter(s) IV Continuous <Continuous>  dextrose 5%. 1000 milliLiter(s) IV Continuous <Continuous>  dextrose 50% Injectable 25 Gram(s) IV Push once  dextrose 50% Injectable 12.5 Gram(s) IV Push once  dextrose 50% Injectable 25 Gram(s) IV Push once  dextrose Oral Gel 15 Gram(s) Oral once PRN  enoxaparin Injectable 40 milliGRAM(s) SubCutaneous every 24 hours  folic acid 1 milliGRAM(s) Oral daily  glucagon  Injectable 1 milliGRAM(s) IntraMuscular once  guaiFENesin Oral Liquid (Sugar-Free) 100 milliGRAM(s) Oral every 6 hours PRN  hydrALAZINE 25 milliGRAM(s) Oral every 8 hours  multivitamin 1 Tablet(s) Oral daily  polyethylene glycol 3350 17 Gram(s) Oral daily PRN  senna 2 Tablet(s) Oral at bedtime  sodium chloride 0.9%. 1000 milliLiter(s) IV Continuous <Continuous>      PHYSICAL EXAM:  GENERAL: NAD,   EYES: conjunctiva and sclera clear  ENMT: Moist mucous membranes  NECK: Supple, No JVD, Normal thyroid  CHEST/LUNG: non labored, cta b/l  HEART: Regular rate and rhythm; No murmurs, rubs, or gallops  ABDOMEN: Soft, Nontender, Nondistended; Bowel sounds present  EXTREMITIES:  2+ Peripheral Pulses, No clubbing, cyanosis, or edema  LYMPH: No lymphadenopathy noted  SKIN: No rashes or lesions    Consultant(s) Notes Reviewed:  [x ] YES  [ ] NO  Care Discussed with Consultants/Other Providers [ x] YES  [ ] NO    LABS:                        16.1   4.87  )-----------( 125      ( 08 Apr 2022 13:38 )             45.4     04-08    134<L>  |  99  |  11  ----------------------------<  105<H>  4.1   |  18<L>  |  0.78    Ca    9.3      08 Apr 2022 13:38          CAPILLARY BLOOD GLUCOSE                  RADIOLOGY & ADDITIONAL TESTS:    Imaging Personally Reviewed:  [x ] YES  [ ] NO

## 2022-04-09 NOTE — PROVIDER CONTACT NOTE (OTHER) - SITUATION
Jaylin from [facility] called to notify pt being denied placement unless rehab can be told why pt taken off Xarelto/ what is the Tx for blood clot & has pt been seen by Neuro for short-term memory loss
Pt has distended abdomen, coughing frequently, requesting robitussin
pt is ciwa and confused on !:!
Pt is asking for ativan
sbp 173/89 heart rate 100..ciwa 4 ,no c/o Sob or chest pain noted
sbp 187/99 heart rate 93 ,pt received Amlodipine 5mg po and lorazepam 1mg po

## 2022-04-09 NOTE — PROVIDER CONTACT NOTE (OTHER) - DATE AND TIME:
04-Apr-2022 18:25
02-Apr-2022 20:30
03-Apr-2022 05:30
08-Apr-2022 20:07
31-Mar-2022 20:00
01-Apr-2022 03:44

## 2022-04-09 NOTE — PROVIDER CONTACT NOTE (OTHER) - BACKGROUND
Pt admit dx of alcohol use with intoxication. Pt was planned for discharge to [facility] inpatient rehab for 4/9.
admitted w/ETOH intoxication w/ativan taper
admitted w/alchohol intoxication w/ativan tapering
Alcohol withdrawal
Dx: s/p mechanical fall
pt had a temp prior to this

## 2022-04-10 PROCEDURE — 93010 ELECTROCARDIOGRAM REPORT: CPT

## 2022-04-10 RX ADMIN — Medication 1 TABLET(S): at 11:26

## 2022-04-10 RX ADMIN — Medication 0.5 MILLIGRAM(S): at 21:22

## 2022-04-10 RX ADMIN — Medication 0.5 MILLIGRAM(S): at 11:26

## 2022-04-10 RX ADMIN — SENNA PLUS 2 TABLET(S): 8.6 TABLET ORAL at 21:22

## 2022-04-10 RX ADMIN — Medication 25 MILLIGRAM(S): at 14:28

## 2022-04-10 RX ADMIN — Medication 25 MILLIGRAM(S): at 21:22

## 2022-04-10 RX ADMIN — Medication 1 MILLIGRAM(S): at 11:26

## 2022-04-10 RX ADMIN — Medication 25 MILLIGRAM(S): at 06:05

## 2022-04-10 RX ADMIN — ENOXAPARIN SODIUM 40 MILLIGRAM(S): 100 INJECTION SUBCUTANEOUS at 06:05

## 2022-04-10 RX ADMIN — AMLODIPINE BESYLATE 10 MILLIGRAM(S): 2.5 TABLET ORAL at 06:07

## 2022-04-10 NOTE — PROGRESS NOTE ADULT - SUBJECTIVE AND OBJECTIVE BOX
Patient is a 72y old  Male who presents with a chief complaint of Found at the bottom of the stairs at home by spouse with patient intoxicated, with spouse also intoxicated per EMS (09 Apr 2022 11:47)      INTERVAL HPI/OVERNIGHT EVENTS: noted  pt seen and examined this am   events noted  no new complaints      Vital Signs Last 24 Hrs  T(C): 36.7 (10 Apr 2022 14:25), Max: 36.9 (09 Apr 2022 21:19)  T(F): 98.1 (10 Apr 2022 14:25), Max: 98.5 (09 Apr 2022 21:19)  HR: 85 (10 Apr 2022 14:25) (80 - 90)  BP: 146/77 (10 Apr 2022 14:25) (118/62 - 150/76)  BP(mean): --  RR: 18 (10 Apr 2022 14:25) (18 - 18)  SpO2: 95% (10 Apr 2022 14:25) (93% - 96%)    acetaminophen     Tablet .. 650 milliGRAM(s) Oral every 6 hours PRN  ALPRAZolam 0.5 milliGRAM(s) Oral two times a day PRN  amLODIPine   Tablet 10 milliGRAM(s) Oral daily  dextrose 5%. 1000 milliLiter(s) IV Continuous <Continuous>  dextrose 5%. 1000 milliLiter(s) IV Continuous <Continuous>  dextrose 50% Injectable 25 Gram(s) IV Push once  dextrose 50% Injectable 12.5 Gram(s) IV Push once  dextrose 50% Injectable 25 Gram(s) IV Push once  dextrose Oral Gel 15 Gram(s) Oral once PRN  enoxaparin Injectable 40 milliGRAM(s) SubCutaneous every 24 hours  folic acid 1 milliGRAM(s) Oral daily  glucagon  Injectable 1 milliGRAM(s) IntraMuscular once  guaiFENesin Oral Liquid (Sugar-Free) 100 milliGRAM(s) Oral every 6 hours PRN  hydrALAZINE 25 milliGRAM(s) Oral every 8 hours  multivitamin 1 Tablet(s) Oral daily  polyethylene glycol 3350 17 Gram(s) Oral daily PRN  senna 2 Tablet(s) Oral at bedtime  sodium chloride 0.9%. 1000 milliLiter(s) IV Continuous <Continuous>      PHYSICAL EXAM:  GENERAL: NAD,   EYES: conjunctiva and sclera clear  ENMT: Moist mucous membranes  NECK: Supple, No JVD, Normal thyroid  CHEST/LUNG: non labored, cta b/l  HEART: Regular rate and rhythm; No murmurs, rubs, or gallops  ABDOMEN: Soft, Nontender, Nondistended; Bowel sounds present  EXTREMITIES:  2+ Peripheral Pulses, No clubbing, cyanosis, or edema  LYMPH: No lymphadenopathy noted  SKIN: No rashes or lesions    Consultant(s) Notes Reviewed:  [x ] YES  [ ] NO  Care Discussed with Consultants/Other Providers [ x] YES  [ ] NO    LABS:              CAPILLARY BLOOD GLUCOSE                  RADIOLOGY & ADDITIONAL TESTS:    Imaging Personally Reviewed:  [x ] YES  [ ] NO

## 2022-04-10 NOTE — PROGRESS NOTE ADULT - ASSESSMENT
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     # ETOH abuse  # hepatic steatosis  etoh cessation discussed, pt states he is interested in quitting  pt states he had a DUI couple months ago and was mandated by court to go to 30 day rehab program - [facility]  sp CIWA, Aivan taper  Abd US hepatic steatosis    #Anxiety- prn xanax- home med    #HTN- newly dx essential HTN  cont amlodipine and hydralazine for now  outpt mgmt    # fever  afebrile  BC strep epi likely contaminant  repeat BC NTD, no abx, ID following    # hx extensive LLE DVT sp thrombectomy and bl PE august 2021  appreciate heme recs re AC  pt is a unreliable historian, he has not followed up with any doctor since previous admission  repeat doppler US neg for DVT  given high fall risk and unreliable follow up, held xarelto  fu hyper coag linton- check gene studies- Factor V leidon and prothrombin gene mutation  stat cbc and BMP today    dvt ppx lovenox    dc when taper finished  SW to fu as pt high risk for relapse, safe dc plan- had a DUI couple months ago and was mandated by court to go to 30 day rehab program -   wife currently hospitalized  awaiting rehab plecemnt    [facility] [phone]

## 2022-04-11 ENCOUNTER — TRANSCRIPTION ENCOUNTER (OUTPATIENT)
Age: 72
End: 2022-04-11

## 2022-04-11 VITALS
HEART RATE: 94 BPM | TEMPERATURE: 98 F | DIASTOLIC BLOOD PRESSURE: 75 MMHG | RESPIRATION RATE: 18 BRPM | SYSTOLIC BLOOD PRESSURE: 141 MMHG | OXYGEN SATURATION: 96 %

## 2022-04-11 LAB — SARS-COV-2 RNA SPEC QL NAA+PROBE: SIGNIFICANT CHANGE UP

## 2022-04-11 PROCEDURE — 81241 F5 GENE: CPT

## 2022-04-11 PROCEDURE — 82962 GLUCOSE BLOOD TEST: CPT

## 2022-04-11 PROCEDURE — 82803 BLOOD GASES ANY COMBINATION: CPT

## 2022-04-11 PROCEDURE — 82947 ASSAY GLUCOSE BLOOD QUANT: CPT

## 2022-04-11 PROCEDURE — 94640 AIRWAY INHALATION TREATMENT: CPT

## 2022-04-11 PROCEDURE — 87086 URINE CULTURE/COLONY COUNT: CPT

## 2022-04-11 PROCEDURE — 85025 COMPLETE CBC W/AUTO DIFF WBC: CPT

## 2022-04-11 PROCEDURE — 82435 ASSAY OF BLOOD CHLORIDE: CPT

## 2022-04-11 PROCEDURE — 76700 US EXAM ABDOM COMPLETE: CPT

## 2022-04-11 PROCEDURE — 84484 ASSAY OF TROPONIN QUANT: CPT

## 2022-04-11 PROCEDURE — 93306 TTE W/DOPPLER COMPLETE: CPT

## 2022-04-11 PROCEDURE — U0003: CPT

## 2022-04-11 PROCEDURE — 82140 ASSAY OF AMMONIA: CPT

## 2022-04-11 PROCEDURE — 85014 HEMATOCRIT: CPT

## 2022-04-11 PROCEDURE — 72125 CT NECK SPINE W/O DYE: CPT | Mod: MA

## 2022-04-11 PROCEDURE — 82746 ASSAY OF FOLIC ACID SERUM: CPT

## 2022-04-11 PROCEDURE — 97530 THERAPEUTIC ACTIVITIES: CPT

## 2022-04-11 PROCEDURE — 80053 COMPREHEN METABOLIC PANEL: CPT

## 2022-04-11 PROCEDURE — 81240 F2 GENE: CPT

## 2022-04-11 PROCEDURE — 80048 BASIC METABOLIC PNL TOTAL CA: CPT

## 2022-04-11 PROCEDURE — 82330 ASSAY OF CALCIUM: CPT

## 2022-04-11 PROCEDURE — 87077 CULTURE AEROBIC IDENTIFY: CPT

## 2022-04-11 PROCEDURE — 80076 HEPATIC FUNCTION PANEL: CPT

## 2022-04-11 PROCEDURE — 70450 CT HEAD/BRAIN W/O DYE: CPT | Mod: MA

## 2022-04-11 PROCEDURE — 96374 THER/PROPH/DIAG INJ IV PUSH: CPT | Mod: XU

## 2022-04-11 PROCEDURE — 36415 COLL VENOUS BLD VENIPUNCTURE: CPT

## 2022-04-11 PROCEDURE — 87150 DNA/RNA AMPLIFIED PROBE: CPT

## 2022-04-11 PROCEDURE — 97110 THERAPEUTIC EXERCISES: CPT

## 2022-04-11 PROCEDURE — 97161 PT EVAL LOW COMPLEX 20 MIN: CPT

## 2022-04-11 PROCEDURE — 85306 CLOT INHIBIT PROT S FREE: CPT

## 2022-04-11 PROCEDURE — 81001 URINALYSIS AUTO W/SCOPE: CPT

## 2022-04-11 PROCEDURE — 90471 IMMUNIZATION ADMIN: CPT

## 2022-04-11 PROCEDURE — 80307 DRUG TEST PRSMV CHEM ANLYZR: CPT

## 2022-04-11 PROCEDURE — 85303 CLOT INHIBIT PROT C ACTIVITY: CPT

## 2022-04-11 PROCEDURE — 99285 EMERGENCY DEPT VISIT HI MDM: CPT

## 2022-04-11 PROCEDURE — 71045 X-RAY EXAM CHEST 1 VIEW: CPT

## 2022-04-11 PROCEDURE — 96375 TX/PRO/DX INJ NEW DRUG ADDON: CPT

## 2022-04-11 PROCEDURE — 72170 X-RAY EXAM OF PELVIS: CPT

## 2022-04-11 PROCEDURE — 87635 SARS-COV-2 COVID-19 AMP PRB: CPT

## 2022-04-11 PROCEDURE — 85610 PROTHROMBIN TIME: CPT

## 2022-04-11 PROCEDURE — 87040 BLOOD CULTURE FOR BACTERIA: CPT

## 2022-04-11 PROCEDURE — 93005 ELECTROCARDIOGRAM TRACING: CPT

## 2022-04-11 PROCEDURE — 82565 ASSAY OF CREATININE: CPT

## 2022-04-11 PROCEDURE — 85301 ANTITHROMBIN III ANTIGEN: CPT

## 2022-04-11 PROCEDURE — 83036 HEMOGLOBIN GLYCOSYLATED A1C: CPT

## 2022-04-11 PROCEDURE — 82607 VITAMIN B-12: CPT

## 2022-04-11 PROCEDURE — 84145 PROCALCITONIN (PCT): CPT

## 2022-04-11 PROCEDURE — 90715 TDAP VACCINE 7 YRS/> IM: CPT

## 2022-04-11 PROCEDURE — 83735 ASSAY OF MAGNESIUM: CPT

## 2022-04-11 PROCEDURE — 83605 ASSAY OF LACTIC ACID: CPT

## 2022-04-11 PROCEDURE — 85018 HEMOGLOBIN: CPT

## 2022-04-11 PROCEDURE — 74177 CT ABD & PELVIS W/CONTRAST: CPT | Mod: MA

## 2022-04-11 PROCEDURE — 84100 ASSAY OF PHOSPHORUS: CPT

## 2022-04-11 PROCEDURE — 82550 ASSAY OF CK (CPK): CPT

## 2022-04-11 PROCEDURE — 85300 ANTITHROMBIN III ACTIVITY: CPT

## 2022-04-11 PROCEDURE — 84295 ASSAY OF SERUM SODIUM: CPT

## 2022-04-11 PROCEDURE — 85027 COMPLETE CBC AUTOMATED: CPT

## 2022-04-11 PROCEDURE — U0005: CPT

## 2022-04-11 PROCEDURE — 86803 HEPATITIS C AB TEST: CPT

## 2022-04-11 PROCEDURE — 93970 EXTREMITY STUDY: CPT

## 2022-04-11 PROCEDURE — 97116 GAIT TRAINING THERAPY: CPT

## 2022-04-11 PROCEDURE — 71260 CT THORAX DX C+: CPT | Mod: MA

## 2022-04-11 PROCEDURE — 84132 ASSAY OF SERUM POTASSIUM: CPT

## 2022-04-11 PROCEDURE — 85730 THROMBOPLASTIN TIME PARTIAL: CPT

## 2022-04-11 RX ORDER — AMLODIPINE BESYLATE 2.5 MG/1
1 TABLET ORAL
Qty: 30 | Refills: 0
Start: 2022-04-11 | End: 2022-05-10

## 2022-04-11 RX ORDER — HYDRALAZINE HCL 50 MG
1 TABLET ORAL
Qty: 90 | Refills: 0
Start: 2022-04-11 | End: 2022-05-10

## 2022-04-11 RX ORDER — ALPRAZOLAM 0.25 MG
0.5 TABLET ORAL
Qty: 0 | Refills: 0 | DISCHARGE

## 2022-04-11 RX ADMIN — ENOXAPARIN SODIUM 40 MILLIGRAM(S): 100 INJECTION SUBCUTANEOUS at 06:03

## 2022-04-11 RX ADMIN — Medication 25 MILLIGRAM(S): at 13:10

## 2022-04-11 RX ADMIN — Medication 1 MILLIGRAM(S): at 11:00

## 2022-04-11 RX ADMIN — Medication 25 MILLIGRAM(S): at 06:03

## 2022-04-11 RX ADMIN — Medication 0.5 MILLIGRAM(S): at 10:35

## 2022-04-11 RX ADMIN — Medication 100 MILLIGRAM(S): at 11:20

## 2022-04-11 RX ADMIN — Medication 1 TABLET(S): at 11:00

## 2022-04-11 RX ADMIN — SODIUM CHLORIDE 60 MILLILITER(S): 9 INJECTION INTRAMUSCULAR; INTRAVENOUS; SUBCUTANEOUS at 07:24

## 2022-04-11 RX ADMIN — AMLODIPINE BESYLATE 10 MILLIGRAM(S): 2.5 TABLET ORAL at 06:03

## 2022-04-11 NOTE — PROGRESS NOTE ADULT - PROVIDER SPECIALTY LIST ADULT
Infectious Disease
Infectious Disease
Internal Medicine
Infectious Disease
Internal Medicine
Infectious Disease
Internal Medicine

## 2022-04-11 NOTE — DISCHARGE NOTE PROVIDER - NSDCCPCAREPLAN_GEN_ALL_CORE_FT
PRINCIPAL DISCHARGE DIAGNOSIS  Diagnosis: Alcohol intoxication  Assessment and Plan of Treatment: You are being discharged to an inpatient alcohol rehab      SECONDARY DISCHARGE DIAGNOSES  Diagnosis: Fall down stairs  Assessment and Plan of Treatment: You must follow up with your primary medical doctor upon discharge from rehab.    Diagnosis: Alcohol withdrawal  Assessment and Plan of Treatment: You must abstain from alcohol usage    Diagnosis: Hypertension  Assessment and Plan of Treatment: Low salt diet  Activity as tolerated.  Take all medication as prescribed.  Follow up with your medical doctor for routine blood pressure monitoring at your next visit.  Notify your doctor if you have any of the following symptoms:   Dizziness, Lightheadedness, Blurry vision, Headache, Chest pain, Shortness of breath       PRINCIPAL DISCHARGE DIAGNOSIS  Diagnosis: Alcohol intoxication  Assessment and Plan of Treatment: You are being discharged to an inpatient alcohol rehab      SECONDARY DISCHARGE DIAGNOSES  Diagnosis: Fall down stairs  Assessment and Plan of Treatment: You must follow up with your primary medical doctor upon discharge from rehab.    Diagnosis: Alcohol withdrawal  Assessment and Plan of Treatment: You must abstain from alcohol usage    Diagnosis: History of deep venous thrombosis  Assessment and Plan of Treatment: You have an extensive clot history   As per Hematology, you must follow up with a hematologist upon discharge - at this appointment, you will discuss the need to restart anticoagulation, as well as the need for any further genetic testing.    Diagnosis: Hypertension  Assessment and Plan of Treatment: Low salt diet  Activity as tolerated.  Take all medication as prescribed.  Follow up with your medical doctor for routine blood pressure monitoring at your next visit.  Notify your doctor if you have any of the following symptoms:   Dizziness, Lightheadedness, Blurry vision, Headache, Chest pain, Shortness of breath

## 2022-04-11 NOTE — CHART NOTE - NSCHARTNOTEFT_GEN_A_CORE
Request from Dr. Kevin to facilitate patient discharge.  As per Dr. Kevin (who discussed with hematology), patient is to be discharged on no anticoagulation, and patient should follow up with hematology, on discharge from ETOH rehab, to discuss if he needs to be placed on any anticoagulation, and if he needs any further testing.  Medication reconciliation reviewed, revised, and resolved with Dr. Kevin, who has medically cleared patient for discharge with follow up as advised.  Please refer to discharge note for detailed hospital course.

## 2022-04-11 NOTE — DISCHARGE NOTE PROVIDER - CARE PROVIDER_API CALL
Saud Queen  INTERNAL MEDICINE  [address] [place] [address]  Phone: [phone]  Fax: [phone]  Follow Up Time:    Saud Queen  INTERNAL MEDICINE  [address] [place] [address]  Phone: [phone]  Fax: [phone]  Follow Up Time:     Siddharth Ayala)  Hematology; Internal Medicine; Medical Oncology  [address] [place] [address]  Phone: [phone]  Fax: [phone]  Follow Up Time:

## 2022-04-11 NOTE — DISCHARGE NOTE PROVIDER - NSDCFUADDAPPT_GEN_ALL_CORE_FT
Scheduled to be discharged to [facility] on 4/9/22  Please follow up with all medical appointments You are being discharged to an inpatient alcohol rehab treatment facility.    You must follow up with your primary medical doctor upon discharge from rehab.     You are being discharged to an inpatient alcohol rehab treatment facility.    You must follow up with your primary medical doctor upon discharge from rehab.    You must follow up with your Hematologist, Dr. Ayala, [phone] within one week of discharge - please call to make an appointment.  At this appointment, you will need to discuss whether you should be taking anticoagulation, as well as the need for any further testing.          APPTS ARE READY TO BE MADE: [x ] YES    Best Family or Patient Contact (if needed):    Additional Information about above appointments (if needed):    1: Hematologist  2: PCP  3:     Other comments or requests:

## 2022-04-11 NOTE — DISCHARGE NOTE PROVIDER - DETAILS OF MALNUTRITION DIAGNOSIS/DIAGNOSES
This patient has been assessed with a concern for Malnutrition and was treated during this hospitalization for the following Nutrition diagnosis/diagnoses:     -  04/03/2022: Mild protein-calorie malnutrition

## 2022-04-11 NOTE — DISCHARGE NOTE PROVIDER - PROVIDER TOKENS
PROVIDER:[TOKEN:[[id number]]] PROVIDER:[TOKEN:[[id number]]],PROVIDER:[TOKEN:[[id number]]]

## 2022-04-11 NOTE — PROGRESS NOTE ADULT - ASSESSMENT
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     # ETOH abuse  # hepatic steatosis  etoh cessation discussed, pt states he is interested in quitting  pt states he had a DUI couple months ago and was mandated by court to go to 30 day rehab program - [facility]  sp CIWA, Aivan taper  Abd US hepatic steatosis    #Anxiety- prn xanax- home med    #HTN- newly dx essential HTN  cont amlodipine and hydralazine for now  outpt mgmt    # fever  afebrile  BC strep epi likely contaminant  repeat BC NTD, no abx, ID following    # hx extensive LLE DVT sp thrombectomy and bl PE august 2021  appreciate heme recs re AC  pt is a unreliable historian, he has not followed up with any doctor since previous admission  repeat doppler US neg for DVT  fu hyper coag linton- check gene studies- Factor V leidon and prothrombin gene mutation  given high fall risk and unreliable follow up, held xarelto  dw hemeon- agreeable to dc pt off AC and outpt fu for furthur risks/benefits assesment    dvt ppx lovenox    SW to fu as pt high risk for relapse, safe dc plan- had a DUI couple months ago and was mandated by court to go to 30 day rehab program -   wife currently hospitalized  awaiting rehab plecemnt    [facility] with questions [phone]

## 2022-04-11 NOTE — DISCHARGE NOTE PROVIDER - NSDCMRMEDTOKEN_GEN_ALL_CORE_FT
amLODIPine 10 mg oral tablet: 1 tab(s) orally once a day  folic acid 1 mg oral tablet: 1 tab(s) orally once a day  hydrALAZINE 25 mg oral tablet: 1 tab(s) orally every 8 hours  LORazepam 1 mg oral tablet: 1 tab(s) orally once a day, As needed, Agitation  Multiple Vitamins oral tablet: 1 tab(s) orally once a day  polyethylene glycol 3350 oral powder for reconstitution: 17 gram(s) orally once a day, As Needed - for constipation  senna oral tablet: 2 tab(s) orally once a day (at bedtime)  Xarelto 20 mg oral tablet: 1 tab(s) orally once a day. Please start taking your Xarelto once a day starting September 17th.    amLODIPine 10 mg oral tablet: 1 tab(s) orally once a day  folic acid 1 mg oral tablet: 1 tab(s) orally once a day  hydrALAZINE 25 mg oral tablet: 1 tab(s) orally every 8 hours  LORazepam 1 mg oral tablet: 1 tab(s) orally once a day, As needed, Agitation  Multiple Vitamins oral tablet: 1 tab(s) orally once a day  Outpatient Physical Therapy: For strength and balance  polyethylene glycol 3350 oral powder for reconstitution: 17 gram(s) orally once a day, As Needed - for constipation  senna oral tablet: 2 tab(s) orally once a day (at bedtime)  Xarelto 20 mg oral tablet: 1 tab(s) orally once a day. Please start taking your Xarelto once a day starting September 17th.    ALPRAZolam 1 mg oral tablet: 0.5 tab(s) orally 2 times a day, As Needed - for anxiety  amLODIPine 10 mg oral tablet: 1 tab(s) orally once a day  folic acid 1 mg oral tablet: 1 tab(s) orally once a day  hydrALAZINE 25 mg oral tablet: 1 tab(s) orally every 8 hours  Multiple Vitamins oral tablet: 1 tab(s) orally once a day  Outpatient Physical Therapy: For strength and balance  polyethylene glycol 3350 oral powder for reconstitution: 17 gram(s) orally once a day, As Needed - for constipation  senna oral tablet: 2 tab(s) orally once a day (at bedtime)

## 2022-04-11 NOTE — DISCHARGE NOTE PROVIDER - HOSPITAL COURSE
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   Patient admits to liquor cocktails but refused to quantify amount for examiner.  Spouse also admitted to examiner that she also drinks ethanol with spouse, due to family recently with a cancer diagnosis.  Spouse denies domestic violence issues.   Patient with abrasion LEFT forearm.  Patient offers no complaint, but conversation desultory.  Patient awake, hypervigilant at times, but denies HA, focal weakness.  Denies arm pain, chest pain, abdominal pain, pelvic or leg pain.  Spouse reports that patient is concerned about his chronic DVT but it is not clear if patient is compliant with his direct anticoagulant.   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   Patient admits to liquor cocktails but refused to quantify amount for examiner.  Spouse also admitted to examiner that she also drinks ethanol with spouse, due to family recently with a cancer diagnosis.  Spouse denies domestic violence issues.   Patient with abrasion LEFT forearm.  Patient offers no complaint, but conversation desultory.  Patient awake, hypervigilant at times, but denies HA, focal weakness.  Denies arm pain, chest pain, abdominal pain, pelvic or leg pain.  Spouse reports that patient is concerned about his chronic DVT but it is not clear if patient is compliant with his direct anticoagulant.    #s/p fall  Xray pelvis - no fracture  CT head/c-spine - negative for acute findings  CT chest/abdomen/pelvis - no acute findings; hepatic steatosis    # ETOH abuse  # hepatic steatosis  etoh cessation discussed, pt states he is interested in quitting  pt states he had a DUI couple months ago and was mandated by court to go to 30 day rehab program -  [facility]  sp Ada BLACK taper  Abd US  shows hepatic steatosis    #Anxiety- prn xanax- home med    #HTN- newly dx essential HTN  cont amlodipine and hydralazine for now  outpt mgmt    # fever  afebrile  BC strep epi likely contaminant  repeat BC NTD, no abx, ID following    # hx extensive LLE DVT sp thrombectomy and bl PE august 2021  appreciate heme recs re AC  pt is a unreliable historian, he has not followed up with any doctor since previous admission  repeat doppler US neg for DVT  given high fall risk and unreliable follow up, held xarelto  fu hyper coag linton- check gene studies- Factor V leiden and prothrombin gene mutation    Patient medically cleared for discharge, by Dr. Kevin, to inpatient ETOH rehab, with PCP followup on discharge.     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   Patient admits to liquor cocktails but refused to quantify amount for examiner.  Spouse also admitted to examiner that she also drinks ethanol with spouse, due to family recently with a cancer diagnosis.  Spouse denies domestic violence issues.   Patient with abrasion LEFT forearm.  Patient offers no complaint, but conversation desultory.  Patient awake, hypervigilant at times, but denies HA, focal weakness.  Denies arm pain, chest pain, abdominal pain, pelvic or leg pain.  Spouse reports that patient is concerned about his chronic DVT but it is not clear if patient is compliant with his direct anticoagulant.    #s/p fall  Xray pelvis - no fracture  CT head/c-spine - negative for acute findings  CT chest/abdomen/pelvis - no acute findings; hepatic steatosis    # ETOH abuse  # hepatic steatosis  etoh cessation discussed, pt states he is interested in quitting  pt states he had a DUI couple months ago and was mandated by court to go to 30 day rehab program -  [facility] taper  Abd US  shows hepatic steatosis    #Anxiety- prn xanax- home med    #HTN- newly dx essential HTN  cont amlodipine and hydralazine for now  outpt mgmt    # fever  afebrile  BC strep epi likely contaminant  repeat BC NTD, no abx, ID following    # hx extensive LLE DVT sp thrombectomy and bl PE august 2021  appreciate heme recs re AC  pt is a unreliable historian, he has not followed up with any doctor since previous admission  repeat doppler US neg for DVT  given high fall risk and unreliable follow up, held xarelto  Attending discussed with Hematology - patient can be discharged on no anticoagulation, with hematology followup outpatient    Patient medically cleared for discharge, by Dr. Kevin, to inpatient ETOH rehab, with Hematology and PCP followup on discharge.

## 2022-04-11 NOTE — PROGRESS NOTE ADULT - SUBJECTIVE AND OBJECTIVE BOX
Patient is a 72y old  Male who presents with a chief complaint of Found at the bottom of the stairs at home by spouse with patient intoxicated, with spouse also intoxicated per EMS (11 Apr 2022 10:50)      INTERVAL HPI/OVERNIGHT EVENTS: noted  pt seen and examined this am   events noted  feels well      Vital Signs Last 24 Hrs  T(C): 36.9 (11 Apr 2022 04:32), Max: 36.9 (11 Apr 2022 04:32)  T(F): 98.4 (11 Apr 2022 04:32), Max: 98.4 (11 Apr 2022 04:32)  HR: 78 (11 Apr 2022 04:32) (78 - 85)  BP: 130/62 (11 Apr 2022 04:32) (116/64 - 151/80)  BP(mean): --  RR: 18 (11 Apr 2022 04:32) (18 - 18)  SpO2: 97% (11 Apr 2022 04:32) (95% - 97%)    acetaminophen     Tablet .. 650 milliGRAM(s) Oral every 6 hours PRN  ALPRAZolam 0.5 milliGRAM(s) Oral two times a day PRN  amLODIPine   Tablet 10 milliGRAM(s) Oral daily  dextrose 5%. 1000 milliLiter(s) IV Continuous <Continuous>  dextrose 5%. 1000 milliLiter(s) IV Continuous <Continuous>  dextrose 50% Injectable 25 Gram(s) IV Push once  dextrose 50% Injectable 12.5 Gram(s) IV Push once  dextrose 50% Injectable 25 Gram(s) IV Push once  dextrose Oral Gel 15 Gram(s) Oral once PRN  enoxaparin Injectable 40 milliGRAM(s) SubCutaneous every 24 hours  folic acid 1 milliGRAM(s) Oral daily  glucagon  Injectable 1 milliGRAM(s) IntraMuscular once  guaiFENesin Oral Liquid (Sugar-Free) 100 milliGRAM(s) Oral every 6 hours PRN  hydrALAZINE 25 milliGRAM(s) Oral every 8 hours  multivitamin 1 Tablet(s) Oral daily  polyethylene glycol 3350 17 Gram(s) Oral daily PRN  senna 2 Tablet(s) Oral at bedtime  sodium chloride 0.9%. 1000 milliLiter(s) IV Continuous <Continuous>      PHYSICAL EXAM:  GENERAL: NAD,   EYES: conjunctiva and sclera clear  ENMT: Moist mucous membranes  NECK: Supple, No JVD, Normal thyroid  CHEST/LUNG: non labored, cta b/l  HEART: Regular rate and rhythm; No murmurs, rubs, or gallops  ABDOMEN: Soft, Nontender, Nondistended; Bowel sounds present  EXTREMITIES:  2+ Peripheral Pulses, No clubbing, cyanosis, or edema  LYMPH: No lymphadenopathy noted  SKIN: No rashes or lesions    Consultant(s) Notes Reviewed:  [x ] YES  [ ] NO  Care Discussed with Consultants/Other Providers [ x] YES  [ ] NO    LABS:              CAPILLARY BLOOD GLUCOSE                  RADIOLOGY & ADDITIONAL TESTS:    Imaging Personally Reviewed:  [x ] YES  [ ] NO

## 2022-04-11 NOTE — DISCHARGE NOTE PROVIDER - CARE PROVIDERS DIRECT ADDRESSES
,DirectAddress_Unknown ,DirectAddress_Unknown,[email or web address]

## 2022-04-18 LAB
DNA PLOIDY SPEC FC-IMP: SIGNIFICANT CHANGE UP
PTR INTERPRETATION: SIGNIFICANT CHANGE UP

## 2022-07-26 NOTE — SBIRT NOTE ADULT - NSSBIRTALCACTION/INTER_GEN_A_CORE
Brief intervention General Sunscreen Counseling: I recommended a broad spectrum sunscreen with a SPF of 30 or higher.  I explained that SPF 30 sunscreens block approximately 97 percent of the sun's harmful rays.  Sunscreens should be applied at least 15 minutes prior to expected sun exposure and then every 2 hours after that as long as sun exposure continues. If swimming or exercising sunscreen should be reapplied every 45 minutes to an hour after getting wet or sweating.  One ounce, or the equivalent of a shot glass full of sunscreen, is adequate to protect the skin not covered by a bathing suit. I also recommended a lip balm with a sunscreen as well. Sun protective clothing can be used in lieu of sunscreen but must be worn the entire time you are exposed to the sun's rays.  \\n Detail Level: Detailed Products Recommended: SPF 30, reapplication every 2 hours or after swimming and sweating.\\nVit C Sunscreen\\nCeraVe AM\\nTinted SPF\\nBanana Boat Body

## 2024-03-07 NOTE — PROCEDURE NOTE - PROCEDURE FINDINGS AND DETAILS
Status post iliocaval venous thrombosis mechanical thrombectomy with Inari Clottriever device, post thrombectomy venogram shows patent IVC and iliofemoral venous system. No acute complications Statement Selected

## 2024-05-20 NOTE — DIETITIAN INITIAL EVALUATION ADULT. - PROBLEM/PLAN-4
Order created and printed- which Dr Kate are we talking about  When I search there are some that are affiliated but not attached, some not affiliated and one that is employed (all family doctors).  There are no notes from Dr Kate in her chart.   Ok to fax.  She will need to be sure we get results once she has them done  Thanks, andre    DISPLAY PLAN FREE TEXT
